# Patient Record
Sex: FEMALE | Race: WHITE | NOT HISPANIC OR LATINO | Employment: OTHER | URBAN - METROPOLITAN AREA
[De-identification: names, ages, dates, MRNs, and addresses within clinical notes are randomized per-mention and may not be internally consistent; named-entity substitution may affect disease eponyms.]

---

## 2021-01-01 ENCOUNTER — HOSPITAL ENCOUNTER (INPATIENT)
Facility: HOSPITAL | Age: 86
LOS: 1 days | Discharge: HOSPICE | End: 2021-12-06
Attending: STUDENT IN AN ORGANIZED HEALTH CARE EDUCATION/TRAINING PROGRAM | Admitting: INTERNAL MEDICINE

## 2021-01-01 ENCOUNTER — APPOINTMENT (OUTPATIENT)
Dept: CT IMAGING | Facility: HOSPITAL | Age: 86
End: 2021-01-01

## 2021-01-01 ENCOUNTER — HOSPITAL ENCOUNTER (INPATIENT)
Facility: HOSPITAL | Age: 86
LOS: 1 days | End: 2021-12-07
Attending: INTERNAL MEDICINE | Admitting: INTERNAL MEDICINE

## 2021-01-01 ENCOUNTER — APPOINTMENT (OUTPATIENT)
Dept: GENERAL RADIOLOGY | Facility: HOSPITAL | Age: 86
End: 2021-01-01

## 2021-01-01 ENCOUNTER — APPOINTMENT (OUTPATIENT)
Dept: CARDIOLOGY | Facility: HOSPITAL | Age: 86
End: 2021-01-01

## 2021-01-01 ENCOUNTER — HOSPITAL ENCOUNTER (INPATIENT)
Facility: HOSPITAL | Age: 86
LOS: 7 days | Discharge: NURSING, REHAB OR LONG-TERM CARE | End: 2021-10-22
Attending: EMERGENCY MEDICINE | Admitting: FAMILY MEDICINE

## 2021-01-01 VITALS
OXYGEN SATURATION: 92 % | DIASTOLIC BLOOD PRESSURE: 61 MMHG | RESPIRATION RATE: 30 BRPM | HEART RATE: 100 BPM | TEMPERATURE: 100.1 F | HEIGHT: 63 IN | WEIGHT: 91 LBS | SYSTOLIC BLOOD PRESSURE: 90 MMHG | BODY MASS INDEX: 16.12 KG/M2

## 2021-01-01 VITALS
RESPIRATION RATE: 27 BRPM | SYSTOLIC BLOOD PRESSURE: 91 MMHG | HEIGHT: 63 IN | BODY MASS INDEX: 16.16 KG/M2 | TEMPERATURE: 98.4 F | HEART RATE: 101 BPM | WEIGHT: 91.2 LBS | DIASTOLIC BLOOD PRESSURE: 55 MMHG | OXYGEN SATURATION: 90 %

## 2021-01-01 VITALS
SYSTOLIC BLOOD PRESSURE: 108 MMHG | TEMPERATURE: 99.6 F | DIASTOLIC BLOOD PRESSURE: 78 MMHG | OXYGEN SATURATION: 97 % | RESPIRATION RATE: 14 BRPM | HEART RATE: 107 BPM | WEIGHT: 99.9 LBS

## 2021-01-01 DIAGNOSIS — F03.91 DEMENTIA WITH BEHAVIORAL DISTURBANCE, UNSPECIFIED DEMENTIA TYPE: ICD-10-CM

## 2021-01-01 DIAGNOSIS — I48.91 ATRIAL FIBRILLATION, UNSPECIFIED TYPE (HCC): ICD-10-CM

## 2021-01-01 DIAGNOSIS — J96.01 ACUTE RESPIRATORY FAILURE WITH HYPOXIA (HCC): Primary | ICD-10-CM

## 2021-01-01 DIAGNOSIS — E43 SEVERE MALNUTRITION (HCC): ICD-10-CM

## 2021-01-01 DIAGNOSIS — N30.00 ACUTE CYSTITIS WITHOUT HEMATURIA: Primary | ICD-10-CM

## 2021-01-01 DIAGNOSIS — J18.9 PNEUMONIA OF BOTH LUNGS DUE TO INFECTIOUS ORGANISM, UNSPECIFIED PART OF LUNG: ICD-10-CM

## 2021-01-01 DIAGNOSIS — N32.89 BLADDER MASS: ICD-10-CM

## 2021-01-01 DIAGNOSIS — F03.90 DEMENTIA WITHOUT BEHAVIORAL DISTURBANCE, UNSPECIFIED DEMENTIA TYPE: ICD-10-CM

## 2021-01-01 DIAGNOSIS — R41.0 CONFUSION: ICD-10-CM

## 2021-01-01 LAB
ADV 40+41 DNA STL QL NAA+NON-PROBE: NOT DETECTED
ALBUMIN SERPL-MCNC: 3.7 G/DL (ref 3.5–5.2)
ALBUMIN SERPL-MCNC: 3.7 G/DL (ref 3.5–5.2)
ALBUMIN/GLOB SERPL: 1.1 G/DL
ALBUMIN/GLOB SERPL: 1.3 G/DL
ALP SERPL-CCNC: 205 U/L (ref 39–117)
ALP SERPL-CCNC: 258 U/L (ref 39–117)
ALT SERPL W P-5'-P-CCNC: 21 U/L (ref 1–33)
ALT SERPL W P-5'-P-CCNC: 34 U/L (ref 1–33)
ANION GAP SERPL CALCULATED.3IONS-SCNC: 10 MMOL/L (ref 5–15)
ANION GAP SERPL CALCULATED.3IONS-SCNC: 11 MMOL/L (ref 5–15)
ANION GAP SERPL CALCULATED.3IONS-SCNC: 11 MMOL/L (ref 5–15)
ANION GAP SERPL CALCULATED.3IONS-SCNC: 12 MMOL/L (ref 5–15)
ANION GAP SERPL CALCULATED.3IONS-SCNC: 9 MMOL/L (ref 5–15)
ARTERIAL PATENCY WRIST A: ABNORMAL
AST SERPL-CCNC: 30 U/L (ref 1–32)
AST SERPL-CCNC: 32 U/L (ref 1–32)
ASTRO TYP 1-8 RNA STL QL NAA+NON-PROBE: NOT DETECTED
ATMOSPHERIC PRESS: ABNORMAL MM[HG]
BACTERIA SPEC AEROBE CULT: ABNORMAL
BACTERIA UR QL AUTO: ABNORMAL /HPF
BACTERIA UR QL AUTO: ABNORMAL /HPF
BASE EXCESS BLDA CALC-SCNC: -2.7 MMOL/L (ref 0–2)
BASOPHILS # BLD AUTO: 0.06 10*3/MM3 (ref 0–0.2)
BASOPHILS NFR BLD AUTO: 0.5 % (ref 0–1.5)
BASOPHILS NFR BLD AUTO: 0.8 % (ref 0–1.5)
BASOPHILS NFR BLD AUTO: 0.9 % (ref 0–1.5)
BDY SITE: ABNORMAL
BILIRUB SERPL-MCNC: 0.5 MG/DL (ref 0–1.2)
BILIRUB SERPL-MCNC: 0.5 MG/DL (ref 0–1.2)
BILIRUB UR QL STRIP: NEGATIVE
BILIRUB UR QL STRIP: NEGATIVE
BODY TEMPERATURE: 37 C
BUN SERPL-MCNC: 11 MG/DL (ref 8–23)
BUN SERPL-MCNC: 11 MG/DL (ref 8–23)
BUN SERPL-MCNC: 12 MG/DL (ref 8–23)
BUN SERPL-MCNC: 14 MG/DL (ref 8–23)
BUN SERPL-MCNC: 20 MG/DL (ref 8–23)
BUN SERPL-MCNC: 20 MG/DL (ref 8–23)
BUN SERPL-MCNC: 22 MG/DL (ref 8–23)
BUN/CREAT SERPL: 15.8 (ref 7–25)
BUN/CREAT SERPL: 16.7 (ref 7–25)
BUN/CREAT SERPL: 16.7 (ref 7–25)
BUN/CREAT SERPL: 23 (ref 7–25)
BUN/CREAT SERPL: 23.7 (ref 7–25)
BUN/CREAT SERPL: 25.3 (ref 7–25)
BUN/CREAT SERPL: 29 (ref 7–25)
C CAYETANENSIS DNA STL QL NAA+NON-PROBE: NOT DETECTED
C COLI+JEJ+UPSA DNA STL QL NAA+NON-PROBE: NOT DETECTED
C DIFF TOX GENS STL QL NAA+PROBE: NOT DETECTED
CALCIUM SPEC-SCNC: 10.2 MG/DL (ref 8.2–9.6)
CALCIUM SPEC-SCNC: 8.4 MG/DL (ref 8.2–9.6)
CALCIUM SPEC-SCNC: 8.5 MG/DL (ref 8.2–9.6)
CALCIUM SPEC-SCNC: 8.7 MG/DL (ref 8.2–9.6)
CALCIUM SPEC-SCNC: 9.3 MG/DL (ref 8.2–9.6)
CALCIUM SPEC-SCNC: 9.4 MG/DL (ref 8.2–9.6)
CALCIUM SPEC-SCNC: 9.4 MG/DL (ref 8.2–9.6)
CHLORIDE SERPL-SCNC: 102 MMOL/L (ref 98–107)
CHLORIDE SERPL-SCNC: 103 MMOL/L (ref 98–107)
CHLORIDE SERPL-SCNC: 104 MMOL/L (ref 98–107)
CHLORIDE SERPL-SCNC: 105 MMOL/L (ref 98–107)
CHLORIDE SERPL-SCNC: 105 MMOL/L (ref 98–107)
CHLORIDE SERPL-SCNC: 106 MMOL/L (ref 98–107)
CHLORIDE SERPL-SCNC: 107 MMOL/L (ref 98–107)
CLARITY UR: CLEAR
CLARITY UR: CLEAR
CO2 BLDA-SCNC: 25.3 MMOL/L (ref 22–33)
CO2 SERPL-SCNC: 21 MMOL/L (ref 22–29)
CO2 SERPL-SCNC: 23 MMOL/L (ref 22–29)
CO2 SERPL-SCNC: 24 MMOL/L (ref 22–29)
CO2 SERPL-SCNC: 24 MMOL/L (ref 22–29)
CO2 SERPL-SCNC: 26 MMOL/L (ref 22–29)
CO2 SERPL-SCNC: 28 MMOL/L (ref 22–29)
CO2 SERPL-SCNC: 29 MMOL/L (ref 22–29)
COHGB MFR BLD: 0.5 % (ref 0–2)
COLOR UR: YELLOW
COLOR UR: YELLOW
CREAT SERPL-MCNC: 0.61 MG/DL (ref 0.57–1)
CREAT SERPL-MCNC: 0.66 MG/DL (ref 0.57–1)
CREAT SERPL-MCNC: 0.66 MG/DL (ref 0.57–1)
CREAT SERPL-MCNC: 0.69 MG/DL (ref 0.57–1)
CREAT SERPL-MCNC: 0.76 MG/DL (ref 0.57–1)
CREAT SERPL-MCNC: 0.79 MG/DL (ref 0.57–1)
CREAT SERPL-MCNC: 0.93 MG/DL (ref 0.57–1)
CRYPTOSP DNA STL QL NAA+NON-PROBE: NOT DETECTED
D DIMER PPP FEU-MCNC: >20 MCGFEU/ML (ref 0–0.56)
D-LACTATE SERPL-SCNC: 2.2 MMOL/L (ref 0.5–2)
DEPRECATED RDW RBC AUTO: 45.4 FL (ref 37–54)
DEPRECATED RDW RBC AUTO: 45.6 FL (ref 37–54)
DEPRECATED RDW RBC AUTO: 51.8 FL (ref 37–54)
E HISTOLYT DNA STL QL NAA+NON-PROBE: NOT DETECTED
EAEC PAA PLAS AGGR+AATA ST NAA+NON-PRB: NOT DETECTED
EC STX1+STX2 GENES STL QL NAA+NON-PROBE: NOT DETECTED
EOSINOPHIL # BLD AUTO: 0.13 10*3/MM3 (ref 0–0.4)
EOSINOPHIL # BLD AUTO: 0.34 10*3/MM3 (ref 0–0.4)
EOSINOPHIL # BLD AUTO: 0.39 10*3/MM3 (ref 0–0.4)
EOSINOPHIL NFR BLD AUTO: 1 % (ref 0.3–6.2)
EOSINOPHIL NFR BLD AUTO: 5.2 % (ref 0.3–6.2)
EOSINOPHIL NFR BLD AUTO: 5.3 % (ref 0.3–6.2)
EPAP: 0
EPEC EAE GENE STL QL NAA+NON-PROBE: NOT DETECTED
ERYTHROCYTE [DISTWIDTH] IN BLOOD BY AUTOMATED COUNT: 14 % (ref 12.3–15.4)
ERYTHROCYTE [DISTWIDTH] IN BLOOD BY AUTOMATED COUNT: 14 % (ref 12.3–15.4)
ERYTHROCYTE [DISTWIDTH] IN BLOOD BY AUTOMATED COUNT: 14.7 % (ref 12.3–15.4)
ETEC LTA+ST1A+ST1B TOX ST NAA+NON-PROBE: NOT DETECTED
FLUAV RNA RESP QL NAA+PROBE: NOT DETECTED
FLUBV RNA RESP QL NAA+PROBE: NOT DETECTED
G LAMBLIA DNA STL QL NAA+NON-PROBE: NOT DETECTED
GFR SERPL CREATININE-BSD FRML MDRD: 57 ML/MIN/1.73
GFR SERPL CREATININE-BSD FRML MDRD: 68 ML/MIN/1.73
GFR SERPL CREATININE-BSD FRML MDRD: 68 ML/MIN/1.73
GFR SERPL CREATININE-BSD FRML MDRD: 71 ML/MIN/1.73
GFR SERPL CREATININE-BSD FRML MDRD: 80 ML/MIN/1.73
GFR SERPL CREATININE-BSD FRML MDRD: 84 ML/MIN/1.73
GFR SERPL CREATININE-BSD FRML MDRD: 84 ML/MIN/1.73
GFR SERPL CREATININE-BSD FRML MDRD: 92 ML/MIN/1.73
GLOBULIN UR ELPH-MCNC: 2.9 GM/DL
GLOBULIN UR ELPH-MCNC: 3.5 GM/DL
GLUCOSE SERPL-MCNC: 117 MG/DL (ref 65–99)
GLUCOSE SERPL-MCNC: 118 MG/DL (ref 65–99)
GLUCOSE SERPL-MCNC: 135 MG/DL (ref 65–99)
GLUCOSE SERPL-MCNC: 84 MG/DL (ref 65–99)
GLUCOSE SERPL-MCNC: 86 MG/DL (ref 65–99)
GLUCOSE SERPL-MCNC: 90 MG/DL (ref 65–99)
GLUCOSE SERPL-MCNC: 90 MG/DL (ref 65–99)
GLUCOSE UR STRIP-MCNC: NEGATIVE MG/DL
GLUCOSE UR STRIP-MCNC: NEGATIVE MG/DL
HCO3 BLDA-SCNC: 23.8 MMOL/L (ref 20–26)
HCT VFR BLD AUTO: 44.2 % (ref 34–46.6)
HCT VFR BLD AUTO: 45.3 % (ref 34–46.6)
HCT VFR BLD AUTO: 47.2 % (ref 34–46.6)
HCT VFR BLD CALC: 44.7 % (ref 38–51)
HGB BLD-MCNC: 15 G/DL (ref 12–15.9)
HGB BLD-MCNC: 15.2 G/DL (ref 12–15.9)
HGB BLD-MCNC: 15.6 G/DL (ref 12–15.9)
HGB BLDA-MCNC: 14.6 G/DL (ref 14–18)
HGB UR QL STRIP.AUTO: ABNORMAL
HGB UR QL STRIP.AUTO: NEGATIVE
HOLD SPECIMEN: NORMAL
HYALINE CASTS UR QL AUTO: ABNORMAL /LPF
IMM GRANULOCYTES # BLD AUTO: 0.02 10*3/MM3 (ref 0–0.05)
IMM GRANULOCYTES # BLD AUTO: 0.02 10*3/MM3 (ref 0–0.05)
IMM GRANULOCYTES # BLD AUTO: 0.26 10*3/MM3 (ref 0–0.05)
IMM GRANULOCYTES NFR BLD AUTO: 0.3 % (ref 0–0.5)
IMM GRANULOCYTES NFR BLD AUTO: 0.3 % (ref 0–0.5)
IMM GRANULOCYTES NFR BLD AUTO: 2 % (ref 0–0.5)
INHALED O2 CONCENTRATION: 100 %
IPAP: 0
KETONES UR QL STRIP: ABNORMAL
KETONES UR QL STRIP: NEGATIVE
LEUKOCYTE ESTERASE UR QL STRIP.AUTO: ABNORMAL
LEUKOCYTE ESTERASE UR QL STRIP.AUTO: ABNORMAL
LIPASE SERPL-CCNC: 69 U/L (ref 13–60)
LYMPHOCYTES # BLD AUTO: 1.55 10*3/MM3 (ref 0.7–3.1)
LYMPHOCYTES # BLD AUTO: 1.83 10*3/MM3 (ref 0.7–3.1)
LYMPHOCYTES # BLD AUTO: 2.29 10*3/MM3 (ref 0.7–3.1)
LYMPHOCYTES NFR BLD AUTO: 17.3 % (ref 19.6–45.3)
LYMPHOCYTES NFR BLD AUTO: 23.6 % (ref 19.6–45.3)
LYMPHOCYTES NFR BLD AUTO: 24.8 % (ref 19.6–45.3)
MAGNESIUM SERPL-MCNC: 1.8 MG/DL (ref 1.7–2.3)
MAGNESIUM SERPL-MCNC: 1.9 MG/DL (ref 1.7–2.3)
MAGNESIUM SERPL-MCNC: 2.1 MG/DL (ref 1.7–2.3)
MAGNESIUM SERPL-MCNC: 2.2 MG/DL (ref 1.7–2.3)
MAGNESIUM SERPL-MCNC: 2.3 MG/DL (ref 1.7–2.3)
MAGNESIUM SERPL-MCNC: 2.5 MG/DL (ref 1.7–2.3)
MCH RBC QN AUTO: 30.4 PG (ref 26.6–33)
MCH RBC QN AUTO: 30.6 PG (ref 26.6–33)
MCH RBC QN AUTO: 30.8 PG (ref 26.6–33)
MCHC RBC AUTO-ENTMCNC: 31.8 G/DL (ref 31.5–35.7)
MCHC RBC AUTO-ENTMCNC: 34.4 G/DL (ref 31.5–35.7)
MCHC RBC AUTO-ENTMCNC: 34.4 G/DL (ref 31.5–35.7)
MCV RBC AUTO: 88.9 FL (ref 79–97)
MCV RBC AUTO: 89.5 FL (ref 79–97)
MCV RBC AUTO: 95.5 FL (ref 79–97)
METHGB BLD QL: 0.6 % (ref 0–1.5)
MODALITY: ABNORMAL
MONOCYTES # BLD AUTO: 0.72 10*3/MM3 (ref 0.1–0.9)
MONOCYTES # BLD AUTO: 0.83 10*3/MM3 (ref 0.1–0.9)
MONOCYTES # BLD AUTO: 0.99 10*3/MM3 (ref 0.1–0.9)
MONOCYTES NFR BLD AUTO: 11 % (ref 5–12)
MONOCYTES NFR BLD AUTO: 11.2 % (ref 5–12)
MONOCYTES NFR BLD AUTO: 7.5 % (ref 5–12)
NEUTROPHILS NFR BLD AUTO: 3.88 10*3/MM3 (ref 1.7–7)
NEUTROPHILS NFR BLD AUTO: 4.26 10*3/MM3 (ref 1.7–7)
NEUTROPHILS NFR BLD AUTO: 57.6 % (ref 42.7–76)
NEUTROPHILS NFR BLD AUTO: 59 % (ref 42.7–76)
NEUTROPHILS NFR BLD AUTO: 71.7 % (ref 42.7–76)
NEUTROPHILS NFR BLD AUTO: 9.48 10*3/MM3 (ref 1.7–7)
NITRITE UR QL STRIP: NEGATIVE
NITRITE UR QL STRIP: POSITIVE
NOROVIRUS GI+II RNA STL QL NAA+NON-PROBE: NOT DETECTED
NOTE: ABNORMAL
NRBC BLD AUTO-RTO: 0 /100 WBC (ref 0–0.2)
NT-PROBNP SERPL-MCNC: 853.2 PG/ML (ref 0–1800)
NT-PROBNP SERPL-MCNC: 989.9 PG/ML (ref 0–1800)
OXYHGB MFR BLDV: 98.7 % (ref 94–99)
P SHIGELLOIDES DNA STL QL NAA+NON-PROBE: NOT DETECTED
PAW @ PEAK INSP FLOW SETTING VENT: 0 CMH2O
PCO2 BLDA: 46.9 MM HG (ref 35–45)
PCO2 TEMP ADJ BLD: 46.9 MM HG (ref 35–45)
PH BLDA: 7.31 PH UNITS (ref 7.35–7.45)
PH UR STRIP.AUTO: 5.5 [PH] (ref 5–8)
PH UR STRIP.AUTO: 6 [PH] (ref 5–8)
PH, TEMP CORRECTED: 7.31 PH UNITS
PHOSPHATE SERPL-MCNC: 3.4 MG/DL (ref 2.5–4.5)
PLATELET # BLD AUTO: 276 10*3/MM3 (ref 140–450)
PLATELET # BLD AUTO: 296 10*3/MM3 (ref 140–450)
PLATELET # BLD AUTO: 305 10*3/MM3 (ref 140–450)
PMV BLD AUTO: 10 FL (ref 6–12)
PMV BLD AUTO: 10.1 FL (ref 6–12)
PMV BLD AUTO: 10.8 FL (ref 6–12)
PO2 BLDA: 298 MM HG (ref 83–108)
PO2 TEMP ADJ BLD: 298 MM HG (ref 83–108)
POTASSIUM SERPL-SCNC: 3.2 MMOL/L (ref 3.5–5.2)
POTASSIUM SERPL-SCNC: 3.4 MMOL/L (ref 3.5–5.2)
POTASSIUM SERPL-SCNC: 3.6 MMOL/L (ref 3.5–5.2)
POTASSIUM SERPL-SCNC: 3.8 MMOL/L (ref 3.5–5.2)
POTASSIUM SERPL-SCNC: 3.8 MMOL/L (ref 3.5–5.2)
POTASSIUM SERPL-SCNC: 3.9 MMOL/L (ref 3.5–5.2)
POTASSIUM SERPL-SCNC: 4.2 MMOL/L (ref 3.5–5.2)
POTASSIUM SERPL-SCNC: 4.4 MMOL/L (ref 3.5–5.2)
PROCALCITONIN SERPL-MCNC: 0.07 NG/ML (ref 0–0.25)
PROT SERPL-MCNC: 6.6 G/DL (ref 6–8.5)
PROT SERPL-MCNC: 7.2 G/DL (ref 6–8.5)
PROT UR QL STRIP: ABNORMAL
PROT UR QL STRIP: ABNORMAL
QT INTERVAL: 276 MS
QT INTERVAL: 360 MS
QT INTERVAL: 376 MS
QT INTERVAL: 392 MS
QT INTERVAL: 394 MS
QTC INTERVAL: 457 MS
QTC INTERVAL: 483 MS
QTC INTERVAL: 511 MS
QTC INTERVAL: 516 MS
QTC INTERVAL: 533 MS
RBC # BLD AUTO: 4.94 10*6/MM3 (ref 3.77–5.28)
RBC # BLD AUTO: 4.97 10*6/MM3 (ref 3.77–5.28)
RBC # BLD AUTO: 5.06 10*6/MM3 (ref 3.77–5.28)
RBC # UR STRIP: ABNORMAL /HPF
RBC # UR: ABNORMAL /HPF
REF LAB TEST METHOD: ABNORMAL
REF LAB TEST METHOD: ABNORMAL
RVA RNA STL QL NAA+NON-PROBE: NOT DETECTED
S ENT+BONG DNA STL QL NAA+NON-PROBE: NOT DETECTED
SAPO I+II+IV+V RNA STL QL NAA+NON-PROBE: NOT DETECTED
SARS-COV-2 RDRP RESP QL NAA+PROBE: NORMAL
SARS-COV-2 RDRP RESP QL NAA+PROBE: NORMAL
SARS-COV-2 RNA PNL SPEC NAA+PROBE: NOT DETECTED
SARS-COV-2 RNA RESP QL NAA+PROBE: NOT DETECTED
SHIGELLA SP+EIEC IPAH ST NAA+NON-PROBE: NOT DETECTED
SODIUM SERPL-SCNC: 138 MMOL/L (ref 136–145)
SODIUM SERPL-SCNC: 139 MMOL/L (ref 136–145)
SODIUM SERPL-SCNC: 139 MMOL/L (ref 136–145)
SODIUM SERPL-SCNC: 140 MMOL/L (ref 136–145)
SODIUM SERPL-SCNC: 140 MMOL/L (ref 136–145)
SODIUM SERPL-SCNC: 141 MMOL/L (ref 136–145)
SODIUM SERPL-SCNC: 143 MMOL/L (ref 136–145)
SP GR UR STRIP: 1.02 (ref 1–1.03)
SP GR UR STRIP: 1.02 (ref 1–1.03)
SQUAMOUS #/AREA URNS HPF: ABNORMAL /HPF
SQUAMOUS #/AREA URNS HPF: ABNORMAL /HPF
T4 FREE SERPL-MCNC: 1.32 NG/DL (ref 0.93–1.7)
TOTAL RATE: 0 BREATHS/MINUTE
TROPONIN T SERPL-MCNC: <0.01 NG/ML (ref 0–0.03)
TSH SERPL DL<=0.05 MIU/L-ACNC: 14.36 UIU/ML (ref 0.27–4.2)
TSH SERPL DL<=0.05 MIU/L-ACNC: 7.37 UIU/ML (ref 0.27–4.2)
UROBILINOGEN UR QL STRIP: ABNORMAL
UROBILINOGEN UR QL STRIP: ABNORMAL
V CHOL+PARA+VUL DNA STL QL NAA+NON-PROBE: NOT DETECTED
V CHOLERAE DNA STL QL NAA+NON-PROBE: NOT DETECTED
WBC # BLD AUTO: 6.57 10*3/MM3 (ref 3.4–10.8)
WBC # BLD AUTO: 7.39 10*3/MM3 (ref 3.4–10.8)
WBC # UR STRIP: ABNORMAL /HPF
WBC NRBC COR # BLD: 13.21 10*3/MM3 (ref 3.4–10.8)
WBC UR QL AUTO: ABNORMAL /HPF
WHOLE BLOOD HOLD SPECIMEN: NORMAL
Y ENTEROCOL DNA STL QL NAA+NON-PROBE: NOT DETECTED

## 2021-01-01 PROCEDURE — 81001 URINALYSIS AUTO W/SCOPE: CPT | Performed by: EMERGENCY MEDICINE

## 2021-01-01 PROCEDURE — 85025 COMPLETE CBC W/AUTO DIFF WBC: CPT | Performed by: NURSE PRACTITIONER

## 2021-01-01 PROCEDURE — 80048 BASIC METABOLIC PNL TOTAL CA: CPT | Performed by: NURSE PRACTITIONER

## 2021-01-01 PROCEDURE — 83050 HGB METHEMOGLOBIN QUAN: CPT

## 2021-01-01 PROCEDURE — 99232 SBSQ HOSP IP/OBS MODERATE 35: CPT | Performed by: INTERNAL MEDICINE

## 2021-01-01 PROCEDURE — 94799 UNLISTED PULMONARY SVC/PX: CPT

## 2021-01-01 PROCEDURE — 99239 HOSP IP/OBS DSCHRG MGMT >30: CPT | Performed by: FAMILY MEDICINE

## 2021-01-01 PROCEDURE — 99223 1ST HOSP IP/OBS HIGH 75: CPT | Performed by: FAMILY MEDICINE

## 2021-01-01 PROCEDURE — 25010000002 HYDROMORPHONE PER 4 MG: Performed by: INTERNAL MEDICINE

## 2021-01-01 PROCEDURE — P9612 CATHETERIZE FOR URINE SPEC: HCPCS

## 2021-01-01 PROCEDURE — 85025 COMPLETE CBC W/AUTO DIFF WBC: CPT | Performed by: STUDENT IN AN ORGANIZED HEALTH CARE EDUCATION/TRAINING PROGRAM

## 2021-01-01 PROCEDURE — 25010000002 HEPARIN (PORCINE) PER 1000 UNITS: Performed by: FAMILY MEDICINE

## 2021-01-01 PROCEDURE — 70450 CT HEAD/BRAIN W/O DYE: CPT

## 2021-01-01 PROCEDURE — 85379 FIBRIN DEGRADATION QUANT: CPT | Performed by: STUDENT IN AN ORGANIZED HEALTH CARE EDUCATION/TRAINING PROGRAM

## 2021-01-01 PROCEDURE — 80048 BASIC METABOLIC PNL TOTAL CA: CPT | Performed by: HOSPITALIST

## 2021-01-01 PROCEDURE — 87636 SARSCOV2 & INF A&B AMP PRB: CPT | Performed by: NURSE PRACTITIONER

## 2021-01-01 PROCEDURE — 25010000002 CEFTRIAXONE PER 250 MG: Performed by: NURSE PRACTITIONER

## 2021-01-01 PROCEDURE — 94640 AIRWAY INHALATION TREATMENT: CPT

## 2021-01-01 PROCEDURE — 25010000002 HALOPERIDOL LACTATE PER 5 MG: Performed by: NURSE PRACTITIONER

## 2021-01-01 PROCEDURE — 84132 ASSAY OF SERUM POTASSIUM: CPT | Performed by: HOSPITALIST

## 2021-01-01 PROCEDURE — 25010000003 POTASSIUM CHLORIDE 10 MEQ/100ML SOLUTION: Performed by: NURSE PRACTITIONER

## 2021-01-01 PROCEDURE — 84100 ASSAY OF PHOSPHORUS: CPT | Performed by: STUDENT IN AN ORGANIZED HEALTH CARE EDUCATION/TRAINING PROGRAM

## 2021-01-01 PROCEDURE — 87040 BLOOD CULTURE FOR BACTERIA: CPT | Performed by: STUDENT IN AN ORGANIZED HEALTH CARE EDUCATION/TRAINING PROGRAM

## 2021-01-01 PROCEDURE — U0004 COV-19 TEST NON-CDC HGH THRU: HCPCS | Performed by: HOSPITALIST

## 2021-01-01 PROCEDURE — 87077 CULTURE AEROBIC IDENTIFY: CPT | Performed by: EMERGENCY MEDICINE

## 2021-01-01 PROCEDURE — 25010000002 HEPARIN (PORCINE) PER 1000 UNITS: Performed by: NURSE PRACTITIONER

## 2021-01-01 PROCEDURE — 25010000002 HYDROMORPHONE PER 4 MG: Performed by: NURSE PRACTITIONER

## 2021-01-01 PROCEDURE — 83735 ASSAY OF MAGNESIUM: CPT | Performed by: NURSE PRACTITIONER

## 2021-01-01 PROCEDURE — 71250 CT THORAX DX C-: CPT

## 2021-01-01 PROCEDURE — 93005 ELECTROCARDIOGRAM TRACING: CPT | Performed by: NURSE PRACTITIONER

## 2021-01-01 PROCEDURE — 80048 BASIC METABOLIC PNL TOTAL CA: CPT | Performed by: INTERNAL MEDICINE

## 2021-01-01 PROCEDURE — 87635 SARS-COV-2 COVID-19 AMP PRB: CPT | Performed by: STUDENT IN AN ORGANIZED HEALTH CARE EDUCATION/TRAINING PROGRAM

## 2021-01-01 PROCEDURE — 93010 ELECTROCARDIOGRAM REPORT: CPT | Performed by: INTERNAL MEDICINE

## 2021-01-01 PROCEDURE — 25010000002 HALOPERIDOL LACTATE PER 5 MG: Performed by: INTERNAL MEDICINE

## 2021-01-01 PROCEDURE — 93005 ELECTROCARDIOGRAM TRACING: CPT | Performed by: STUDENT IN AN ORGANIZED HEALTH CARE EDUCATION/TRAINING PROGRAM

## 2021-01-01 PROCEDURE — 94660 CPAP INITIATION&MGMT: CPT

## 2021-01-01 PROCEDURE — 25010000002 ADENOSINE PER 6 MG: Performed by: HOSPITALIST

## 2021-01-01 PROCEDURE — 25010000002 HYDROMORPHONE PER 4 MG: Performed by: STUDENT IN AN ORGANIZED HEALTH CARE EDUCATION/TRAINING PROGRAM

## 2021-01-01 PROCEDURE — 84132 ASSAY OF SERUM POTASSIUM: CPT | Performed by: INTERNAL MEDICINE

## 2021-01-01 PROCEDURE — 99232 SBSQ HOSP IP/OBS MODERATE 35: CPT | Performed by: HOSPITALIST

## 2021-01-01 PROCEDURE — 25010000002 KETOROLAC TROMETHAMINE PER 15 MG: Performed by: NURSE PRACTITIONER

## 2021-01-01 PROCEDURE — 83735 ASSAY OF MAGNESIUM: CPT | Performed by: PHYSICIAN ASSISTANT

## 2021-01-01 PROCEDURE — 25010000002 CEFEPIME PER 500 MG: Performed by: INTERNAL MEDICINE

## 2021-01-01 PROCEDURE — U0005 INFEC AGEN DETEC AMPLI PROBE: HCPCS | Performed by: HOSPITALIST

## 2021-01-01 PROCEDURE — 97535 SELF CARE MNGMENT TRAINING: CPT

## 2021-01-01 PROCEDURE — 25010000003 MAGNESIUM SULFATE 4 GM/100ML SOLUTION: Performed by: NURSE PRACTITIONER

## 2021-01-01 PROCEDURE — 84439 ASSAY OF FREE THYROXINE: CPT | Performed by: STUDENT IN AN ORGANIZED HEALTH CARE EDUCATION/TRAINING PROGRAM

## 2021-01-01 PROCEDURE — 93005 ELECTROCARDIOGRAM TRACING: CPT | Performed by: HOSPITALIST

## 2021-01-01 PROCEDURE — 99223 1ST HOSP IP/OBS HIGH 75: CPT | Performed by: INTERNAL MEDICINE

## 2021-01-01 PROCEDURE — 97161 PT EVAL LOW COMPLEX 20 MIN: CPT

## 2021-01-01 PROCEDURE — 25010000002 LORAZEPAM PER 2 MG: Performed by: NURSE PRACTITIONER

## 2021-01-01 PROCEDURE — 99233 SBSQ HOSP IP/OBS HIGH 50: CPT | Performed by: PHYSICIAN ASSISTANT

## 2021-01-01 PROCEDURE — 25010000002 LORAZEPAM PER 2 MG: Performed by: STUDENT IN AN ORGANIZED HEALTH CARE EDUCATION/TRAINING PROGRAM

## 2021-01-01 PROCEDURE — 84484 ASSAY OF TROPONIN QUANT: CPT | Performed by: NURSE PRACTITIONER

## 2021-01-01 PROCEDURE — 74176 CT ABD & PELVIS W/O CONTRAST: CPT

## 2021-01-01 PROCEDURE — 82375 ASSAY CARBOXYHB QUANT: CPT

## 2021-01-01 PROCEDURE — 83880 ASSAY OF NATRIURETIC PEPTIDE: CPT | Performed by: STUDENT IN AN ORGANIZED HEALTH CARE EDUCATION/TRAINING PROGRAM

## 2021-01-01 PROCEDURE — 99233 SBSQ HOSP IP/OBS HIGH 50: CPT | Performed by: INTERNAL MEDICINE

## 2021-01-01 PROCEDURE — 83605 ASSAY OF LACTIC ACID: CPT | Performed by: STUDENT IN AN ORGANIZED HEALTH CARE EDUCATION/TRAINING PROGRAM

## 2021-01-01 PROCEDURE — 36600 WITHDRAWAL OF ARTERIAL BLOOD: CPT

## 2021-01-01 PROCEDURE — 97166 OT EVAL MOD COMPLEX 45 MIN: CPT

## 2021-01-01 PROCEDURE — 25010000002 HYDROMORPHONE 1 MG/ML SOLUTION: Performed by: STUDENT IN AN ORGANIZED HEALTH CARE EDUCATION/TRAINING PROGRAM

## 2021-01-01 PROCEDURE — 87186 SC STD MICRODIL/AGAR DIL: CPT | Performed by: EMERGENCY MEDICINE

## 2021-01-01 PROCEDURE — 99221 1ST HOSP IP/OBS SF/LOW 40: CPT | Performed by: UROLOGY

## 2021-01-01 PROCEDURE — 97110 THERAPEUTIC EXERCISES: CPT

## 2021-01-01 PROCEDURE — 0097U HC BIOFIRE FILMARRAY GI PANEL: CPT | Performed by: NURSE PRACTITIONER

## 2021-01-01 PROCEDURE — 25010000002 LORAZEPAM PER 2 MG: Performed by: EMERGENCY MEDICINE

## 2021-01-01 PROCEDURE — 80053 COMPREHEN METABOLIC PANEL: CPT | Performed by: STUDENT IN AN ORGANIZED HEALTH CARE EDUCATION/TRAINING PROGRAM

## 2021-01-01 PROCEDURE — 87493 C DIFF AMPLIFIED PROBE: CPT | Performed by: NURSE PRACTITIONER

## 2021-01-01 PROCEDURE — 84145 PROCALCITONIN (PCT): CPT | Performed by: STUDENT IN AN ORGANIZED HEALTH CARE EDUCATION/TRAINING PROGRAM

## 2021-01-01 PROCEDURE — 84484 ASSAY OF TROPONIN QUANT: CPT | Performed by: STUDENT IN AN ORGANIZED HEALTH CARE EDUCATION/TRAINING PROGRAM

## 2021-01-01 PROCEDURE — 87635 SARS-COV-2 COVID-19 AMP PRB: CPT | Performed by: HOSPITALIST

## 2021-01-01 PROCEDURE — 81001 URINALYSIS AUTO W/SCOPE: CPT | Performed by: STUDENT IN AN ORGANIZED HEALTH CARE EDUCATION/TRAINING PROGRAM

## 2021-01-01 PROCEDURE — 83735 ASSAY OF MAGNESIUM: CPT | Performed by: HOSPITALIST

## 2021-01-01 PROCEDURE — 71045 X-RAY EXAM CHEST 1 VIEW: CPT

## 2021-01-01 PROCEDURE — 83880 ASSAY OF NATRIURETIC PEPTIDE: CPT | Performed by: NURSE PRACTITIONER

## 2021-01-01 PROCEDURE — 99285 EMERGENCY DEPT VISIT HI MDM: CPT

## 2021-01-01 PROCEDURE — 84132 ASSAY OF SERUM POTASSIUM: CPT | Performed by: NURSE PRACTITIONER

## 2021-01-01 PROCEDURE — 80048 BASIC METABOLIC PNL TOTAL CA: CPT | Performed by: PHYSICIAN ASSISTANT

## 2021-01-01 PROCEDURE — 83735 ASSAY OF MAGNESIUM: CPT | Performed by: STUDENT IN AN ORGANIZED HEALTH CARE EDUCATION/TRAINING PROGRAM

## 2021-01-01 PROCEDURE — 84443 ASSAY THYROID STIM HORMONE: CPT | Performed by: NURSE PRACTITIONER

## 2021-01-01 PROCEDURE — 82805 BLOOD GASES W/O2 SATURATION: CPT

## 2021-01-01 PROCEDURE — 25010000002 CEFEPIME PER 500 MG: Performed by: STUDENT IN AN ORGANIZED HEALTH CARE EDUCATION/TRAINING PROGRAM

## 2021-01-01 PROCEDURE — 87086 URINE CULTURE/COLONY COUNT: CPT | Performed by: EMERGENCY MEDICINE

## 2021-01-01 PROCEDURE — 80053 COMPREHEN METABOLIC PANEL: CPT | Performed by: NURSE PRACTITIONER

## 2021-01-01 PROCEDURE — 25010000002 VANCOMYCIN PER 500 MG: Performed by: STUDENT IN AN ORGANIZED HEALTH CARE EDUCATION/TRAINING PROGRAM

## 2021-01-01 PROCEDURE — 99239 HOSP IP/OBS DSCHRG MGMT >30: CPT | Performed by: INTERNAL MEDICINE

## 2021-01-01 PROCEDURE — 84443 ASSAY THYROID STIM HORMONE: CPT | Performed by: STUDENT IN AN ORGANIZED HEALTH CARE EDUCATION/TRAINING PROGRAM

## 2021-01-01 PROCEDURE — 99233 SBSQ HOSP IP/OBS HIGH 50: CPT | Performed by: HOSPITALIST

## 2021-01-01 PROCEDURE — 83690 ASSAY OF LIPASE: CPT | Performed by: NURSE PRACTITIONER

## 2021-01-01 PROCEDURE — 99284 EMERGENCY DEPT VISIT MOD MDM: CPT

## 2021-01-01 RX ORDER — UREA 10 %
27 LOTION (ML) TOPICAL DAILY
Start: 2021-01-01

## 2021-01-01 RX ORDER — SODIUM CHLORIDE 0.9 % (FLUSH) 0.9 %
10 SYRINGE (ML) INJECTION AS NEEDED
Status: DISCONTINUED | OUTPATIENT
Start: 2021-01-01 | End: 2021-01-01 | Stop reason: HOSPADM

## 2021-01-01 RX ORDER — HALOPERIDOL 5 MG/ML
1 INJECTION INTRAMUSCULAR EVERY 6 HOURS PRN
Status: DISCONTINUED | OUTPATIENT
Start: 2021-01-01 | End: 2021-01-01 | Stop reason: HOSPADM

## 2021-01-01 RX ORDER — METOPROLOL SUCCINATE 25 MG/1
12.5 TABLET, EXTENDED RELEASE ORAL DAILY
COMMUNITY
End: 2021-01-01 | Stop reason: HOSPADM

## 2021-01-01 RX ORDER — ACETAMINOPHEN 160 MG/5ML
650 SOLUTION ORAL EVERY 4 HOURS PRN
Status: DISCONTINUED | OUTPATIENT
Start: 2021-01-01 | End: 2021-01-01 | Stop reason: HOSPADM

## 2021-01-01 RX ORDER — IPRATROPIUM BROMIDE AND ALBUTEROL SULFATE 2.5; .5 MG/3ML; MG/3ML
3 SOLUTION RESPIRATORY (INHALATION)
Status: CANCELLED | OUTPATIENT
Start: 2021-01-01

## 2021-01-01 RX ORDER — LORAZEPAM 2 MG/ML
0.5 INJECTION INTRAMUSCULAR EVERY 8 HOURS
Status: DISCONTINUED | OUTPATIENT
Start: 2021-01-01 | End: 2021-01-01

## 2021-01-01 RX ORDER — ALBUTEROL SULFATE 2.5 MG/3ML
2.5 SOLUTION RESPIRATORY (INHALATION) EVERY 4 HOURS PRN
Status: CANCELLED | OUTPATIENT
Start: 2021-01-01

## 2021-01-01 RX ORDER — ONDANSETRON 2 MG/ML
4 INJECTION INTRAMUSCULAR; INTRAVENOUS EVERY 6 HOURS PRN
Status: DISCONTINUED | OUTPATIENT
Start: 2021-01-01 | End: 2021-01-01 | Stop reason: HOSPADM

## 2021-01-01 RX ORDER — MAGNESIUM SULFATE HEPTAHYDRATE 40 MG/ML
4 INJECTION, SOLUTION INTRAVENOUS AS NEEDED
Status: DISCONTINUED | OUTPATIENT
Start: 2021-01-01 | End: 2021-01-01 | Stop reason: HOSPADM

## 2021-01-01 RX ORDER — POTASSIUM CHLORIDE 750 MG/1
20 CAPSULE, EXTENDED RELEASE ORAL DAILY
Start: 2021-01-01

## 2021-01-01 RX ORDER — LORAZEPAM 2 MG/ML
0.5 INJECTION INTRAMUSCULAR ONCE
Status: COMPLETED | OUTPATIENT
Start: 2021-01-01 | End: 2021-01-01

## 2021-01-01 RX ORDER — LORAZEPAM 2 MG/ML
0.5 INJECTION INTRAMUSCULAR
Status: CANCELLED | OUTPATIENT
Start: 2021-01-01 | End: 2021-12-12

## 2021-01-01 RX ORDER — MAGNESIUM SULFATE HEPTAHYDRATE 40 MG/ML
2 INJECTION, SOLUTION INTRAVENOUS AS NEEDED
Status: DISCONTINUED | OUTPATIENT
Start: 2021-01-01 | End: 2021-01-01 | Stop reason: HOSPADM

## 2021-01-01 RX ORDER — LEVOTHYROXINE SODIUM 20 UG/ML
50 INJECTION, SOLUTION INTRAVENOUS
Status: DISCONTINUED | OUTPATIENT
Start: 2021-01-01 | End: 2021-01-01 | Stop reason: HOSPADM

## 2021-01-01 RX ORDER — ACETAMINOPHEN 650 MG/1
650 SUPPOSITORY RECTAL EVERY 4 HOURS PRN
Status: DISCONTINUED | OUTPATIENT
Start: 2021-01-01 | End: 2021-01-01 | Stop reason: HOSPADM

## 2021-01-01 RX ORDER — POLYVINYL ALCOHOL 14 MG/ML
2 SOLUTION/ DROPS OPHTHALMIC
Status: DISCONTINUED | OUTPATIENT
Start: 2021-01-01 | End: 2021-01-01 | Stop reason: HOSPADM

## 2021-01-01 RX ORDER — HYDROMORPHONE HYDROCHLORIDE 1 MG/ML
0.25 INJECTION, SOLUTION INTRAMUSCULAR; INTRAVENOUS; SUBCUTANEOUS ONCE
Status: COMPLETED | OUTPATIENT
Start: 2021-01-01 | End: 2021-01-01

## 2021-01-01 RX ORDER — CHOLECALCIFEROL (VITAMIN D3) 125 MCG
5 CAPSULE ORAL NIGHTLY PRN
Status: DISCONTINUED | OUTPATIENT
Start: 2021-01-01 | End: 2021-01-01

## 2021-01-01 RX ORDER — HEPARIN SODIUM 5000 [USP'U]/ML
5000 INJECTION, SOLUTION INTRAVENOUS; SUBCUTANEOUS EVERY 12 HOURS SCHEDULED
Status: DISCONTINUED | OUTPATIENT
Start: 2021-01-01 | End: 2021-01-01 | Stop reason: HOSPADM

## 2021-01-01 RX ORDER — LORAZEPAM 2 MG/ML
INJECTION INTRAMUSCULAR
Status: ACTIVE
Start: 2021-01-01 | End: 2021-01-01

## 2021-01-01 RX ORDER — SODIUM CHLORIDE 0.9 % (FLUSH) 0.9 %
10 SYRINGE (ML) INJECTION EVERY 12 HOURS SCHEDULED
Status: CANCELLED | OUTPATIENT
Start: 2021-01-01

## 2021-01-01 RX ORDER — HYDROMORPHONE HYDROCHLORIDE 1 MG/ML
0.5 INJECTION, SOLUTION INTRAMUSCULAR; INTRAVENOUS; SUBCUTANEOUS EVERY 4 HOURS
Status: DISCONTINUED | OUTPATIENT
Start: 2021-01-01 | End: 2021-01-01 | Stop reason: HOSPADM

## 2021-01-01 RX ORDER — GLYCOPYRROLATE 0.2 MG/ML
0.2 INJECTION INTRAMUSCULAR; INTRAVENOUS EVERY 6 HOURS PRN
Status: DISCONTINUED | OUTPATIENT
Start: 2021-01-01 | End: 2021-01-01 | Stop reason: HOSPADM

## 2021-01-01 RX ORDER — LORAZEPAM 2 MG/ML
0.5 INJECTION INTRAMUSCULAR EVERY 8 HOURS
Status: DISCONTINUED | OUTPATIENT
Start: 2021-01-01 | End: 2021-01-01 | Stop reason: HOSPADM

## 2021-01-01 RX ORDER — ACETAMINOPHEN 325 MG/1
650 TABLET ORAL EVERY 4 HOURS PRN
Status: DISCONTINUED | OUTPATIENT
Start: 2021-01-01 | End: 2021-01-01 | Stop reason: HOSPADM

## 2021-01-01 RX ORDER — BUMETANIDE 0.25 MG/ML
0.5 INJECTION INTRAMUSCULAR; INTRAVENOUS EVERY 8 HOURS
Status: DISCONTINUED | OUTPATIENT
Start: 2021-01-01 | End: 2021-01-01 | Stop reason: HOSPADM

## 2021-01-01 RX ORDER — HEPARIN SODIUM 5000 [USP'U]/ML
5000 INJECTION, SOLUTION INTRAVENOUS; SUBCUTANEOUS EVERY 12 HOURS SCHEDULED
Status: CANCELLED | OUTPATIENT
Start: 2021-01-01

## 2021-01-01 RX ORDER — CHOLECALCIFEROL (VITAMIN D3) 125 MCG
5 CAPSULE ORAL NIGHTLY PRN
Status: DISCONTINUED | OUTPATIENT
Start: 2021-01-01 | End: 2021-01-01 | Stop reason: HOSPADM

## 2021-01-01 RX ORDER — LEVOTHYROXINE SODIUM 20 UG/ML
50 INJECTION, SOLUTION INTRAVENOUS
Status: CANCELLED | OUTPATIENT
Start: 2021-01-01

## 2021-01-01 RX ORDER — IPRATROPIUM BROMIDE AND ALBUTEROL SULFATE 2.5; .5 MG/3ML; MG/3ML
3 SOLUTION RESPIRATORY (INHALATION)
Status: DISCONTINUED | OUTPATIENT
Start: 2021-01-01 | End: 2021-01-01 | Stop reason: HOSPADM

## 2021-01-01 RX ORDER — POLYVINYL ALCOHOL 14 MG/ML
2 SOLUTION/ DROPS OPHTHALMIC 2 TIMES DAILY
Status: DISCONTINUED | OUTPATIENT
Start: 2021-01-01 | End: 2021-01-01 | Stop reason: HOSPADM

## 2021-01-01 RX ORDER — ADENOSINE 3 MG/ML
6 INJECTION, SOLUTION INTRAVENOUS ONCE
Status: DISCONTINUED | OUTPATIENT
Start: 2021-01-01 | End: 2021-01-01

## 2021-01-01 RX ORDER — ONDANSETRON 2 MG/ML
4 INJECTION INTRAMUSCULAR; INTRAVENOUS EVERY 6 HOURS PRN
Status: CANCELLED | OUTPATIENT
Start: 2021-01-01

## 2021-01-01 RX ORDER — LORAZEPAM 2 MG/ML
0.5 INJECTION INTRAMUSCULAR
Status: DISCONTINUED | OUTPATIENT
Start: 2021-01-01 | End: 2021-01-01 | Stop reason: HOSPADM

## 2021-01-01 RX ORDER — ACETAMINOPHEN 325 MG/1
650 TABLET ORAL EVERY 4 HOURS PRN
Status: CANCELLED | OUTPATIENT
Start: 2021-01-01

## 2021-01-01 RX ORDER — POTASSIUM CHLORIDE 750 MG/1
40 CAPSULE, EXTENDED RELEASE ORAL AS NEEDED
Status: DISCONTINUED | OUTPATIENT
Start: 2021-01-01 | End: 2021-01-01 | Stop reason: HOSPADM

## 2021-01-01 RX ORDER — SODIUM CHLORIDE 0.9 % (FLUSH) 0.9 %
10 SYRINGE (ML) INJECTION EVERY 12 HOURS SCHEDULED
Status: DISCONTINUED | OUTPATIENT
Start: 2021-01-01 | End: 2021-01-01 | Stop reason: HOSPADM

## 2021-01-01 RX ORDER — MIRTAZAPINE 15 MG/1
15 TABLET, ORALLY DISINTEGRATING ORAL NIGHTLY
Status: DISCONTINUED | OUTPATIENT
Start: 2021-01-01 | End: 2021-01-01

## 2021-01-01 RX ORDER — HYDROMORPHONE HYDROCHLORIDE 1 MG/ML
0.5 INJECTION, SOLUTION INTRAMUSCULAR; INTRAVENOUS; SUBCUTANEOUS
Status: DISCONTINUED | OUTPATIENT
Start: 2021-01-01 | End: 2021-01-01 | Stop reason: HOSPADM

## 2021-01-01 RX ORDER — HYDROMORPHONE HYDROCHLORIDE 1 MG/ML
0.25 INJECTION, SOLUTION INTRAMUSCULAR; INTRAVENOUS; SUBCUTANEOUS
Status: CANCELLED | OUTPATIENT
Start: 2021-01-01 | End: 2021-12-12

## 2021-01-01 RX ORDER — ALBUTEROL SULFATE 2.5 MG/3ML
2.5 SOLUTION RESPIRATORY (INHALATION) EVERY 4 HOURS PRN
Status: DISCONTINUED | OUTPATIENT
Start: 2021-01-01 | End: 2021-01-01 | Stop reason: HOSPADM

## 2021-01-01 RX ORDER — SCOLOPAMINE TRANSDERMAL SYSTEM 1 MG/1
1 PATCH, EXTENDED RELEASE TRANSDERMAL
Status: DISCONTINUED | OUTPATIENT
Start: 2021-01-01 | End: 2021-01-01 | Stop reason: HOSPADM

## 2021-01-01 RX ORDER — SODIUM CHLORIDE 0.9 % (FLUSH) 0.9 %
10 SYRINGE (ML) INJECTION AS NEEDED
Status: CANCELLED | OUTPATIENT
Start: 2021-01-01

## 2021-01-01 RX ORDER — METOPROLOL TARTRATE 5 MG/5ML
INJECTION INTRAVENOUS
Status: DISPENSED
Start: 2021-01-01 | End: 2021-01-01

## 2021-01-01 RX ORDER — LORAZEPAM 2 MG/ML
0.25 INJECTION INTRAMUSCULAR ONCE
Status: COMPLETED | OUTPATIENT
Start: 2021-01-01 | End: 2021-01-01

## 2021-01-01 RX ORDER — HYDROMORPHONE HYDROCHLORIDE 1 MG/ML
0.25 INJECTION, SOLUTION INTRAMUSCULAR; INTRAVENOUS; SUBCUTANEOUS
Status: DISCONTINUED | OUTPATIENT
Start: 2021-01-01 | End: 2021-01-01 | Stop reason: HOSPADM

## 2021-01-01 RX ORDER — BISACODYL 10 MG
10 SUPPOSITORY, RECTAL RECTAL DAILY PRN
Status: DISCONTINUED | OUTPATIENT
Start: 2021-01-01 | End: 2021-01-01 | Stop reason: HOSPADM

## 2021-01-01 RX ORDER — POTASSIUM CHLORIDE 750 MG/1
40 CAPSULE, EXTENDED RELEASE ORAL AS NEEDED
Status: DISCONTINUED | OUTPATIENT
Start: 2021-01-01 | End: 2021-01-01 | Stop reason: SDUPTHER

## 2021-01-01 RX ORDER — POTASSIUM CHLORIDE 7.45 MG/ML
10 INJECTION INTRAVENOUS
Status: DISCONTINUED | OUTPATIENT
Start: 2021-01-01 | End: 2021-01-01 | Stop reason: HOSPADM

## 2021-01-01 RX ORDER — MIRTAZAPINE 15 MG/1
15 TABLET, ORALLY DISINTEGRATING ORAL NIGHTLY
Status: DISCONTINUED | OUTPATIENT
Start: 2021-01-01 | End: 2021-01-01 | Stop reason: HOSPADM

## 2021-01-01 RX ORDER — BUMETANIDE 0.25 MG/ML
0.5 INJECTION INTRAMUSCULAR; INTRAVENOUS EVERY 8 HOURS PRN
Status: DISCONTINUED | OUTPATIENT
Start: 2021-01-01 | End: 2021-01-01 | Stop reason: HOSPADM

## 2021-01-01 RX ORDER — DOPAMINE HYDROCHLORIDE 160 MG/100ML
5 INJECTION, SOLUTION INTRAVENOUS
Status: DISCONTINUED | OUTPATIENT
Start: 2021-01-01 | End: 2021-01-01

## 2021-01-01 RX ORDER — POTASSIUM CHLORIDE 1.5 G/1.77G
40 POWDER, FOR SOLUTION ORAL AS NEEDED
Status: DISCONTINUED | OUTPATIENT
Start: 2021-01-01 | End: 2021-01-01 | Stop reason: SDUPTHER

## 2021-01-01 RX ORDER — METOPROLOL TARTRATE 5 MG/5ML
5 INJECTION INTRAVENOUS ONCE
Status: COMPLETED | OUTPATIENT
Start: 2021-01-01 | End: 2021-01-01

## 2021-01-01 RX ORDER — UREA 10 %
27 LOTION (ML) TOPICAL DAILY
Status: DISCONTINUED | OUTPATIENT
Start: 2021-01-01 | End: 2021-01-01 | Stop reason: HOSPADM

## 2021-01-01 RX ORDER — POTASSIUM CHLORIDE 7.45 MG/ML
10 INJECTION INTRAVENOUS
Status: DISCONTINUED | OUTPATIENT
Start: 2021-01-01 | End: 2021-01-01 | Stop reason: SDUPTHER

## 2021-01-01 RX ORDER — LEVOTHYROXINE SODIUM 0.07 MG/1
75 TABLET ORAL
Status: DISCONTINUED | OUTPATIENT
Start: 2021-01-01 | End: 2021-01-01 | Stop reason: HOSPADM

## 2021-01-01 RX ORDER — BUMETANIDE 0.25 MG/ML
1 INJECTION INTRAMUSCULAR; INTRAVENOUS EVERY 6 HOURS PRN
Status: CANCELLED | OUTPATIENT
Start: 2021-01-01

## 2021-01-01 RX ORDER — POTASSIUM CHLORIDE 750 MG/1
20 CAPSULE, EXTENDED RELEASE ORAL DAILY
Status: DISCONTINUED | OUTPATIENT
Start: 2021-01-01 | End: 2021-01-01 | Stop reason: HOSPADM

## 2021-01-01 RX ORDER — LEVOTHYROXINE SODIUM 0.07 MG/1
75 TABLET ORAL DAILY
Status: DISCONTINUED | OUTPATIENT
Start: 2021-01-01 | End: 2021-01-01

## 2021-01-01 RX ORDER — MIRTAZAPINE 15 MG/1
15 TABLET, ORALLY DISINTEGRATING ORAL NIGHTLY
Start: 2021-01-01

## 2021-01-01 RX ORDER — GLYCOPYRROLATE 0.2 MG/ML
0.6 INJECTION INTRAMUSCULAR; INTRAVENOUS EVERY 6 HOURS
Status: CANCELLED | OUTPATIENT
Start: 2021-01-01

## 2021-01-01 RX ORDER — SODIUM CHLORIDE, SODIUM LACTATE, POTASSIUM CHLORIDE, CALCIUM CHLORIDE 600; 310; 30; 20 MG/100ML; MG/100ML; MG/100ML; MG/100ML
75 INJECTION, SOLUTION INTRAVENOUS CONTINUOUS
Status: ACTIVE | OUTPATIENT
Start: 2021-01-01 | End: 2021-01-01

## 2021-01-01 RX ORDER — SCOLOPAMINE TRANSDERMAL SYSTEM 1 MG/1
1 PATCH, EXTENDED RELEASE TRANSDERMAL
Status: CANCELLED | OUTPATIENT
Start: 2021-12-08

## 2021-01-01 RX ORDER — GLYCOPYRROLATE 0.2 MG/ML
0.6 INJECTION INTRAMUSCULAR; INTRAVENOUS EVERY 6 HOURS
Status: DISCONTINUED | OUTPATIENT
Start: 2021-01-01 | End: 2021-01-01 | Stop reason: HOSPADM

## 2021-01-01 RX ORDER — BUMETANIDE 0.25 MG/ML
1 INJECTION INTRAMUSCULAR; INTRAVENOUS EVERY 6 HOURS PRN
Status: DISCONTINUED | OUTPATIENT
Start: 2021-01-01 | End: 2021-01-01 | Stop reason: HOSPADM

## 2021-01-01 RX ORDER — KETOROLAC TROMETHAMINE 30 MG/ML
15 INJECTION, SOLUTION INTRAMUSCULAR; INTRAVENOUS EVERY 6 HOURS PRN
Status: DISCONTINUED | OUTPATIENT
Start: 2021-01-01 | End: 2021-01-01 | Stop reason: HOSPADM

## 2021-01-01 RX ORDER — LEVOTHYROXINE SODIUM 88 UG/1
88 CAPSULE ORAL DAILY
COMMUNITY

## 2021-01-01 RX ORDER — SODIUM CHLORIDE, SODIUM LACTATE, POTASSIUM CHLORIDE, CALCIUM CHLORIDE 600; 310; 30; 20 MG/100ML; MG/100ML; MG/100ML; MG/100ML
75 INJECTION, SOLUTION INTRAVENOUS CONTINUOUS
Status: DISCONTINUED | OUTPATIENT
Start: 2021-01-01 | End: 2021-01-01

## 2021-01-01 RX ORDER — ADENOSINE 3 MG/ML
6 INJECTION, SOLUTION INTRAVENOUS ONCE
Status: COMPLETED | OUTPATIENT
Start: 2021-01-01 | End: 2021-01-01

## 2021-01-01 RX ORDER — HALOPERIDOL 5 MG/ML
1 INJECTION INTRAMUSCULAR EVERY 6 HOURS PRN
Status: CANCELLED | OUTPATIENT
Start: 2021-01-01

## 2021-01-01 RX ORDER — NOREPINEPHRINE BIT/0.9 % NACL 8 MG/250ML
INFUSION BOTTLE (ML) INTRAVENOUS
Status: DISCONTINUED
Start: 2021-01-01 | End: 2021-01-01 | Stop reason: WASHOUT

## 2021-01-01 RX ORDER — METOPROLOL TARTRATE 37.5 MG/1
37.5 TABLET, FILM COATED ORAL EVERY 12 HOURS SCHEDULED
Start: 2021-01-01

## 2021-01-01 RX ORDER — LORAZEPAM 2 MG/ML
0.5 INJECTION INTRAMUSCULAR EVERY 4 HOURS PRN
Status: DISCONTINUED | OUTPATIENT
Start: 2021-01-01 | End: 2021-01-01 | Stop reason: HOSPADM

## 2021-01-01 RX ORDER — NOREPINEPHRINE BIT/0.9 % NACL 8 MG/250ML
.02-.3 INFUSION BOTTLE (ML) INTRAVENOUS
Status: DISCONTINUED | OUTPATIENT
Start: 2021-01-01 | End: 2021-01-01

## 2021-01-01 RX ORDER — POTASSIUM CHLORIDE 1.5 G/1.77G
40 POWDER, FOR SOLUTION ORAL AS NEEDED
Status: DISCONTINUED | OUTPATIENT
Start: 2021-01-01 | End: 2021-01-01 | Stop reason: HOSPADM

## 2021-01-01 RX ADMIN — ACETAMINOPHEN 650 MG: 650 SUPPOSITORY RECTAL at 15:58

## 2021-01-01 RX ADMIN — Medication 5 MG: at 22:31

## 2021-01-01 RX ADMIN — METOPROLOL TARTRATE 37.5 MG: 25 TABLET, FILM COATED ORAL at 09:25

## 2021-01-01 RX ADMIN — SODIUM CHLORIDE, POTASSIUM CHLORIDE, SODIUM LACTATE AND CALCIUM CHLORIDE 75 ML/HR: 600; 310; 30; 20 INJECTION, SOLUTION INTRAVENOUS at 20:47

## 2021-01-01 RX ADMIN — HEPARIN SODIUM 5000 UNITS: 5000 INJECTION, SOLUTION INTRAVENOUS; SUBCUTANEOUS at 20:34

## 2021-01-01 RX ADMIN — KETOROLAC TROMETHAMINE 15 MG: 30 INJECTION, SOLUTION INTRAMUSCULAR at 21:10

## 2021-01-01 RX ADMIN — ACETAMINOPHEN 650 MG: 650 SUPPOSITORY RECTAL at 09:35

## 2021-01-01 RX ADMIN — POTASSIUM CHLORIDE 10 MEQ: 7.46 INJECTION, SOLUTION INTRAVENOUS at 02:18

## 2021-01-01 RX ADMIN — LEVOTHYROXINE SODIUM 75 MCG: 0.07 TABLET ORAL at 05:52

## 2021-01-01 RX ADMIN — LORAZEPAM 0.5 MG: 2 INJECTION INTRAMUSCULAR; INTRAVENOUS at 16:26

## 2021-01-01 RX ADMIN — METOPROLOL TARTRATE 25 MG: 25 TABLET, FILM COATED ORAL at 09:54

## 2021-01-01 RX ADMIN — MIRTAZAPINE 15 MG: 15 TABLET, ORALLY DISINTEGRATING ORAL at 22:29

## 2021-01-01 RX ADMIN — HEPARIN SODIUM 5000 UNITS: 5000 INJECTION, SOLUTION INTRAVENOUS; SUBCUTANEOUS at 07:53

## 2021-01-01 RX ADMIN — HYDROMORPHONE HYDROCHLORIDE 0.25 MG: 1 INJECTION, SOLUTION INTRAMUSCULAR; INTRAVENOUS; SUBCUTANEOUS at 04:36

## 2021-01-01 RX ADMIN — SODIUM CHLORIDE 1000 ML: 9 INJECTION, SOLUTION INTRAVENOUS at 06:27

## 2021-01-01 RX ADMIN — SODIUM CHLORIDE 1 G: 900 INJECTION INTRAVENOUS at 11:19

## 2021-01-01 RX ADMIN — METOPROLOL TARTRATE 12.5 MG: 25 TABLET, FILM COATED ORAL at 10:36

## 2021-01-01 RX ADMIN — POTASSIUM CHLORIDE 10 MEQ: 7.46 INJECTION, SOLUTION INTRAVENOUS at 21:58

## 2021-01-01 RX ADMIN — IPRATROPIUM BROMIDE AND ALBUTEROL SULFATE 3 ML: 2.5; .5 SOLUTION RESPIRATORY (INHALATION) at 06:45

## 2021-01-01 RX ADMIN — HYDROMORPHONE HYDROCHLORIDE 0.25 MG: 1 INJECTION, SOLUTION INTRAMUSCULAR; INTRAVENOUS; SUBCUTANEOUS at 09:37

## 2021-01-01 RX ADMIN — POTASSIUM CHLORIDE 10 MEQ: 7.46 INJECTION, SOLUTION INTRAVENOUS at 13:44

## 2021-01-01 RX ADMIN — SODIUM CHLORIDE 2 G: 900 INJECTION INTRAVENOUS at 16:22

## 2021-01-01 RX ADMIN — ACETAMINOPHEN 650 MG: 650 SUPPOSITORY RECTAL at 22:24

## 2021-01-01 RX ADMIN — POTASSIUM CHLORIDE 40 MEQ: 750 CAPSULE, EXTENDED RELEASE ORAL at 06:28

## 2021-01-01 RX ADMIN — METOPROLOL TARTRATE 25 MG: 25 TABLET, FILM COATED ORAL at 10:59

## 2021-01-01 RX ADMIN — SODIUM CHLORIDE, POTASSIUM CHLORIDE, SODIUM LACTATE AND CALCIUM CHLORIDE 75 ML/HR: 600; 310; 30; 20 INJECTION, SOLUTION INTRAVENOUS at 22:42

## 2021-01-01 RX ADMIN — ADENOSINE 6 MG: 3 INJECTION, SOLUTION INTRAVENOUS at 08:25

## 2021-01-01 RX ADMIN — HEPARIN SODIUM 5000 UNITS: 5000 INJECTION, SOLUTION INTRAVENOUS; SUBCUTANEOUS at 20:57

## 2021-01-01 RX ADMIN — LEVOTHYROXINE SODIUM 75 MCG: 0.07 TABLET ORAL at 05:28

## 2021-01-01 RX ADMIN — IPRATROPIUM BROMIDE AND ALBUTEROL SULFATE 3 ML: 2.5; .5 SOLUTION RESPIRATORY (INHALATION) at 13:57

## 2021-01-01 RX ADMIN — ACETAMINOPHEN 650 MG: 650 SUPPOSITORY RECTAL at 10:50

## 2021-01-01 RX ADMIN — HYDROMORPHONE HYDROCHLORIDE 0.5 MG: 1 INJECTION, SOLUTION INTRAMUSCULAR; INTRAVENOUS; SUBCUTANEOUS at 23:42

## 2021-01-01 RX ADMIN — HEPARIN SODIUM 5000 UNITS: 5000 INJECTION INTRAVENOUS; SUBCUTANEOUS at 09:28

## 2021-01-01 RX ADMIN — LEVOTHYROXINE SODIUM 75 MCG: 0.07 TABLET ORAL at 06:28

## 2021-01-01 RX ADMIN — HYDROMORPHONE HYDROCHLORIDE 0.5 MG: 1 INJECTION, SOLUTION INTRAMUSCULAR; INTRAVENOUS; SUBCUTANEOUS at 15:27

## 2021-01-01 RX ADMIN — HEPARIN SODIUM 5000 UNITS: 5000 INJECTION, SOLUTION INTRAVENOUS; SUBCUTANEOUS at 10:35

## 2021-01-01 RX ADMIN — GLYCOPYRROLATE 0.2 MG: 0.2 INJECTION INTRAMUSCULAR; INTRAVENOUS at 21:09

## 2021-01-01 RX ADMIN — SODIUM CHLORIDE, PRESERVATIVE FREE 10 ML: 5 INJECTION INTRAVENOUS at 09:28

## 2021-01-01 RX ADMIN — SODIUM CHLORIDE 1 G: 900 INJECTION INTRAVENOUS at 09:33

## 2021-01-01 RX ADMIN — GLYCOPYRROLATE 0.6 MG: 0.2 INJECTION INTRAMUSCULAR; INTRAVENOUS at 17:00

## 2021-01-01 RX ADMIN — SODIUM CHLORIDE, POTASSIUM CHLORIDE, SODIUM LACTATE AND CALCIUM CHLORIDE 75 ML/HR: 600; 310; 30; 20 INJECTION, SOLUTION INTRAVENOUS at 23:43

## 2021-01-01 RX ADMIN — CEFEPIME HYDROCHLORIDE 2 G: 2 INJECTION, POWDER, FOR SOLUTION INTRAVENOUS at 06:39

## 2021-01-01 RX ADMIN — SODIUM CHLORIDE 500 ML: 9 INJECTION, SOLUTION INTRAVENOUS at 05:03

## 2021-01-01 RX ADMIN — HEPARIN SODIUM 5000 UNITS: 5000 INJECTION, SOLUTION INTRAVENOUS; SUBCUTANEOUS at 22:29

## 2021-01-01 RX ADMIN — VANCOMYCIN HYDROCHLORIDE 750 MG: 750 INJECTION, SOLUTION INTRAVENOUS at 05:02

## 2021-01-01 RX ADMIN — METOPROLOL TARTRATE 25 MG: 25 TABLET, FILM COATED ORAL at 22:28

## 2021-01-01 RX ADMIN — ACETAMINOPHEN 650 MG: 650 SUPPOSITORY RECTAL at 10:23

## 2021-01-01 RX ADMIN — HYDROMORPHONE HYDROCHLORIDE 0.25 MG: 1 INJECTION, SOLUTION INTRAMUSCULAR; INTRAVENOUS; SUBCUTANEOUS at 16:49

## 2021-01-01 RX ADMIN — METOPROLOL TARTRATE 12.5 MG: 25 TABLET, FILM COATED ORAL at 20:57

## 2021-01-01 RX ADMIN — HEPARIN SODIUM 5000 UNITS: 5000 INJECTION, SOLUTION INTRAVENOUS; SUBCUTANEOUS at 22:23

## 2021-01-01 RX ADMIN — GLYCOPYRROLATE 0.6 MG: 0.2 INJECTION INTRAMUSCULAR; INTRAVENOUS at 21:30

## 2021-01-01 RX ADMIN — ACETAMINOPHEN 650 MG: 325 TABLET, FILM COATED ORAL at 20:59

## 2021-01-01 RX ADMIN — LORAZEPAM 0.25 MG: 2 INJECTION INTRAMUSCULAR; INTRAVENOUS at 05:00

## 2021-01-01 RX ADMIN — POTASSIUM CHLORIDE 10 MEQ: 7.46 INJECTION, SOLUTION INTRAVENOUS at 23:43

## 2021-01-01 RX ADMIN — HEPARIN SODIUM 5000 UNITS: 5000 INJECTION, SOLUTION INTRAVENOUS; SUBCUTANEOUS at 09:54

## 2021-01-01 RX ADMIN — METOPROLOL TARTRATE 25 MG: 25 TABLET, FILM COATED ORAL at 20:48

## 2021-01-01 RX ADMIN — MAGNESIUM SULFATE HEPTAHYDRATE 4 G: 40 INJECTION, SOLUTION INTRAVENOUS at 22:00

## 2021-01-01 RX ADMIN — HEPARIN SODIUM 5000 UNITS: 5000 INJECTION, SOLUTION INTRAVENOUS; SUBCUTANEOUS at 09:35

## 2021-01-01 RX ADMIN — HYDROMORPHONE HYDROCHLORIDE 0.25 MG: 1 INJECTION, SOLUTION INTRAMUSCULAR; INTRAVENOUS; SUBCUTANEOUS at 04:38

## 2021-01-01 RX ADMIN — HYDROMORPHONE HYDROCHLORIDE 0.5 MG: 1 INJECTION, SOLUTION INTRAMUSCULAR; INTRAVENOUS; SUBCUTANEOUS at 16:50

## 2021-01-01 RX ADMIN — HYDROMORPHONE HYDROCHLORIDE 0.25 MG: 1 INJECTION, SOLUTION INTRAMUSCULAR; INTRAVENOUS; SUBCUTANEOUS at 04:21

## 2021-01-01 RX ADMIN — HYDROMORPHONE HYDROCHLORIDE 0.25 MG: 1 INJECTION, SOLUTION INTRAMUSCULAR; INTRAVENOUS; SUBCUTANEOUS at 14:24

## 2021-01-01 RX ADMIN — HYDROMORPHONE HYDROCHLORIDE 0.25 MG: 1 INJECTION, SOLUTION INTRAMUSCULAR; INTRAVENOUS; SUBCUTANEOUS at 01:01

## 2021-01-01 RX ADMIN — METOPROLOL TARTRATE 5 MG: 5 INJECTION INTRAVENOUS at 08:40

## 2021-01-01 RX ADMIN — POLYVINYL ALCOHOL 2 DROP: 14 SOLUTION/ DROPS OPHTHALMIC at 20:45

## 2021-01-01 RX ADMIN — MINERAL OIL: 1000 LIQUID ORAL at 20:45

## 2021-01-01 RX ADMIN — GLYCOPYRROLATE 0.6 MG: 0.2 INJECTION INTRAMUSCULAR; INTRAVENOUS at 04:21

## 2021-01-01 RX ADMIN — LORAZEPAM 0.5 MG: 2 INJECTION INTRAMUSCULAR; INTRAVENOUS at 23:43

## 2021-01-01 RX ADMIN — ACETAMINOPHEN 650 MG: 325 TABLET, FILM COATED ORAL at 12:22

## 2021-01-01 RX ADMIN — SODIUM CHLORIDE, PRESERVATIVE FREE 10 ML: 5 INJECTION INTRAVENOUS at 21:30

## 2021-01-01 RX ADMIN — METOPROLOL TARTRATE 25 MG: 25 TABLET, FILM COATED ORAL at 09:35

## 2021-01-01 RX ADMIN — POTASSIUM CHLORIDE 10 MEQ: 7.46 INJECTION, SOLUTION INTRAVENOUS at 01:14

## 2021-01-01 RX ADMIN — LORAZEPAM 0.5 MG: 2 INJECTION INTRAMUSCULAR; INTRAVENOUS at 12:11

## 2021-01-01 RX ADMIN — SODIUM CHLORIDE 1 G: 900 INJECTION INTRAVENOUS at 09:54

## 2021-01-01 RX ADMIN — MAGNESIUM SULFATE HEPTAHYDRATE 4 G: 40 INJECTION, SOLUTION INTRAVENOUS at 13:44

## 2021-01-01 RX ADMIN — SODIUM CHLORIDE 250 ML: 9 INJECTION, SOLUTION INTRAVENOUS at 21:59

## 2021-01-01 RX ADMIN — POTASSIUM CHLORIDE 10 MEQ: 7.46 INJECTION, SOLUTION INTRAVENOUS at 15:23

## 2021-01-01 RX ADMIN — LEVOTHYROXINE SODIUM 75 MCG: 0.07 TABLET ORAL at 06:30

## 2021-01-01 RX ADMIN — HALOPERIDOL LACTATE 1 MG: 5 INJECTION, SOLUTION INTRAMUSCULAR at 16:48

## 2021-01-01 RX ADMIN — HEPARIN SODIUM 5000 UNITS: 5000 INJECTION, SOLUTION INTRAVENOUS; SUBCUTANEOUS at 10:23

## 2021-01-01 RX ADMIN — POTASSIUM CHLORIDE 40 MEQ: 1.5 POWDER, FOR SOLUTION ORAL at 00:59

## 2021-01-01 RX ADMIN — SODIUM CHLORIDE 500 ML: 9 INJECTION, SOLUTION INTRAVENOUS at 08:40

## 2021-01-01 RX ADMIN — IPRATROPIUM BROMIDE AND ALBUTEROL SULFATE 3 ML: 2.5; .5 SOLUTION RESPIRATORY (INHALATION) at 00:05

## 2021-01-01 RX ADMIN — SODIUM CHLORIDE, POTASSIUM CHLORIDE, SODIUM LACTATE AND CALCIUM CHLORIDE 75 ML/HR: 600; 310; 30; 20 INJECTION, SOLUTION INTRAVENOUS at 04:33

## 2021-01-01 RX ADMIN — HALOPERIDOL LACTATE 1 MG: 5 INJECTION, SOLUTION INTRAMUSCULAR at 21:10

## 2021-01-01 RX ADMIN — SODIUM CHLORIDE, PRESERVATIVE FREE 10 ML: 5 INJECTION INTRAVENOUS at 22:43

## 2021-01-01 RX ADMIN — BUMETANIDE 0.5 MG: 0.25 INJECTION INTRAMUSCULAR; INTRAVENOUS at 20:45

## 2021-01-01 RX ADMIN — MINERAL OIL: 1000 LIQUID ORAL at 16:59

## 2021-01-01 RX ADMIN — POTASSIUM CHLORIDE 10 MEQ: 7.46 INJECTION, SOLUTION INTRAVENOUS at 18:13

## 2021-01-01 RX ADMIN — LORAZEPAM 0.5 MG: 2 INJECTION INTRAMUSCULAR; INTRAVENOUS at 16:50

## 2021-01-01 RX ADMIN — HEPARIN SODIUM 5000 UNITS: 5000 INJECTION INTRAVENOUS; SUBCUTANEOUS at 21:30

## 2021-01-01 RX ADMIN — Medication 5 MG: at 21:58

## 2021-01-01 RX ADMIN — POLYVINYL ALCOHOL 2 DROP: 14 SOLUTION/ DROPS OPHTHALMIC at 17:49

## 2021-01-01 RX ADMIN — GLYCOPYRROLATE 0.6 MG: 0.2 INJECTION INTRAMUSCULAR; INTRAVENOUS at 09:28

## 2021-01-01 RX ADMIN — SODIUM CHLORIDE 1 G: 900 INJECTION INTRAVENOUS at 09:35

## 2021-01-01 RX ADMIN — POTASSIUM CHLORIDE 10 MEQ: 7.46 INJECTION, SOLUTION INTRAVENOUS at 16:29

## 2021-01-01 RX ADMIN — Medication 5 MG: at 22:43

## 2021-01-01 RX ADMIN — METOPROLOL TARTRATE 25 MG: 25 TABLET, FILM COATED ORAL at 10:34

## 2021-01-01 RX ADMIN — Medication 27 MG: at 09:25

## 2021-01-01 RX ADMIN — BUMETANIDE 0.5 MG: 0.25 INJECTION INTRAMUSCULAR; INTRAVENOUS at 12:11

## 2021-01-01 RX ADMIN — SODIUM CHLORIDE 500 ML: 9 INJECTION, SOLUTION INTRAVENOUS at 05:04

## 2021-01-01 RX ADMIN — HEPARIN SODIUM 5000 UNITS: 5000 INJECTION, SOLUTION INTRAVENOUS; SUBCUTANEOUS at 22:43

## 2021-01-01 RX ADMIN — ACETAMINOPHEN 650 MG: 650 SUPPOSITORY RECTAL at 18:06

## 2021-01-01 RX ADMIN — MIRTAZAPINE 15 MG: 15 TABLET, ORALLY DISINTEGRATING ORAL at 20:34

## 2021-01-01 RX ADMIN — METOPROLOL TARTRATE 12.5 MG: 25 TABLET, FILM COATED ORAL at 22:31

## 2021-01-01 RX ADMIN — HEPARIN SODIUM 5000 UNITS: 5000 INJECTION, SOLUTION INTRAVENOUS; SUBCUTANEOUS at 20:47

## 2021-01-01 RX ADMIN — ACETAMINOPHEN 650 MG: 650 SUPPOSITORY RECTAL at 15:31

## 2021-01-01 RX ADMIN — SCOPALAMINE 1 PATCH: 1 PATCH, EXTENDED RELEASE TRANSDERMAL at 10:08

## 2021-01-01 RX ADMIN — SCOPALAMINE 1 PATCH: 1 PATCH, EXTENDED RELEASE TRANSDERMAL at 12:14

## 2021-01-01 RX ADMIN — CEFEPIME HYDROCHLORIDE 2 G: 2 INJECTION, POWDER, FOR SOLUTION INTRAVENOUS at 05:03

## 2021-01-01 RX ADMIN — METOPROLOL TARTRATE 12.5 MG: 25 TABLET, FILM COATED ORAL at 09:00

## 2021-01-01 RX ADMIN — ACETAMINOPHEN 650 MG: 650 SUPPOSITORY RECTAL at 14:22

## 2021-01-01 RX ADMIN — HYDROMORPHONE HYDROCHLORIDE 0.25 MG: 1 INJECTION, SOLUTION INTRAMUSCULAR; INTRAVENOUS; SUBCUTANEOUS at 11:01

## 2021-01-01 RX ADMIN — SODIUM CHLORIDE, POTASSIUM CHLORIDE, SODIUM LACTATE AND CALCIUM CHLORIDE 75 ML/HR: 600; 310; 30; 20 INJECTION, SOLUTION INTRAVENOUS at 10:11

## 2021-01-01 RX ADMIN — HEPARIN SODIUM 5000 UNITS: 5000 INJECTION, SOLUTION INTRAVENOUS; SUBCUTANEOUS at 00:25

## 2021-01-01 RX ADMIN — METOPROLOL TARTRATE 37.5 MG: 25 TABLET, FILM COATED ORAL at 22:42

## 2021-01-01 RX ADMIN — METOPROLOL TARTRATE 25 MG: 25 TABLET, FILM COATED ORAL at 20:34

## 2021-01-01 RX ADMIN — LORAZEPAM 0.5 MG: 2 INJECTION INTRAMUSCULAR; INTRAVENOUS at 21:09

## 2021-01-01 RX ADMIN — HYDROMORPHONE HYDROCHLORIDE 0.5 MG: 1 INJECTION, SOLUTION INTRAMUSCULAR; INTRAVENOUS; SUBCUTANEOUS at 12:55

## 2021-01-01 RX ADMIN — LEVOTHYROXINE SODIUM 50 MCG: 20 INJECTION, SOLUTION INTRAVENOUS at 10:08

## 2021-01-01 RX ADMIN — LORAZEPAM 0.25 MG: 2 INJECTION INTRAMUSCULAR; INTRAVENOUS at 04:39

## 2021-01-01 RX ADMIN — SODIUM CHLORIDE 1000 ML: 9 INJECTION, SOLUTION INTRAVENOUS at 06:25

## 2021-01-01 RX ADMIN — ACETAMINOPHEN ORAL SOLUTION 649.6 MG: 650 SOLUTION ORAL at 14:41

## 2021-01-01 RX ADMIN — LORAZEPAM 0.25 MG: 2 INJECTION INTRAMUSCULAR; INTRAVENOUS at 04:35

## 2021-01-01 RX ADMIN — LORAZEPAM 0.25 MG: 2 INJECTION INTRAMUSCULAR; INTRAVENOUS at 01:43

## 2021-01-01 RX ADMIN — MIRTAZAPINE 15 MG: 15 TABLET, ORALLY DISINTEGRATING ORAL at 20:48

## 2021-01-01 RX ADMIN — LEVOTHYROXINE SODIUM 75 MCG: 0.07 TABLET ORAL at 10:36

## 2021-01-01 RX ADMIN — Medication 5 MG: at 22:35

## 2021-01-01 RX ADMIN — MIRTAZAPINE 15 MG: 15 TABLET, ORALLY DISINTEGRATING ORAL at 22:44

## 2021-01-01 RX ADMIN — SODIUM CHLORIDE, PRESERVATIVE FREE 10 ML: 5 INJECTION INTRAVENOUS at 10:00

## 2021-01-01 RX ADMIN — HYDROMORPHONE HYDROCHLORIDE 0.5 MG: 1 INJECTION, SOLUTION INTRAMUSCULAR; INTRAVENOUS; SUBCUTANEOUS at 20:45

## 2021-01-01 RX ADMIN — LORAZEPAM 0.5 MG: 2 INJECTION INTRAMUSCULAR; INTRAVENOUS at 15:27

## 2021-10-15 PROBLEM — I48.91 ATRIAL FIBRILLATION (HCC): Status: ACTIVE | Noted: 2021-01-01

## 2021-10-15 PROBLEM — E03.9 HYPOTHYROIDISM: Status: ACTIVE | Noted: 2021-01-01

## 2021-10-15 PROBLEM — G89.29 CHRONIC BACK PAIN: Status: ACTIVE | Noted: 2021-01-01

## 2021-10-15 PROBLEM — M54.9 CHRONIC BACK PAIN: Status: ACTIVE | Noted: 2021-01-01

## 2021-10-15 PROBLEM — Z85.3 HISTORY OF BREAST CANCER: Status: ACTIVE | Noted: 2021-01-01

## 2021-10-15 PROBLEM — I10 ESSENTIAL HYPERTENSION: Status: ACTIVE | Noted: 2021-01-01

## 2021-10-15 PROBLEM — F03.90 DEMENTIA (HCC): Status: ACTIVE | Noted: 2021-01-01

## 2021-10-15 PROBLEM — N30.00 ACUTE CYSTITIS WITHOUT HEMATURIA: Status: ACTIVE | Noted: 2021-01-01

## 2021-10-15 NOTE — ED PROVIDER NOTES
Subjective   Patient presents to the ER from [facility] for worsening mental status and diarrhea over the last several days.  She does have a history of dementia.  Apparently patient is living in a facility that can no longer accommodate her giving her worsening dementia and was sent here for further evaluation.  At this point I have no family contact information.      History limited by:  Dementia  Weakness - Generalized      Review of Systems   Unable to perform ROS: Dementia       No past medical history on file.    Allergies   Allergen Reactions   • Codeine Unknown - High Severity   • Levothyroxine Unknown - Low Severity   • Penicillins Unknown - High Severity       No past surgical history on file.    No family history on file.    Social History     Socioeconomic History   • Marital status:            Objective   Physical Exam  Constitutional:       Appearance: She is well-developed. She is ill-appearing.      Comments: Frail elderly.   HENT:      Head: Normocephalic and atraumatic.      Right Ear: External ear normal.      Left Ear: External ear normal.      Nose: Nose normal.   Eyes:      Conjunctiva/sclera: Conjunctivae normal.      Pupils: Pupils are equal, round, and reactive to light.   Cardiovascular:      Rate and Rhythm: Normal rate and regular rhythm.      Heart sounds: Normal heart sounds.   Pulmonary:      Effort: Pulmonary effort is normal.      Breath sounds: Normal breath sounds.   Abdominal:      General: Bowel sounds are normal.      Palpations: Abdomen is soft.   Musculoskeletal:         General: Normal range of motion.      Cervical back: Normal range of motion and neck supple.   Skin:     General: Skin is warm and dry.   Neurological:      Mental Status: She is alert and oriented to person, place, and time. Mental status is at baseline.   Psychiatric:         Behavior: Behavior normal.         Thought Content: Thought content normal.         Judgment: Judgment normal.  "        Procedures           ED Course  ED Course as of 10/15/21 1857   Fri Oct 15, 2021   1623 Patient actively trying to get out of bed.  She is confused given her baseline history of dementia.  We will give her a small dose of Ativan.  Her urine is grossly infected.  Plan on admitting her once we get some more tests done. [JM]   1737 Awaiting CT reading. [JM]   1809 Awaiting CTs [JM]   1845 Nurse attempted to contact family unsuccessfully but left a message. Steph Espino [phone] [JM]   1856 I spoke with Dr. Asencio who will admit. [JM]      ED Course User Index  [JM] Ross Olsen APRN           CT Head Without Contrast   Preliminary Result   Age appropriate generalized cerebral atrophy. No evidence of   acute intracranial disease is seen.       DICTATED:   10/15/2021   EDITED/ls :   10/15/2021          CT Chest Without Contrast Diagnostic   Preliminary Result   No evidence of active chest disease.       ABDOMEN AND PELVIS CT SCAN WITHOUT CONTRAST: No significant   abnormalities are seen of the liver, spleen, pancreas, adrenal glands,   or kidneys for a non-IV contrast enhanced exam. Bowel loops are not   significantly distended, although there is slightly increased small   bowel gas. No bowel wall edema or pneumatosis is seen. Bladder is   distended, and appears slightly thick-walled fluid for the degree of   distention present, in particular, the anterior wall of the bladder   appears thickened, almost masslike as seen on sagittal image #68 series   900. Maximal oblique diameter of the bladder is 13 mm. Uterus and   ovaries are not identified, either absent or atrophic. No intrapelvic   free fluid or inflammatory change is seen. Bony structures appear to be   intact. Sclerotic changes of the left sacrum suggest an old healed   insufficiency fracture here.       IMPRESSION:    1. Slightly \"gassy\" appearance the abdomen but no evidence of   significant ileus or obstruction.       2. Distended bladder, with " "asymmetric thickening of the bladder dome, at   least potentially a sessile mass.       3. No evidence of acute inflammatory process or other clearly acute   intra-abdominal or intrapelvic disease.       4. Pattern of sclerosis of the left sacrum suggesting old healed sacral   insufficiency fracture.       DICTATED:   10/15/2021   EDITED/ls :   10/15/2021              CT Abdomen Pelvis Without Contrast   Preliminary Result   No evidence of active chest disease.       ABDOMEN AND PELVIS CT SCAN WITHOUT CONTRAST: No significant   abnormalities are seen of the liver, spleen, pancreas, adrenal glands,   or kidneys for a non-IV contrast enhanced exam. Bowel loops are not   significantly distended, although there is slightly increased small   bowel gas. No bowel wall edema or pneumatosis is seen. Bladder is   distended, and appears slightly thick-walled fluid for the degree of   distention present, in particular, the anterior wall of the bladder   appears thickened, almost masslike as seen on sagittal image #68 series   900. Maximal oblique diameter of the bladder is 13 mm. Uterus and   ovaries are not identified, either absent or atrophic. No intrapelvic   free fluid or inflammatory change is seen. Bony structures appear to be   intact. Sclerotic changes of the left sacrum suggest an old healed   insufficiency fracture here.       IMPRESSION:    1. Slightly \"gassy\" appearance the abdomen but no evidence of   significant ileus or obstruction.       2. Distended bladder, with asymmetric thickening of the bladder dome, at   least potentially a sessile mass.       3. No evidence of acute inflammatory process or other clearly acute   intra-abdominal or intrapelvic disease.       4. Pattern of sclerosis of the left sacrum suggesting old healed sacral   insufficiency fracture.       DICTATED:   10/15/2021   EDITED/ls :   10/15/2021                                                MDM    Final diagnoses:   Acute cystitis without " hematuria   Bladder mass   Confusion   Dementia without behavioral disturbance, unspecified dementia type (HCC)   Atrial fibrillation, unspecified type (HCC)       ED Disposition  ED Disposition     ED Disposition Condition Comment    Decision to Admit  Level of Care: Telemetry [5]   Diagnosis: Acute cystitis without hematuria [379125]   Admitting Physician: ARCHIE SORIA [[id number]]   Attending Physician: ARCHIE SORIA [[id number]]   Certification: I Certify That Inpatient Hospital Services Are Medically Necessary For Greater Than 2 Midnights            No follow-up provider specified.       Medication List      No changes were made to your prescriptions during this visit.          Ross Olsen APRN  10/15/21 1843       Ross Olsen APRN  10/15/21 1846       Ross Olsen APRN  10/15/21 1855

## 2021-10-15 NOTE — H&P
[facility] Medicine Services  HISTORY AND PHYSICAL    Patient Name: Mary Ann Cooper  : 3/31/1930  MRN: [id number]  Primary Care Physician: Christi Gold MD  Date of admission: 10/15/2021    Subjective   Subjective     Chief Complaint:  Confusion, diarrhea     HPI:  Mary Ann Cooper is a 89 y.o. female with a past medical history significant for breast cancer s/p bilateral mastectomy, hypothyroidism, degenerative disc disease s/p kyphoplasty  and dementia presents to the ED via EMS due to increased confusion and diarrhea reports by nursing staff at [facility].  [facility] HPI was obtained by Daughter in law, Steph Espino via phone.  She reports patient recently had kyphoplasty by Dr. Eugene at [facility] approximately 4 weeks ago for chronic back pain.  Unfortunately she notes her pain has not improved.  She did received a call tonight from nursing facility due to increased confusion.  Daughter in law is currently in [place] but will be back tomorrow am.  She is unaware of any known fever, chills, nausea, vomiting, abdominal pain, chest pain.  Upon arrival to the ED patient was noted to be in Atrial fibrillation RVR with HR of 121.  Daughter in law confirms patient has no underlying history.  Currently she continues to be in afib but is rate controlled.  Workup in the ED reveals UA consistent with UTI.  CT of abdomen and pelvis is concerning for ? Sessile mass due to asymmetric thickening of the bladder dome.  Patient will be admitted to [facility] under the care of the Hospitalist for further evaluation and treatment.       COVID Details:    Symptoms:    [x] NONE [] Fever []  Cough [] Shortness of breath [] Change in taste/smell      The patient has a COVID HM Topic on their chart, and they are fully vaccinated.      Review of Systems   Unable to perform ROS: Dementia        All other systems reviewed and are negative.     Personal  History     Past Medical History:   Diagnosis Date   • Cancer (HCC)    • Disease of thyroid gland    • Hypertension        Past Surgical History:   Procedure Laterality Date   • BREAST SURGERY     • HYSTERECTOMY         Family History: family history is not on file. Otherwise pertinent FHx was reviewed and unremarkable.     Social History:  reports that she has never smoked. She has never used smokeless tobacco. She reports that she does not drink alcohol and does not use drugs.  Social History     Social History Narrative    Resides at [facility]        Medications:       Allergies   Allergen Reactions   • Codeine Unknown - High Severity   • Levothyroxine Unknown - Low Severity   • Penicillins Unknown - High Severity       Objective   Objective     Vital Signs:   Temp:  [98.6 °F (37 °C)] 98.6 °F (37 °C)  Heart Rate:  [93-96] 96  Resp:  [16] 16  BP: (125)/(74) 125/74    Physical Exam  Vitals reviewed.   Constitutional:       General: She is not in acute distress.     Appearance: She is ill-appearing. She is not toxic-appearing or diaphoretic.      Comments:   Cachetic    HENT:      Head: Normocephalic and atraumatic.      Nose: Nose normal.      Mouth/Throat:      Mouth: Mucous membranes are dry.      Pharynx: Oropharynx is clear.   Eyes:      General: No scleral icterus.        Right eye: No discharge.         Left eye: No discharge.      Extraocular Movements: Extraocular movements intact.      Conjunctiva/sclera: Conjunctivae normal.      Pupils: Pupils are equal, round, and reactive to light.   Cardiovascular:      Rate and Rhythm: Normal rate. Rhythm irregular.      Pulses: Normal pulses.      Heart sounds: Normal heart sounds.   Pulmonary:      Effort: Pulmonary effort is normal.      Breath sounds: Normal breath sounds.   Abdominal:      General: Abdomen is flat. Bowel sounds are normal. There is no distension.      Palpations: Abdomen is soft. There is no mass.      Tenderness: There is no  abdominal tenderness. There is no right CVA tenderness, left CVA tenderness, guarding or rebound.      Hernia: No hernia is present.   Musculoskeletal:         General: No swelling, tenderness, deformity or signs of injury. Normal range of motion.      Cervical back: Normal range of motion and neck supple.      Right lower leg: No edema.      Left lower leg: No edema.   Skin:     General: Skin is warm and dry.      Comments: Left breast with noted fluid/edema    Neurological:      General: No focal deficit present.      Mental Status: She is alert. Mental status is at baseline.      Comments: Able to tell me her name only    Psychiatric:         Mood and Affect: Mood normal.      Comments: Unable to follow most commands.              Results Reviewed:  I have personally reviewed most recent indicated data and agree with findings including:  [x]  Laboratory  [x]  Radiology  [x]  EKG/Telemetry  []  Pathology  []  Cardiac/Vascular Studies  []  Old records  []  Other:  Most pertinent findings include:    LAB RESULTS:      Lab 10/15/21  1543   WBC 6.57   HEMOGLOBIN 15.6   HEMATOCRIT 45.3   PLATELETS 305   NEUTROS ABS 3.88   IMMATURE GRANS (ABS) 0.02   LYMPHS ABS 1.55   MONOS ABS 0.72   EOS ABS 0.34   MCV 89.5         Lab 10/15/21  1543   SODIUM 141   POTASSIUM 3.2*   CHLORIDE 103   CO2 29.0   ANION GAP 9.0   BUN 22   CREATININE 0.93   GLUCOSE 135*   CALCIUM 9.4         Lab 10/15/21  1543   TOTAL PROTEIN 6.6   ALBUMIN 3.70   GLOBULIN 2.9   ALT (SGPT) 21   AST (SGOT) 30   BILIRUBIN 0.5   ALK PHOS 258*   LIPASE 69*         Lab 10/15/21  1543   PROBNP 989.9   TROPONIN T <0.010                 Brief Urine Lab Results  (Last result in the past 365 days)      Color   Clarity   Blood   Leuk Est   Nitrite   Protein   CREAT   Urine HCG        10/15/21 1544 Yellow   Clear   Trace   Trace   Positive   100 mg/dL (2+)               Microbiology Results (last 10 days)     Procedure Component Value - Date/Time    COVID PRE-OP /  PRE-PROCEDURE SCREENING ORDER (NO ISOLATION) - Swab, Nasopharynx [[id number]]  (Normal) Collected: 10/15/21 1737    Lab Status: Final result Specimen: Swab from Nasopharynx Updated: 10/15/21 1807    Narrative:      The following orders were created for panel order COVID PRE-OP / PRE-PROCEDURE SCREENING ORDER (NO ISOLATION) - Swab, Nasopharynx.  Procedure                               Abnormality         Status                     ---------                               -----------         ------                     COVID-19 and FLU A/B PCR...[[id number]]  Normal              Final result                 Please view results for these tests on the individual orders.    COVID-19 and FLU A/B PCR - Swab, Nasopharynx [[id number]]  (Normal) Collected: 10/15/21 1737    Lab Status: Final result Specimen: Swab from Nasopharynx Updated: 10/15/21 1807     COVID19 Not Detected     Influenza A PCR Not Detected     Influenza B PCR Not Detected    Narrative:      Fact sheet for providers: https://www.fda.gov/media/886332/download    Fact sheet for patients: https://www.fda.gov/media/482756/download    Test performed by PCR.          CT Abdomen Pelvis Without Contrast    Result Date: 10/15/2021  EXAMINATION: CT CHEST WO CONTRAST DIAGNOSTIC, CT ABDOMEN/ PELVIS WO CONTRAST - 10/15/2021  INDICATION: Generalized weakness, worsening.  TECHNIQUE: 5 mm unenhanced images through the chest, abdomen and pelvis.  The radiation dose reduction device was turned on for each scan per the ALARA (As Low as Reasonably Achievable) protocol.  COMPARISON: None  FINDINGS: History indicates worsening generalized weakness.  CT SCAN OF THE CHEST WITHOUT CONTRAST: No significant mediastinal hilar or axillary adenopathy is appreciated. There is no evidence of pericardial or pleural effusion. Lung window images show densely calcified 13 mm right lower lobe nodule, and mild linear scarring in both lung bases. No evidence of pneumonia is identified. Note is made of  "previous lower thoracic kyphoplasties.      Impression: No evidence of active chest disease.  ABDOMEN AND PELVIS CT SCAN WITHOUT CONTRAST: No significant abnormalities are seen of the liver, spleen, pancreas, adrenal glands, or kidneys for a non-IV contrast enhanced exam. Bowel loops are not significantly distended, although there is slightly increased small bowel gas. No bowel wall edema or pneumatosis is seen. Bladder is distended, and appears slightly thick-walled fluid for the degree of distention present, in particular, the anterior wall of the bladder appears thickened, almost masslike as seen on sagittal image #68 series 900. Maximal oblique diameter of the bladder is 13 mm. Uterus and ovaries are not identified, either absent or atrophic. No intrapelvic free fluid or inflammatory change is seen. Bony structures appear to be intact. Sclerotic changes of the left sacrum suggest an old healed insufficiency fracture here.  IMPRESSION: 1. Slightly \"gassy\" appearance the abdomen but no evidence of significant ileus or obstruction.  2. Distended bladder, with asymmetric thickening of the bladder dome, at least potentially a sessile mass.  3. No evidence of acute inflammatory process or other clearly acute intra-abdominal or intrapelvic disease.  4. Pattern of sclerosis of the left sacrum suggesting old healed sacral insufficiency fracture.  DICTATED:   10/15/2021 EDITED/ls :   10/15/2021       CT Head Without Contrast    Result Date: 10/15/2021  EXAMINATION: CT HEAD WO CONTRAST - 10/15/2021  INDICATION: Dementia.  TECHNIQUE: 5 mm unenhanced images through the brain  The radiation dose reduction device was turned on for each scan per the ALARA (As Low as Reasonably Achievable) protocol.  COMPARISON: None  FINDINGS: The calvarium appears intact. The included paranasal sinuses and mastoids appear clear. Soft tissue window images show advanced generalized cerebral atrophy, expected for age. There is no evidence of " hemorrhage, contusion, edema, mass or mass effect, hydrocephalus, or abnormal extra-axial collection.      Impression: Age appropriate generalized cerebral atrophy. No evidence of acute intracranial disease is seen.  DICTATED:   10/15/2021 EDITED/ls :   10/15/2021      CT Chest Without Contrast Diagnostic    Result Date: 10/15/2021  EXAMINATION: CT CHEST WO CONTRAST DIAGNOSTIC, CT ABDOMEN/ PELVIS WO CONTRAST - 10/15/2021  INDICATION: Generalized weakness, worsening.  TECHNIQUE: 5 mm unenhanced images through the chest, abdomen and pelvis.  The radiation dose reduction device was turned on for each scan per the ALARA (As Low as Reasonably Achievable) protocol.  COMPARISON: None  FINDINGS: History indicates worsening generalized weakness.  CT SCAN OF THE CHEST WITHOUT CONTRAST: No significant mediastinal hilar or axillary adenopathy is appreciated. There is no evidence of pericardial or pleural effusion. Lung window images show densely calcified 13 mm right lower lobe nodule, and mild linear scarring in both lung bases. No evidence of pneumonia is identified. Note is made of previous lower thoracic kyphoplasties.      Impression: No evidence of active chest disease.  ABDOMEN AND PELVIS CT SCAN WITHOUT CONTRAST: No significant abnormalities are seen of the liver, spleen, pancreas, adrenal glands, or kidneys for a non-IV contrast enhanced exam. Bowel loops are not significantly distended, although there is slightly increased small bowel gas. No bowel wall edema or pneumatosis is seen. Bladder is distended, and appears slightly thick-walled fluid for the degree of distention present, in particular, the anterior wall of the bladder appears thickened, almost masslike as seen on sagittal image #68 series 900. Maximal oblique diameter of the bladder is 13 mm. Uterus and ovaries are not identified, either absent or atrophic. No intrapelvic free fluid or inflammatory change is seen. Bony structures appear to be intact.  "Sclerotic changes of the left sacrum suggest an old healed insufficiency fracture here.  IMPRESSION: 1. Slightly \"gassy\" appearance the abdomen but no evidence of significant ileus or obstruction.  2. Distended bladder, with asymmetric thickening of the bladder dome, at least potentially a sessile mass.  3. No evidence of acute inflammatory process or other clearly acute intra-abdominal or intrapelvic disease.  4. Pattern of sclerosis of the left sacrum suggesting old healed sacral insufficiency fracture.  DICTATED:   10/15/2021 EDITED/ls :   10/15/2021             Assessment/Plan   Assessment & Plan       Acute cystitis without hematuria    Dementia (HCC)    Hypothyroidism    History of breast cancer    Essential hypertension    Chronic back pain      89 year old female presents to the ED via EMS due to increased confusion and diarrhea noted by nursing staff today at [facility].     1) Acute cystitis without hematuria       Altered mental status       ? Bladder mass   -likely contributing to increased confusion   -CT of head negative  -UA as noted above  -CT of abdomen and pelvis was concerning for ? bladder mass, will ask urology to see in am   -continue rocephin   -urine culture pending     2) Acute Diarrhea  -C-Diff PCR pending   -check stool culture  -keep in spore precautions   -strict I &O  -IVF     3) Atrial fibrillation, new   -family reports no prior history   -initially upon arrival , now 90's  -family reports previously on atenolol but PCP discontinued, unclear as to why. Family does not want aggressive measures to treat afib.  Daughter in law reports she will follow up with PCP  -ok with echo in am   -[id number] score; 4  -telemetry monitoring     4) Dementia  -previously on Aricept, unclear if currently on.  Daughter in law reports only med is levothyroxine     5) Hypothyroidism  -check TSH  -continue synthroid     6) History of breast cancer  -s/p bilateral mastectomy with " reconstruction 1989  -recently evaluated at [facility] due to left breast fluid collection  -per Daughter in law: was told they could drain but advised not to due to increased risk and associated age.      7) Chronic back pain   -s/p kyphoplasty at [facility] 9/2021      DVT prophylaxis:  Scds, heparin     CODE STATUS:  DNR/DNI        This note has been completed as part of a split-shared workflow.     Signature: Electronically signed by CAROLINA Sanchez, 10/15/21, 7:43 PM EDT.          Attending   Admission Attestation       I have seen and examined the patient, performing an independent face-to-face diagnostic evaluation with plan of care reviewed and developed with the advanced practice clinician (APC).      Brief Summary Statement:   Mary Ann Cooper is a 89 y.o. female with history of breast cancer, dementia, DJD s/p recent kyphoplasty-- (gets most of care at [facility]) who presents to the ED today due to confusion/diarrhea as reported by her facility ([facility]). APC was able to speak with daughter in law via phone, no family present during my visit and patient unable to participate in questions.     Upon arrival to the ED patient was noted to be in Atrial fibrillation RVR with HR of 121. This appears per family to be a ?new diagnosis  Currently she continues to be in afib but is rate controlled.  Workup in the ED reveals UA consistent with UTI.  CT of abdomen and pelvis is concerning for ? Sessile mass due to asymmetric thickening of the bladder dome.  Patient will be admitted to [facility] under the care of the Hospitalist for further evaluation and treatment.     Remainder of detailed HPI is as noted by APC and has been reviewed and/or edited by me for completeness.    Attending Physical Exam:  GEN- lying in the fetal position, cachetic, resting , chronically ill   HEENT- atraumatic, normocephalic, eomi, dry mucosa  NECK- supple, trachea midline, no masses  RESP: ctab, normal effort  CV: no murmurs,in afib, irregular   MSK: no  edema noted, spontaneous movement of all extremities  NEURO: does not answer questions and not oriented  SKIN: no rashes      Brief Assessment/Plan :  See detailed assessment and plan developed with APC which I have reviewed and/or edited for completeness.        Admission Status: I believe that this patient meets INPATIENT status due to need for IV abx, further w/u of above, new onset afib  I feel patient’s risk for adverse outcomes and need for care warrant INPATIENT evaluation and I predict the patient’s care encounter to likely last beyond 2 midnights.        Shreya Asencio MD  10/15/21

## 2021-10-16 NOTE — PROGRESS NOTES
[facility] Medicine Services  PROGRESS NOTE    Patient Name: Mary Ann Cooper  : 3/31/1930  MRN: [id number]    Date of Admission: 10/15/2021  Primary Care Physician: Christi Gold MD    Subjective   Subjective     CC:  Follow-up confusion    HPI:  No family at bedside this morning, patient unable to meaningfully contribute to her history.  Per nursing she has not had any diarrhea since arrival to the floor.  No events documented overnight    ROS:  Unable to be obtained due to patient's mental status    Objective   Objective     Vital Signs:   Temp:  [96.2 °F (35.7 °C)-98.6 °F (37 °C)] 96.4 °F (35.8 °C)  Heart Rate:  [] 116  Resp:  [16-18] 18  BP: (119-151)/(74-94) 151/94     Physical Exam:  Constitutional: Frail-appearing elderly female in the fetal position mumbling incoherently  HENT: NCAT, mucous membranes moist  Respiratory: Clear to auscultation bilaterally, respiratory effort normal   Cardiovascular: RRR, probable radial pulses  Gastrointestinal: Positive bowel sounds, soft, nondistended  Musculoskeletal: Cachexia  Psychiatric: Mumbling only  Neurologic: She is in the fetal position but appears to be moving all extremities spontaneously    Results Reviewed:  LAB RESULTS:      Lab 10/16/21  0255 10/15/21  1543   WBC 7.39 6.57   HEMOGLOBIN 15.2 15.6   HEMATOCRIT 44.2 45.3   PLATELETS 296 305   NEUTROS ABS 4.26 3.88   IMMATURE GRANS (ABS) 0.02 0.02   LYMPHS ABS 1.83 1.55   MONOS ABS 0.83 0.72   EOS ABS 0.39 0.34   MCV 88.9 89.5         Lab 10/16/21  0255 10/15/21  1543   SODIUM 143 141   POTASSIUM 3.8 3.2*   CHLORIDE 105 103   CO2 28.0 29.0   ANION GAP 10.0 9.0   BUN 20 22   CREATININE 0.79 0.93   GLUCOSE 90 135*   CALCIUM 9.4 9.4   MAGNESIUM 2.3  --    TSH 7.370*  --          Lab 10/15/21  1543   TOTAL PROTEIN 6.6   ALBUMIN 3.70   GLOBULIN 2.9   ALT (SGPT) 21   AST (SGOT) 30   BILIRUBIN 0.5   ALK PHOS 258*   LIPASE 69*         Lab 10/15/21  1543   PROBNP 989.9   TROPONIN T  <0.010                 Brief Urine Lab Results  (Last result in the past 365 days)      Color   Clarity   Blood   Leuk Est   Nitrite   Protein   CREAT   Urine HCG        10/15/21 1544 Yellow   Clear   Trace   Trace   Positive   100 mg/dL (2+)                 Microbiology Results Abnormal     Procedure Component Value - Date/Time    COVID PRE-OP / PRE-PROCEDURE SCREENING ORDER (NO ISOLATION) - Swab, Nasopharynx [[id number]]  (Normal) Collected: 10/15/21 1737    Lab Status: Final result Specimen: Swab from Nasopharynx Updated: 10/15/21 1807    Narrative:      The following orders were created for panel order COVID PRE-OP / PRE-PROCEDURE SCREENING ORDER (NO ISOLATION) - Swab, Nasopharynx.  Procedure                               Abnormality         Status                     ---------                               -----------         ------                     COVID-19 and FLU A/B PCR...[[id number]]  Normal              Final result                 Please view results for these tests on the individual orders.    COVID-19 and FLU A/B PCR - Swab, Nasopharynx [[id number]]  (Normal) Collected: 10/15/21 1737    Lab Status: Final result Specimen: Swab from Nasopharynx Updated: 10/15/21 1807     COVID19 Not Detected     Influenza A PCR Not Detected     Influenza B PCR Not Detected    Narrative:      Fact sheet for providers: https://www.fda.gov/media/179353/download    Fact sheet for patients: https://www.fda.gov/media/750947/download    Test performed by PCR.          CT Abdomen Pelvis Without Contrast    Result Date: 10/15/2021  EXAMINATION: CT CHEST WO CONTRAST DIAGNOSTIC, CT ABDOMEN/ PELVIS WO CONTRAST - 10/15/2021  INDICATION: Generalized weakness, worsening.  TECHNIQUE: 5 mm unenhanced images through the chest, abdomen and pelvis.  The radiation dose reduction device was turned on for each scan per the ALARA (As Low as Reasonably Achievable) protocol.  COMPARISON: None  FINDINGS: History indicates worsening generalized  "weakness.  CT SCAN OF THE CHEST WITHOUT CONTRAST: No significant mediastinal hilar or axillary adenopathy is appreciated. There is no evidence of pericardial or pleural effusion. Lung window images show densely calcified 13 mm right lower lobe nodule, and mild linear scarring in both lung bases. No evidence of pneumonia is identified. Note is made of previous lower thoracic kyphoplasties.      Impression: No evidence of active chest disease.  ABDOMEN AND PELVIS CT SCAN WITHOUT CONTRAST: No significant abnormalities are seen of the liver, spleen, pancreas, adrenal glands, or kidneys for a non-IV contrast enhanced exam. Bowel loops are not significantly distended, although there is slightly increased small bowel gas. No bowel wall edema or pneumatosis is seen. Bladder is distended, and appears slightly thick-walled fluid for the degree of distention present, in particular, the anterior wall of the bladder appears thickened, almost masslike as seen on sagittal image #68 series 900. Maximal oblique diameter of the bladder is 13 mm. Uterus and ovaries are not identified, either absent or atrophic. No intrapelvic free fluid or inflammatory change is seen. Bony structures appear to be intact. Sclerotic changes of the left sacrum suggest an old healed insufficiency fracture here.  IMPRESSION: 1. Slightly \"gassy\" appearance the abdomen but no evidence of significant ileus or obstruction.  2. Distended bladder, with asymmetric thickening of the bladder dome, at least potentially a sessile mass.  3. No evidence of acute inflammatory process or other clearly acute intra-abdominal or intrapelvic disease.  4. Pattern of sclerosis of the left sacrum suggesting old healed sacral insufficiency fracture.  DICTATED:   10/15/2021 EDITED/ls :   10/15/2021       CT Head Without Contrast    Result Date: 10/15/2021  EXAMINATION: CT HEAD WO CONTRAST - 10/15/2021  INDICATION: Dementia.  TECHNIQUE: 5 mm unenhanced images through the brain  " The radiation dose reduction device was turned on for each scan per the ALARA (As Low as Reasonably Achievable) protocol.  COMPARISON: None  FINDINGS: The calvarium appears intact. The included paranasal sinuses and mastoids appear clear. Soft tissue window images show advanced generalized cerebral atrophy, expected for age. There is no evidence of hemorrhage, contusion, edema, mass or mass effect, hydrocephalus, or abnormal extra-axial collection.      Impression: Age appropriate generalized cerebral atrophy. No evidence of acute intracranial disease is seen.  DICTATED:   10/15/2021 EDITED/ls :   10/15/2021      CT Chest Without Contrast Diagnostic    Result Date: 10/15/2021  EXAMINATION: CT CHEST WO CONTRAST DIAGNOSTIC, CT ABDOMEN/ PELVIS WO CONTRAST - 10/15/2021  INDICATION: Generalized weakness, worsening.  TECHNIQUE: 5 mm unenhanced images through the chest, abdomen and pelvis.  The radiation dose reduction device was turned on for each scan per the ALARA (As Low as Reasonably Achievable) protocol.  COMPARISON: None  FINDINGS: History indicates worsening generalized weakness.  CT SCAN OF THE CHEST WITHOUT CONTRAST: No significant mediastinal hilar or axillary adenopathy is appreciated. There is no evidence of pericardial or pleural effusion. Lung window images show densely calcified 13 mm right lower lobe nodule, and mild linear scarring in both lung bases. No evidence of pneumonia is identified. Note is made of previous lower thoracic kyphoplasties.      Impression: No evidence of active chest disease.  ABDOMEN AND PELVIS CT SCAN WITHOUT CONTRAST: No significant abnormalities are seen of the liver, spleen, pancreas, adrenal glands, or kidneys for a non-IV contrast enhanced exam. Bowel loops are not significantly distended, although there is slightly increased small bowel gas. No bowel wall edema or pneumatosis is seen. Bladder is distended, and appears slightly thick-walled fluid for the degree of distention  "present, in particular, the anterior wall of the bladder appears thickened, almost masslike as seen on sagittal image #68 series 900. Maximal oblique diameter of the bladder is 13 mm. Uterus and ovaries are not identified, either absent or atrophic. No intrapelvic free fluid or inflammatory change is seen. Bony structures appear to be intact. Sclerotic changes of the left sacrum suggest an old healed insufficiency fracture here.  IMPRESSION: 1. Slightly \"gassy\" appearance the abdomen but no evidence of significant ileus or obstruction.  2. Distended bladder, with asymmetric thickening of the bladder dome, at least potentially a sessile mass.  3. No evidence of acute inflammatory process or other clearly acute intra-abdominal or intrapelvic disease.  4. Pattern of sclerosis of the left sacrum suggesting old healed sacral insufficiency fracture.  DICTATED:   10/15/2021 EDITED/ls :   10/15/2021             I have reviewed the medications:  Scheduled Meds:cefTRIAXone, 1 g, Intravenous, Q24H  heparin (porcine), 5,000 Units, Subcutaneous, Q12H  levothyroxine, 75 mcg, Oral, Q AM      Continuous Infusions:lactated ringers, 75 mL/hr, Last Rate: 75 mL/hr (10/15/21 2242)      PRN Meds:.•  acetaminophen **OR** acetaminophen **OR** acetaminophen  •  potassium chloride **OR** potassium chloride **OR** potassium chloride  •  sodium chloride  •  [COMPLETED] Insert peripheral IV **AND** sodium chloride    Assessment/Plan   Assessment & Plan     Active Hospital Problems    Diagnosis  POA   • **Acute cystitis without hematuria [N30.00]  Yes   • Dementia (HCC) [F03.90]  Yes   • Hypothyroidism [E03.9]  Yes   • History of breast cancer [Z85.3]  Not Applicable   • Essential hypertension [I10]  Yes   • Chronic back pain [M54.9, G89.29]  Yes   • Atrial fibrillation (HCC) [I48.91]  Unknown      Resolved Hospital Problems   No resolved problems to display.        Brief Hospital Course to date:  Mary Ann Cooper is a 89 y.o. female nursing home " resident with Hx of breast cancer, KARTHIKEYAN sosa, dementia recently undergoing kyphoplasty 1 month ago at [facility] who has brought to the hospital for evaluation of diarrhea and confusion above baseline    The following problems new to me today    Assessment/plan    Presumed UTI  -Patient unable to describe any symptoms at this time, UA c/w UTI  -Continue to follow urine cultures  -Continue CTX empiric antibiotics    Asymmetric bladder wall thickening  -Unclear clinical significance at this time in the setting of presumed acute cystitis; continue antibiotics  -Urology consulted by admitting team    Acute metabolic encephalopathy  -Treatment as above, supportive care, monitor clinically    Acute diarrhea  -Stool studies ordered on admission; patient has not had a bowel movement since arriving to the floor    Presumed new onset atrial fibrillation  -No prior Hx of the same per family  -Per H&P no invasive investigation/intervention  -No anticoagulation per family request  -Start low-dose metoprolol for rate control    Dementia  -Not on any meds at home    Hypothyroidism  -Continue Synthroid    Hx of breast cancer, remote  -S/p BL mastectomy with reconstruction in 1989  -Per documentation recently had LT breast fluid collection evaluated at [facility] that could not be drained    Chronic back pain  -S/p kyphoplasty at [facility] 9/2021      DVT prophylaxis:  Medical and mechanical DVT prophylaxis orders are present.       AM-PAC 6 Clicks Score (PT): 8 (10/15/21 2230)    Disposition: I expect the patient to be discharged TBD, and general family does not want invasive/aggressive interventions, if she does not improve with the above care further goals of care discussions may need to be had.    CODE STATUS:   Code Status and Medical Interventions:   Ordered at: 10/15/21 1946     Limited Support to NOT Include:    Intubation     Level Of Support Discussed With:    Health Care Surrogate     Code Status:    No CPR      Medical Interventions (Level of Support Prior to Arrest):    Limited       Terry Sumner, DO  10/16/21

## 2021-10-16 NOTE — THERAPY EVALUATION
Patient Name: Mary Ann Cooper  : 3/31/1930    MRN: [id number]                              Today's Date: 10/16/2021       Admit Date: 10/15/2021    Visit Dx:     ICD-10-CM ICD-9-CM   1. Acute cystitis without hematuria  N30.00 595.0   2. Bladder mass  N32.89 596.89   3. Confusion  R41.0 298.9   4. Dementia without behavioral disturbance, unspecified dementia type (HCC)  F03.90 294.20   5. Atrial fibrillation, unspecified type (HCC)  I48.91 427.31     Patient Active Problem List   Diagnosis   • Acute cystitis without hematuria   • Dementia (HCC)   • Hypothyroidism   • History of breast cancer   • Essential hypertension   • Chronic back pain   • Atrial fibrillation (HCC)     Past Medical History:   Diagnosis Date   • Cancer (HCC)    • Disease of thyroid gland    • Hypertension      Past Surgical History:   Procedure Laterality Date   • BREAST SURGERY     • HYSTERECTOMY        General Information     Row Name 10/16/21 1131          Physical Therapy Time and Intention    Document Type evaluation  -KM     Mode of Treatment physical therapy  -KM     Row Name 10/16/21 1131          General Information    Patient Profile Reviewed yes  -KM     Prior Level of Function independent:; all household mobility; min assist:; ADL's  RN spoke with care facility who report patient ambulates ad shaista w/o assistive device, memory care unit  -KM     Existing Precautions/Restrictions fall; other (see comments)  spore  -KM     Barriers to Rehab cognitive status  -KM     Row Name 10/16/21 1131          Living Environment    Lives With facility resident  -KM     Row Name 10/16/21 1131          Home Main Entrance    Number of Stairs, Main Entrance none  -KM     Row Name 10/16/21 1131          Stairs Within Home, Primary    Number of Stairs, Within Home, Primary none  -KM     Row Name 10/16/21 1131          Cognition    Orientation Status (Cognition) oriented to; person; unable/difficult to assess  patient difficult to arouse, stated name, sipped  from straw  -KM     Row Name 10/16/21 1131          Safety Issues, Functional Mobility    Safety Issues Affecting Function (Mobility) ability to follow commands; friction/shear risk; safety precaution awareness; safety precautions follow-through/compliance  -KM     Impairments Affecting Function (Mobility) cognition  -KM     Cognitive Impairments, Mobility Safety/Performance attention  patient lethargic and somewhat arousable, pt presents in sidelying R in compact fetal position  -KM           User Key  (r) = Recorded By, (t) = Taken By, (c) = Cosigned By    Initials Name Provider Type    Subha Hauser, PT Physical Therapist               Mobility     Row Name 10/16/21 1135          Bed Mobility    Bed Mobility rolling left; rolling right  -KM     Rolling Left [place] (Bed Mobility) maximum assist (25% patient effort)  -KM     Rolling Right [place] (Bed Mobility) maximum assist (25% patient effort)  -KM     Comment (Bed Mobility) hindered by AMS  -KM     Row Name 10/16/21 1135          Bed-Chair Transfer    Bed-Chair [place] (Transfers) unable to assess  -KM     Row Name 10/16/21 1135          Sit-Stand Transfer    Sit-Stand [place] (Transfers) unable to assess  -KM     [facility] Name 10/16/21 1135          Gait/Stairs (Locomotion)    [place] Level (Gait) unable to assess  -KM           User Key  (r) = Recorded By, (t) = Taken By, (c) = Cosigned By    Initials Name Provider Type    Subha Hauser, PT Physical Therapist               Obj/Interventions     Row Name 10/16/21 1136          Range of Motion Comprehensive    Comment, General Range of Motion patient postioned in maximum hip flexion in sidelying, resisting extension of hip, B ankles wfls, bilateral knee extension -10, full flexion  -     Row Name 10/16/21 1136          Strength Comprehensive (MMT)    Comment, General Manual Muscle Testing (MMT) Assessment unable to MMT  -     Row Name 10/16/21 1136           Balance    Comment, Balance unable to assess due to AMS  -KM     Row Name 10/16/21 1136          Sensory Assessment (Somatosensory)    Sensory Assessment (Somatosensory) unable/difficult to assess  -KM           User Key  (r) = Recorded By, (t) = Taken By, (c) = Cosigned By    Initials Name Provider Type    Subha Hauser, PT Physical Therapist               Goals/Plan     Row Name 10/16/21 1142          Bed Mobility Goal 1 (PT)    Activity/Assistive Device (Bed Mobility Goal 1, PT) bed mobility activities, all  -KM     [place] Level/Cues Needed (Bed Mobility Goal 1, PT) minimum assist (75% or more patient effort)  -KM     Time Frame (Bed Mobility Goal 1, PT) 10 days  -KM     Row Name 10/16/21 1142          Transfer Goal 1 (PT)    Activity/Assistive Device (Transfer Goal 1, PT) sit-to-stand/stand-to-sit; bed-to-chair/chair-to-bed; walker, rolling  -KM     [place] Level/Cues Needed (Transfer Goal 1, PT) minimum assist (75% or more patient effort)  -KM     Time Frame (Transfer Goal 1, PT) 10 days  -KM     Row Name 10/16/21 1142          Gait Training Goal 1 (PT)    Activity/Assistive Device (Gait Training Goal 1, PT) gait (walking locomotion); walker, rolling  -KM     [place] Level (Gait Training Goal 1, PT) minimum assist (75% or more patient effort)  -KM     Distance (Gait Training Goal 1, PT) 200  -KM     Time Frame (Gait Training Goal 1, PT) 10 days  -KM           User Key  (r) = Recorded By, (t) = Taken By, (c) = Cosigned By    Initials Name Provider Type    Subha Hauser, PT Physical Therapist               Clinical Impression     Row Name 10/16/21 1138          Pain    Additional Documentation Pain Scale: FACES Pre/Post-Treatment (Group)  -     Row Name 10/16/21 1138          Pain Scale: FACES Pre/Post-Treatment    Pain: FACES Scale, Pretreatment 0-->no hurt  -     Posttreatment Pain Rating 0-->no hurt  -     Row Name 10/16/21 1138          Plan of Care Review    Plan  of Care Reviewed With patient  -KM     Outcome Summary PT consult intiated. Patient received verbal and tactile stimulation, pt resisting L/E rom. Patient to benefit from skilled svcs to begin functional mobility once mental status improves to participate. Anticipate return to care facility, likely will require continue therapy svcs  -KM     Row Name 10/16/21 1138          Therapy Assessment/Plan (PT)    Patient/Family Therapy Goals Statement (PT) regain PLOF  -KM     Rehab Potential (PT) fair, will monitor progress closely  -KM     Criteria for Skilled Interventions Met (PT) yes  -KM     Row Name 10/16/21 1138          Positioning and Restraints    Pre-Treatment Position in bed  -KM     Post Treatment Position bed  -KM     In Bed side lying right; call light within reach; encouraged to call for assist; exit alarm on  pt in fetal position  -KM           User Key  (r) = Recorded By, (t) = Taken By, (c) = Cosigned By    Initials Name Provider Type    Subha Hauser, PT Physical Therapist               Outcome Measures     Row Name 10/16/21 1142 10/16/21 0800       How much help from another person do you currently need...    Turning from your back to your side while in flat bed without using bedrails? 2  -KM 3  -LC    Moving from lying on back to sitting on the side of a flat bed without bedrails? 2  -KM 3  -LC    Moving to and from a bed to a chair (including a wheelchair)? 1  -KM 1  -LC    Standing up from a chair using your arms (e.g., wheelchair, bedside chair)? 1  -KM 1  -LC    Climbing 3-5 steps with a railing? 1  -KM 1  -LC    To walk in hospital room? 1  -KM 1  -LC    AM-PAC 6 Clicks Score (PT) 8  -KM 10  -LC    Row Name 10/16/21 1142          Functional Assessment    Outcome Measure Options AM-PAC 6 Clicks Basic Mobility (PT)  -KM           User Key  (r) = Recorded By, (t) = Taken By, (c) = Cosigned By    Initials Name Provider Type    Subha Hauser, PT Physical Therapist    JUSTO  Sanaz Easton, RN Registered Nurse                             Physical Therapy Education                 Title: PT OT SLP Therapies (In Progress)     Topic: Physical Therapy (In Progress)     Point: Mobility training (In Progress)     Learning Progress Summary           Patient Nonacceptance, E, NR by KM at 10/16/2021 1143    Comment: pt with AMS, difficult to assess                   Point: Home exercise program (In Progress)     Learning Progress Summary           Patient Nonacceptance, E, NR by KM at 10/16/2021 1143    Comment: pt with AMS, difficult to assess                   Point: Body mechanics (In Progress)     Learning Progress Summary           Patient Nonacceptance, E, NR by KM at 10/16/2021 1143    Comment: pt with AMS, difficult to assess                   Point: Precautions (In Progress)     Learning Progress Summary           Patient Nonacceptance, E, NR by KM at 10/16/2021 1143    Comment: pt with AMS, difficult to assess                               User Key     Initials Effective Dates Name Provider Type Discipline     06/16/21 -  Subha Patton, PT Physical Therapist PT              PT Recommendation and Plan  Planned Therapy Interventions (PT): balance training, bed mobility training, gait training, transfer training  Plan of Care Reviewed With: patient  Outcome Summary: PT consult intiated. Patient received verbal and tactile stimulation, pt resisting L/E rom. Patient to benefit from skilled svcs to begin functional mobility once mental status improves to participate. Anticipate return to care facility, likely will require continue therapy svcs     Time Calculation:    PT Charges     Row Name 10/16/21 1144             Time Calculation    Start Time 1044  -KM      PT Received On 10/16/21  -KM      PT Goal Re-Cert Due Date 10/26/21  -KM              Untimed Charges    PT Eval/Re-eval Minutes 32  -KM              Total Minutes    Untimed Charges Total Minutes 32  -KM       Total  Minutes 32  -KM            User Key  (r) = Recorded By, (t) = Taken By, (c) = Cosigned By    Initials Name Provider Type     Subha Patton, PT Physical Therapist              Therapy Charges for Today     Code Description Service Date Service Provider Modifiers Qty    [id number] HC PT EVAL LOW COMPLEXITY 3 10/16/2021 Subha Patton, PT GP 1          PT G-Codes  Outcome Measure Options: AM-PAC 6 Clicks Basic Mobility (PT)  AM-PAC 6 Clicks Score (PT): 8    Subha Patton, PT  10/16/2021

## 2021-10-16 NOTE — CONSULTS
[facility]   HISTORY AND PHYSICAL    Patient Name: Mary Ann Cooper  : 3/31/1930  MRN: [id number]  Primary Care Physician:  Christi Gold MD  Date of admission: 10/15/2021    Subjective   Subjective     Chief Complaint: Confusion    HPI:    Mary Ann Cooper is a 89 y.o. female with a past medical history significant for breast cancer, hypothyroidism, chronic back pain and disc disease, atrial fibrillation..  She was brought to [facility] ED via EMS after reported confusion and diarrhea by her nursing staff at [facility].  Per report the patient has underlying dementia.  The patient is unaccompanied at time of evaluation and due to significant AMS is unable to provide any medical history.  Per chart review she has had recent kyphoplasty in 2021 due to disc disease.  A work-up was performed by the ED with a UA obtained concerning for possible urinary tract infection.  A CT scan of the abdomen and pelvis was obtained which demonstrates distended bladder and thickened urinary bladder.  There was report of a possible area of mass within the bladder.  Patient is currently admitted with urine culture pending, she is receiving IV ceftriaxone.  White count is 7.3.  Creatinine is 0.79.  No additional medical history is able to be obtained.    Review of Systems   Review of systems could not be obtained due to   patient confusion.    Personal History     Past Medical History:   Diagnosis Date   • Cancer (HCC)    • Disease of thyroid gland    • Hypertension        Past Surgical History:   Procedure Laterality Date   • BREAST SURGERY     • HYSTERECTOMY         Family History: family history is not on file. Otherwise pertinent FHx was reviewed and not pertinent to current issue.    Social History:  reports that she has never smoked. She has never used smokeless tobacco. She reports that she does not drink alcohol and does not use drugs.    Home Medications:         Allergies:  Allergies   Allergen Reactions    • Codeine Unknown - High Severity   • Levothyroxine Unknown - Low Severity   • Penicillins Unknown - High Severity       Objective   Objective     Vitals:   Temp:  [96.2 °F (35.7 °C)-98.6 °F (37 °C)] 96.4 °F (35.8 °C)  Heart Rate:  [] 112  Resp:  [16-18] 18  BP: (119-151)/(74-94) 151/94  Physical Exam    Constitutional: Awake in bed, alert    HENT: Normocephalic, atraumatic, mucous membranes moist   Neck: Supple, trachea midline   Respiratory:Equal chest rise, non-labored respirations    Cardiovascular: Well-perfused, no significant edema   Gastrointestinal: Soft, nontender, non-distended   Musculoskeletal: No bilateral ankle edema, no clubbing or cyanosis to extremities   Psychiatric: AMS, not oriented   Neurologic: AMS, not oriented   Skin: No rashes     Result Review    Result Review:  I have personally reviewed the results from the time of this admission to 10/16/2021 11:16 EDT and agree with these findings:  [x]  Laboratory  [x]  Microbiology  [x]  Radiology  []  EKG/Telemetry   []  Cardiology/Vascular   []  Pathology  [x]  Old records  []  Other:    Most notable findings include:     I personally viewed the patient's CT abdomen and pelvis which demonstrates distended urinary bladder with thickened bladder wall.  There is air in the bladder but not in the bladder wall.  At the level of the dome there is bladder wall thickening but it is difficult to interpret if there is an obvious mass.  No hydronephrosis, nephrolithiasis, renal lesions.    Assessment/Plan   Assessment / Plan     Brief Patient Summary:  Mary Ann Cooper is a 89 y.o. female who is admitted after episode of AMS and diarrhea at her nursing facility of [facility].  The patient has a significant past medical history with multiple comorbidities.  Patient is altered at time of evaluation and unable to provide medical history, as well as being unaccompanied.  UA obtained at time of admission concerning for infection and she is being  treated with IV antibiotics.  A urine culture is pending.  A CT scan was obtained which demonstrated distended urinary bladder as well as thickening of the urinary bladder.  There was report of a questionable bladder mass but this is very difficult to interpret on CT imaging.  To fully evaluate the urinary bladder a formal cystoscopy would be required.  This bladder wall thickening could also be consistent with infection.  The patient is not having reported hematuria.  There is air within the urinary bladder.    Active Hospital Problems:  Active Hospital Problems    Diagnosis    • Acute cystitis without hematuria    • Dementia (HCC)    • Hypothyroidism    • History of breast cancer    • Essential hypertension    • Chronic back pain    • Atrial fibrillation (HCC)      Plan:   -Continue to follow-up urine culture results and treat with culture specific antibiotic course.  -Could consider Gallego catheter placement due to distended urinary bladder at time of imaging although patient has had multiple voids with unrecorded volume.  -Could consider outpatient cystoscopy to further evaluate the bladder.  The patient does have multiple medical comorbidities.  There is no ability to identify bladder mass with CT would require cystoscopy.  Would not perform at the time of active infection.  This would need to be a discussion with the patient's family based upon her current medical status and comorbidity.  -We will continue to follow while in hospital and pending improvement can discuss with family possible desire for outpatient follow-up and cystoscopy.    DVT prophylaxis:  Medical and mechanical DVT prophylaxis orders are present.    CODE STATUS:    Limited Support to NOT Include: Intubation  Level Of Support Discussed With: Health Care Surrogate  Code Status: No CPR  Medical Interventions (Level of Support Prior to Arrest): Limited    Admission Status: Per primary team.    Electronically signed by Shashank White MD, 10/16/21,  11:16 AM EDT.

## 2021-10-16 NOTE — PLAN OF CARE
Goal Outcome Evaluation:  Plan of Care Reviewed With: patient        Progress: improving  Outcome Summary: Patient has been restless this afternoon, Tylenol given. Skin care continued after urinencontenence. No bowel movements today.

## 2021-10-16 NOTE — PLAN OF CARE
Goal Outcome Evaluation:  Plan of Care Reviewed With: patient        Progress: no change  Outcome Summary: VSS on room air, afib and tachy but rate controlled on monitor. Pt confused but history of advanced dementia. Q2 turned. Anxious and tried to pinch and hit at times, ativan given. Will continue plan of care. 0550 10/16/2021

## 2021-10-16 NOTE — PLAN OF CARE
Goal Outcome Evaluation:  Plan of Care Reviewed With: patient           Outcome Summary: PT consult intiated. Patient received verbal and tactile stimulation, pt resisting L/E rom. Patient to benefit from skilled svcs to begin functional mobility once mental status improves to participate. Anticipate return to care facility, likely will require continue therapy svcs

## 2021-10-16 NOTE — PROGRESS NOTES
"Clinical Nutrition   Reason For Visit: Identified at risk by screening criteria    Patient Name: Mary Ann Cooper  YOB: 1930  MRN: [id number]  Date of Encounter: 10/16/21 16:47 EDT  Admission date: 10/15/2021            Nutrition Assessment     Admission Problem List:    Acute cystitis without hematuria    AMS/Dementia (HCC)    Hypothyroidism    History of breast cancer    Essential hypertension    Chronic back pain    Atrial fibrillation (HCC)         Applicable PMH:        Applicable Nutrition-Related Information:        Applicable medical tests/procedures since admission:        PMH: She  has a past medical history of Cancer (HCC), Disease of thyroid gland, and Hypertension.   PSxH: She  has a past surgical history that includes Breast surgery and Hysterectomy.        Reported/Observed/Food/Nutrition Related History     RD spoke with pt's dtr Isadora Cooper on phone (as listed in EMR emergency contacts) who states pt has been eating less than usual and had unintended wt loss (does not know specifics of po intake nor wt data) since moving to [facility] in July 2021.  Dtr states pt has \"always been thin.\" Dtr reports pt was eating well until July 2021 and states during the past month is the biggest change in po intake with pt eating less than 1/2 of usual po intake.    Dtr also states pt has had some recent vomiting, and dtr wonders if pt has forgotten how to swallow. (?)  RD attempted to call [facility] in an effort to obtain ht/wt and po intake hx data without success/was not able to get anyone to answer my call.     Anthropometrics   Height: dtr estimates 69in ?; need to obtain ht this adm or get data from living facility as possible.  Weight: 92lb per bed wt on 10/15  BMI: will confirm ht before assessing.  BMI classification:   IBW:     UBW:   Weight change:         Nutrition Focused Physical Exam         Labs reviewed   Labs reviewed: Yes; cdiff labs pending    Results from " last 7 days   Lab Units 10/16/21  1526 10/16/21  0255 10/15/21  1543   SODIUM mmol/L  --  143 141   POTASSIUM mmol/L 4.2 3.8 3.2*   CHLORIDE mmol/L  --  105 103   CO2 mmol/L  --  28.0 29.0   BUN mg/dL  --  20 22   CREATININE mg/dL  --  0.79 0.93   GLUCOSE mg/dL  --  90 135*   CALCIUM mg/dL  --  9.4 9.4   MAGNESIUM mg/dL  --  2.3  --      Results from last 7 days   Lab Units 10/16/21  0255 10/15/21  1543   WBC 10*3/mm3 7.39 6.57   ALBUMIN g/dL  --  3.70         No results found for: HGBA1C  Medications reviewed   Medications reviewed: Yes      Needs Assessment   Height used:   Weight used:   IBW:     Estimated Calories needs:       Estimated Protein needs:       Estimated Fluid needs:       Current Nutrition Prescription   PO: Diet Regular; Cardiac  Oral Nutrition Supplement: No active supplement orders           Average PO intake: insuffic data            Nutrition Diagnosis     Problem Predicted suboptimal energy intake   Etiology Poor appetite   Signs/Symptoms dtr reports pt eating less than 1/2 usual po intake during the past month prior to adm.                       Intervention   Intervention: Follow treatment progress, Interview for preferences, Encourage intake, Supplement provided  Will send Boost plus 2x/d.    Goal:   General: Nutrition support treatment  PO: Establish PO   EN/PN:     Additional goals:       Monitoring/Evaluation:   Monitoring/Evaluation: Per protocol   Pt in isolation room to r/o Cdiff; will follow for results as labs avail and assess pt as approp.   Rec consider consulting SLP for swallow tuanal.    Tamika Patel, MS,RD,LD  Time Spent: 40 mins

## 2021-10-17 NOTE — PROGRESS NOTES
[facility]  PROGRESS NOTE    Patient Name: Mary Ann Cooper  : 3/31/1930  MRN: [id number]    Date of Admission: 10/15/2021  Primary Care Physician: Christi Gold MD    Subjective   Subjective     CC:  Follow-up UTI    HPI:  More alert this morning but still unable to interact in a meaningful way.  No family at bedside.  No diarrhea reported.  Patient did not cooperate with attempt at echo last night    ROS:  Unable to be reliably obtained due to patient's mental status/dementia    Objective   Objective     Vital Signs:   Temp:  [96 °F (35.6 °C)-97 °F (36.1 °C)] 96 °F (35.6 °C)  Heart Rate:  [] 81  Resp:  [16-18] 18  BP: (118-147)/(85-99) 118/85     Physical Exam:  Constitutional: Frail appearing elderly female laying backwards in bed, in no acute distress  HENT: NCAT, mucous membranes moist  Respiratory: Clear to auscultation bilaterally, respiratory effort normal   Cardiovascular: RRR, palpable radial pulses  Gastrointestinal: Positive bowel sounds, soft, nondistended  Musculoskeletal: Cachexia, no lower extremity edema  Neurologic: Moving all extremities spontaneously    Results Reviewed:  LAB RESULTS:      Lab 10/16/21  0255 10/15/21  1543   WBC 7.39 6.57   HEMOGLOBIN 15.2 15.6   HEMATOCRIT 44.2 45.3   PLATELETS 296 305   NEUTROS ABS 4.26 3.88   IMMATURE GRANS (ABS) 0.02 0.02   LYMPHS ABS 1.83 1.55   MONOS ABS 0.83 0.72   EOS ABS 0.39 0.34   MCV 88.9 89.5         Lab 10/16/21  1526 10/16/21  0255 10/15/21  1543   SODIUM  --  143 141   POTASSIUM 4.2 3.8 3.2*   CHLORIDE  --  105 103   CO2  --  28.0 29.0   ANION GAP  --  10.0 9.0   BUN  --  20 22   CREATININE  --  0.79 0.93   GLUCOSE  --  90 135*   CALCIUM  --  9.4 9.4   MAGNESIUM  --  2.3  --    TSH  --  7.370*  --          Lab 10/15/21  1543   TOTAL PROTEIN 6.6   ALBUMIN 3.70   GLOBULIN 2.9   ALT (SGPT) 21   AST (SGOT) 30   BILIRUBIN 0.5   ALK PHOS 258*   LIPASE 69*         Lab 10/15/21  1543   PROBNP 989.9    TROPONIN T <0.010                 Brief Urine Lab Results  (Last result in the past 365 days)      Color   Clarity   Blood   Leuk Est   Nitrite   Protein   CREAT   Urine HCG        10/15/21 1544 Yellow   Clear   Trace   Trace   Positive   100 mg/dL (2+)                 Microbiology Results Abnormal     Procedure Component Value - Date/Time    Gastrointestinal Panel, PCR - Stool, Per Rectum [[id number]]  (Normal) Collected: 10/16/21 2027    Lab Status: Final result Specimen: Stool from Per Rectum Updated: 10/16/21 2153     Campylobacter Not Detected     Plesiomonas shigelloides Not Detected     Salmonella Not Detected     Vibrio Not Detected     Vibrio cholerae Not Detected     Yersinia enterocolitica Not Detected     Enteroaggregative E. coli (EAEC) Not Detected     Enteropathogenic E. coli (EPEC) Not Detected     Enterotoxigenic E. coli (ETEC) lt/st Not Detected     Shiga-like toxin-producing E. coli (STEC) stx1/stx2 Not Detected     Shigella/Enteroinvasive E. coli (EIEC) Not Detected     Cryptosporidium Not Detected     Cyclospora cayetanensis Not Detected     Entamoeba histolytica Not Detected     Giardia lamblia Not Detected     Adenovirus F40/41 Not Detected     Astrovirus Not Detected     Norovirus GI/GII Not Detected     Rotavirus A Not Detected     Sapovirus (I, II, IV or V) Not Detected    Clostridium Difficile Toxin - Stool, Per Rectum [[id number]]  (Normal) Collected: 10/16/21 2027    Lab Status: Final result Specimen: Stool from Per Rectum Updated: 10/16/21 2125    Narrative:      The following orders were created for panel order Clostridium Difficile Toxin - Stool, Per Rectum.  Procedure                               Abnormality         Status                     ---------                               -----------         ------                     Clostridium Difficile To...[[id number]]  Normal              Final result                 Please view results for these tests on the individual orders.     Clostridium Difficile Toxin, PCR - Stool, Per Rectum [[id number]]  (Normal) Collected: 10/16/21 2027    Lab Status: Final result Specimen: Stool from Per Rectum Updated: 10/16/21 2125     C. Difficile Toxins by PCR Not Detected    Narrative:      Performance characteristics of test not established for patients <2 years of age.  Negative for Toxigenic C. Difficile    COVID PRE-OP / PRE-PROCEDURE SCREENING ORDER (NO ISOLATION) - Swab, Nasopharynx [[id number]]  (Normal) Collected: 10/15/21 1737    Lab Status: Final result Specimen: Swab from Nasopharynx Updated: 10/15/21 1807    Narrative:      The following orders were created for panel order COVID PRE-OP / PRE-PROCEDURE SCREENING ORDER (NO ISOLATION) - Swab, Nasopharynx.  Procedure                               Abnormality         Status                     ---------                               -----------         ------                     COVID-19 and FLU A/B PCR...[[id number]]  Normal              Final result                 Please view results for these tests on the individual orders.    COVID-19 and FLU A/B PCR - Swab, Nasopharynx [[id number]]  (Normal) Collected: 10/15/21 1737    Lab Status: Final result Specimen: Swab from Nasopharynx Updated: 10/15/21 1807     COVID19 Not Detected     Influenza A PCR Not Detected     Influenza B PCR Not Detected    Narrative:      Fact sheet for providers: https://www.fda.gov/media/014482/download    Fact sheet for patients: https://www.fda.gov/media/431682/download    Test performed by PCR.          CT Abdomen Pelvis Without Contrast    Result Date: 10/16/2021  EXAMINATION: CT CHEST WO CONTRAST DIAGNOSTIC, CT ABDOMEN/ PELVIS WO CONTRAST - 10/15/2021  INDICATION: Generalized weakness, worsening.  TECHNIQUE: 5 mm unenhanced images through the chest, abdomen and pelvis.  The radiation dose reduction device was turned on for each scan per the ALARA (As Low as Reasonably Achievable) protocol.  COMPARISON: None  FINDINGS: History  "indicates worsening generalized weakness.  CT SCAN OF THE CHEST WITHOUT CONTRAST: No significant mediastinal hilar or axillary adenopathy is appreciated. There is no evidence of pericardial or pleural effusion. Lung window images show densely calcified 13 mm right lower lobe nodule, and mild linear scarring in both lung bases. No evidence of pneumonia is identified. Note is made of previous lower thoracic kyphoplasties.      Impression: No evidence of active chest disease.  ABDOMEN AND PELVIS CT SCAN WITHOUT CONTRAST: No significant abnormalities are seen of the liver, spleen, pancreas, adrenal glands, or kidneys for a non-IV contrast enhanced exam. Bowel loops are not significantly distended, although there is slightly increased small bowel gas. No bowel wall edema or pneumatosis is seen. Bladder is distended, and appears slightly thick-walled fluid for the degree of distention present, in particular, the anterior wall of the bladder appears thickened, almost masslike as seen on sagittal image #68 series 900. Maximal oblique diameter of the bladder is 13 mm. Uterus and ovaries are not identified, either absent or atrophic. No intrapelvic free fluid or inflammatory change is seen. Bony structures appear to be intact. Sclerotic changes of the left sacrum suggest an old healed insufficiency fracture here.  IMPRESSION: 1. Slightly \"gassy\" appearance the abdomen but no evidence of significant ileus or obstruction.  2. Distended bladder, with asymmetric thickening of the bladder dome, at least potentially a sessile mass.  3. No evidence of acute inflammatory process or other clearly acute intra-abdominal or intrapelvic disease.  4. Pattern of sclerosis of the left sacrum suggesting old healed sacral insufficiency fracture.  DICTATED:   10/15/2021 EDITED/ls :   10/15/2021   This report was finalized on 10/16/2021 11:18 PM by Dr. Ozzie Velasquez MD.      CT Head Without Contrast    Result Date: 10/16/2021  EXAMINATION: CT HEAD " WO CONTRAST - 10/15/2021  INDICATION: Dementia.  TECHNIQUE: 5 mm unenhanced images through the brain  The radiation dose reduction device was turned on for each scan per the ALARA (As Low as Reasonably Achievable) protocol.  COMPARISON: None  FINDINGS: The calvarium appears intact. The included paranasal sinuses and mastoids appear clear. Soft tissue window images show advanced generalized cerebral atrophy, expected for age. There is no evidence of hemorrhage, contusion, edema, mass or mass effect, hydrocephalus, or abnormal extra-axial collection.      Impression: Age appropriate generalized cerebral atrophy. No evidence of acute intracranial disease is seen.  DICTATED:   10/15/2021 EDITED/ls :   10/15/2021  This report was finalized on 10/16/2021 10:21 PM by Dr. Ozzie Velasquez MD.      CT Chest Without Contrast Diagnostic    Result Date: 10/16/2021  EXAMINATION: CT CHEST WO CONTRAST DIAGNOSTIC, CT ABDOMEN/ PELVIS WO CONTRAST - 10/15/2021  INDICATION: Generalized weakness, worsening.  TECHNIQUE: 5 mm unenhanced images through the chest, abdomen and pelvis.  The radiation dose reduction device was turned on for each scan per the ALARA (As Low as Reasonably Achievable) protocol.  COMPARISON: None  FINDINGS: History indicates worsening generalized weakness.  CT SCAN OF THE CHEST WITHOUT CONTRAST: No significant mediastinal hilar or axillary adenopathy is appreciated. There is no evidence of pericardial or pleural effusion. Lung window images show densely calcified 13 mm right lower lobe nodule, and mild linear scarring in both lung bases. No evidence of pneumonia is identified. Note is made of previous lower thoracic kyphoplasties.      Impression: No evidence of active chest disease.  ABDOMEN AND PELVIS CT SCAN WITHOUT CONTRAST: No significant abnormalities are seen of the liver, spleen, pancreas, adrenal glands, or kidneys for a non-IV contrast enhanced exam. Bowel loops are not significantly distended, although there  "is slightly increased small bowel gas. No bowel wall edema or pneumatosis is seen. Bladder is distended, and appears slightly thick-walled fluid for the degree of distention present, in particular, the anterior wall of the bladder appears thickened, almost masslike as seen on sagittal image #68 series 900. Maximal oblique diameter of the bladder is 13 mm. Uterus and ovaries are not identified, either absent or atrophic. No intrapelvic free fluid or inflammatory change is seen. Bony structures appear to be intact. Sclerotic changes of the left sacrum suggest an old healed insufficiency fracture here.  IMPRESSION: 1. Slightly \"gassy\" appearance the abdomen but no evidence of significant ileus or obstruction.  2. Distended bladder, with asymmetric thickening of the bladder dome, at least potentially a sessile mass.  3. No evidence of acute inflammatory process or other clearly acute intra-abdominal or intrapelvic disease.  4. Pattern of sclerosis of the left sacrum suggesting old healed sacral insufficiency fracture.  DICTATED:   10/15/2021 EDITED/ls :   10/15/2021   This report was finalized on 10/16/2021 11:18 PM by Dr. Ozzie Velasquez MD.            I have reviewed the medications:  Scheduled Meds:cefTRIAXone, 1 g, Intravenous, Q24H  heparin (porcine), 5,000 Units, Subcutaneous, Q12H  levothyroxine, 75 mcg, Oral, Q AM  metoprolol tartrate, 12.5 mg, Oral, Q12H      Continuous Infusions:   PRN Meds:.•  acetaminophen **OR** acetaminophen **OR** acetaminophen  •  melatonin  •  potassium chloride **OR** potassium chloride **OR** potassium chloride  •  sodium chloride  •  [COMPLETED] Insert peripheral IV **AND** sodium chloride    Assessment/Plan   Assessment & Plan     Active Hospital Problems    Diagnosis  POA   • **Acute cystitis without hematuria [N30.00]  Yes   • Dementia (HCC) [F03.90]  Yes   • Hypothyroidism [E03.9]  Yes   • History of breast cancer [Z85.3]  Not Applicable   • Essential hypertension [I10]  Yes   • " Chronic back pain [M54.9, G89.29]  Yes   • Atrial fibrillation (HCC) [I48.91]  Unknown      Resolved Hospital Problems   No resolved problems to display.        Brief Hospital Course to date:  Mary Ann Cooper is a 89 y.o. female nursing home resident with Hx of breast cancer, A. fib, dementia recently undergoing kyphoplasty 1 month ago at [facility] who has brought to the hospital for evaluation of diarrhea and confusion above baseline    Assessment/plan    Presumed UTI  -Patient unable to describe any symptoms at this time, UA c/w UTI  -Urine Cx with >100,000 CFU GNR  -Continue CTX empiric antibiotics    Asymmetric bladder wall thickening  -Unclear clinical significance at this time in the setting of presumed acute cystitis; continue antibiotics  -Seen by urology, recommend treating UTI and can follow-up outpatient as necessary    Acute metabolic encephalopathy  Baseline dementia  -No dementia medicines on MAR, does appear more energetic today and attempts to interact    Acute diarrhea  -No further diarrhea since admission    Presumed new onset atrial fibrillation  -No prior Hx of the same per family  -Per H&P no invasive investigation/intervention  -No anticoagulation per family request  -Continue metoprolol, modest HR control    Hypothyroidism  -Continue Synthroid    Hx of breast cancer, remote  -S/p BL mastectomy with reconstruction in 1989  -Per documentation recently had LT breast fluid collection evaluated at [facility] that could not be drained    Chronic back pain  -S/p kyphoplasty at [facility] 9/2021      DVT prophylaxis:  Medical and mechanical DVT prophylaxis orders are present.       AM-PAC 6 Clicks Score (PT): 8 (10/16/21 1142)    Disposition: Plan for IV antibiotics and await finalization of urine cultures, final disposition will depend on her improvement post treatment    CODE STATUS:   Code Status and Medical Interventions:   Ordered at: 10/15/21 1946     Limited Support to NOT Include:     Intubation     Level Of Support Discussed With:    Health Care Surrogate     Code Status:    No CPR     Medical Interventions (Level of Support Prior to Arrest):    Limited       Terry Sumner,   10/17/21

## 2021-10-17 NOTE — PROGRESS NOTES
Malnutrition Severity Assessment    Patient Name:  Mary Ann Cooper  YOB: 1930  MRN: [id number]  Admit Date:  10/15/2021    Patient meets criteria for : Severe Malnutrition based on muscle and fat losses, and energy intake status        Malnutrition Severity Assessment  Malnutrition Type: Acute Disease or Injury - Related Malnutrition  Malnutrition Type (last 8 hours)     Malnutrition Severity Assessment     Row Name 10/17/21 1125       Malnutrition Severity Assessment    Malnutrition Type Acute Disease or Injury - Related Malnutrition    Row Name 10/17/21 1125       Insufficient Energy Intake     Insufficient Energy Intake Findings Severe    Insufficient Energy Intake  < or equal to 50% of est. energy requirement for > or equal to 5d)    Dtr reports on 10/16/21 that pt has been eating less since moving to [facility] 3 months ago in July 2021. Dtr reports that  most signif change in po intake is during the past month, with pt eating less than 1/2 of usual po intake x 1 month    Row Name 10/17/21 1125       Unintentional Weight Loss     Unintentional Weight Loss  --  Dtr reports pt has had unintended wt loss (does not know specifics of wt change) since moving to [facility] in July 2021. RD attempted to call [facility] on 10/16 and 10/17 but  unsuccessful in obtaining pt's wt hx from facility    Row Name 10/17/21 1125       Muscle Loss    Loss of Muscle Mass Findings Severe  RD performed partial NFPE today while today's RN Chloe in room with RD. Pt very confused and not able to participate fully in exam.    [place] --  RD able to assess severe muscle loss at pectoralis, deltoid, gastrocnemius and quadriceps muscles. Moderate loss at temporalis. Pt not able to lean forward to examine back area.    Row Name 10/17/21 1125       Fat Loss    Subcutaneous Fat Loss Findings Severe  RD observed severe muscle loss at upper arm and thoracic regions    Row Name 10/17/21  1125       Criteria Met (Must meet criteria for severity in at least 2 of these categories: M Wasting, Fat Loss, Fluid, Secondary Signs, Wt. Status, Intake)    Patient meets criteria for  Severe Malnutrition  based on muscle and fat loss findings, and energy intake status                Electronically signed by:  Tamika Patel MS,RD,LD  10/17/21 12:14 EDT

## 2021-10-17 NOTE — PROGRESS NOTES
"Clinical Nutrition   Reason For Visit: follow up    Patient Name: Mary Ann Cooper  YOB: 1930  MRN: [id number]  Date of Encounter: 10/17/21 11:38 EDT  Admission date: 10/15/2021        Pt meets criteria for severe acute malnutrition based on energy intake status and fat and muscle losses observed by RD via NFPE.  Consider dysphagia eval as approp with GOC/POC.  Consider initiating supplemental noct TF if c/w POC/GOC, given pt's malnourished state and since pt not eating well during adm thus far.    Nutrition Assessment     Admission Problem List:    Acute cystitis without hematuria    AMS/Dementia (HCC)    Hypothyroidism    History of breast cancer    Essential hypertension    Chronic back pain    Atrial fibrillation (HCC)         Applicable PMH:        Applicable Nutrition-Related Information:        Applicable medical tests/procedures since admission:        PMH: She  has a past medical history of Cancer (HCC), Disease of thyroid gland, and Hypertension.   PSxH: She  has a past surgical history that includes Breast surgery and Hysterectomy.        Reported/Observed/Food/Nutrition Related History     10/17) Pt very confused. RD performed NFPE to assess for malnutrtion while DESIRE Corona in room.    10/16) RD spoke with pt's dtr Isadora Kenneth on phone (as listed in EMR emergency contacts) who states pt has been eating less than usual and had unintended wt loss (does not know specifics of po intake nor wt data) since moving to [facility] in July 2021.  Dtr states pt has \"always been thin.\" Dtr reports pt was eating well until July 2021 and states during the past month is the biggest change in po intake with pt eating less than 1/2 of usual po intake.    Dtr also states pt has had some recent vomiting, and dtr wonders if pt has forgotten how to swallow. (?)  RD attempted to call [facility] in an effort to obtain ht/wt and po intake hx data without success/was not able to get anyone to " answer my call.     Anthropometrics   Height: 69in per dtr  Weight: 92lb per bed wt on 10/15  BMI: 13.6 based on ht given by dtr.  BMI classification: underwt; <18.5  IBW: 145lb    UBW:   Weight change: dtr reports unintended wt loss over the past 3 months prior to adm; unknown amount        Nutrition Focused Physical Exam 10/17    RD performed partial NFPE today; see malnutrition note 10/17      Labs reviewed   Labs reviewed: Yes    Results from last 7 days   Lab Units 10/17/21  0936 10/16/21  1526 10/16/21  0255 10/15/21  1543 10/15/21  1543   SODIUM mmol/L 139  --  143  --  141   POTASSIUM mmol/L 3.8 4.2 3.8   < > 3.2*   CHLORIDE mmol/L 104  --  105  --  103   CO2 mmol/L 24.0  --  28.0  --  29.0   BUN mg/dL 14  --  20  --  22   CREATININE mg/dL 0.61  --  0.79  --  0.93   GLUCOSE mg/dL 86  --  90  --  135*   CALCIUM mg/dL 9.3  --  9.4  --  9.4   MAGNESIUM mg/dL  --   --  2.3  --   --     < > = values in this interval not displayed.     Results from last 7 days   Lab Units 10/16/21  0255 10/15/21  1543   WBC 10*3/mm3 7.39 6.57   ALBUMIN g/dL  --  3.70         No results found for: HGBA1C  Medications reviewed   Medications reviewed: Yes      Needs Assessment   Height used:   Weight used:   IBW:     Estimated Calories needs:       Estimated Protein needs:       Estimated Fluid needs:       Current Nutrition Prescription   PO: Diet Regular; Cardiac  Oral Nutrition Supplement: Boost plus 2x/d.     Average PO intake: 6% @ 4 meals            Nutrition Diagnosis   10/16  Problem Predicted suboptimal energy intake   Etiology Poor appetite   Sign/symptoms dtr reports pt eating less than 1/2 usual po intake during the past month prior to adm;     Pt eating avg of 6% @ past 4 meals  updated 10/17: ongoing    10/17  Problem malnutrition   Etiology Poor po intake   Signs/Symptoms Muscle and fat losses per NFPE 10/17       Intervention   Intervention: Follow treatment progress, Interview for preferences, Encourage intake,  Supplement provided   Consider initiating supplemental noct TF if c/w POC/GOC, given pt's malnourished state and since pt not eating well since adm thus far.    Goal:   General: Nutrition to support treatment  PO: increase intake  EN/PN:     Additional goals:       Monitoring/Evaluation:   Monitoring/Evaluation: Per protocol       Tamika Patel MS,RD,LD  Time Spent: 45 mins

## 2021-10-17 NOTE — PLAN OF CARE
Problem: Adult Inpatient Plan of Care  Goal: Plan of Care Review  Recent Flowsheet Documentation  Taken 10/17/2021 0945 by Silas Poe OT  Plan of Care Reviewed With: patient  Outcome Summary:   VSS   Pt Oriented to name, allowed bed mobility but resistant to supine<>sit and sitting balance unable to be assessed. Pt limited by cognition, generalized weakness assessed with bed mobility. Mod A for wash face. Pt appears below reported fxl baseline, will benefit from skilled OT services to address deficits, facilitate increased fxl I. Recommend SNF for rehab at discharge.   Goal Outcome Evaluation:  Plan of Care Reviewed With: patient           Outcome Summary: VSS; Pt Oriented to name, allowed bed mobility but resistant to supine<>sit and sitting balance unable to be assessed. Pt limited by cognition, generalized weakness assessed with bed mobility. Mod A for wash face. Pt appears below reported fxl baseline, will benefit from skilled OT services to address deficits, facilitate increased fxl I. Recommend SNF for rehab at discharge.

## 2021-10-17 NOTE — NURSING NOTE
Pt is not tolerating wearing the heart monitor or sat monitor. Spoke with Dr. Napier who agreed they could be left off. The pt's code status is DNR.   Rod Chisholm.  Foot x-ray looks okay–no fractures.  If your toe and foot pain continue I would consider seeing a podiatrist.  Dr. MEIER

## 2021-10-17 NOTE — PLAN OF CARE
Goal Outcome Evaluation:  Plan of Care Reviewed With: patient        Progress: no change  Outcome Summary: Patient oriented to name. Called [facility] and [facility]yn stated she lives in their memory care unit. Confirmed with the staff she takes synthroid and metoprolol. Obtained POA documents from [facility] and in hard copy chart. Patient is currently on tele and HR running in between  bpm with a-fib. Dr. Fajardo stated echo was not necessary. Obtained a new IV, see flow sheets. Patient eats 2-3 bites of meal trays but then refuses. Skin precautions are   Problem: Adult Inpatient Plan of Care  Goal: Plan of Care Review  Outcome: Ongoing, Progressing  Flowsheets (Taken 10/17/2021 1422)  Progress: no change  Plan of Care Reviewed With: patient  Outcome Summary: Patient oriented to name. Called [facility] and [facility] stated she lives in their memory care unit. Confirmed with the staff she takes synthroid and metoprolol. Obtained POA documents from [facility] and in hard copy chart. Patient is currently on tele and HR running  Goal: Patient-Specific Goal (Individualized)  Outcome: Ongoing, Progressing  Goal: Absence of Hospital-Acquired Illness or Injury  Outcome: Ongoing, Progressing  Intervention: Identify and Manage Fall Risk  Recent Flowsheet Documentation  Taken 10/17/2021 1400 by Chloe Bunch RN  Safety Promotion/Fall Prevention:   activity supervised   assistive device/personal items within reach   clutter free environment maintained   fall prevention program maintained   gait belt   lighting adjusted   nonskid shoes/slippers when out of bed   room organization consistent   safety round/check completed   toileting scheduled  Taken 10/17/2021 1200 by Chloe Bunch RN  Safety Promotion/Fall Prevention:   activity supervised   assistive device/personal items within reach   clutter free environment maintained   fall prevention program maintained   gait belt   lighting adjusted   nonskid shoes/slippers when out of bed   room  organization consistent   safety round/check completed   toileting scheduled  Taken 10/17/2021 1000 by Chloe Bunch RN  Safety Promotion/Fall Prevention:   activity supervised   assistive device/personal items within reach   clutter free environment maintained   fall prevention program maintained   gait belt   lighting adjusted   room organization consistent   safety round/check completed   toileting scheduled  Taken 10/17/2021 0800 by Chloe Bunch RN  Safety Promotion/Fall Prevention:   activity supervised   assistive device/personal items within reach   clutter free environment maintained   fall prevention program maintained   gait belt   lighting adjusted   nonskid shoes/slippers when out of bed   room organization consistent   toileting scheduled   safety round/check completed  Intervention: Prevent Skin Injury  Recent Flowsheet Documentation  Taken 10/17/2021 1400 by Chloe Bunch RN  Body Position: side-lying, right  Skin Protection:   adhesive use limited   incontinence pads utilized  Taken 10/17/2021 1200 by Chloe Bunch RN  Body Position: position changed independently  Skin Protection:   adhesive use limited   incontinence pads utilized  Taken 10/17/2021 1100 by Chloe Bunch RN  Skin Protection:   adhesive use limited   incontinence pads utilized   skin sealant/moisture barrier applied  Taken 10/17/2021 1000 by Chloe Bunch RN  Body Position: position changed independently  Skin Protection:   adhesive use limited   incontinence pads utilized  Taken 10/17/2021 0800 by Chloe Bunch RN  Body Position: position changed independently  Skin Protection:   adhesive use limited   incontinence pads utilized  Intervention: Prevent and Manage VTE (venous thromboembolism) Risk  Recent Flowsheet Documentation  Taken 10/17/2021 1400 by Chloe Bunch RN  VTE Prevention/Management: bleeding risk factor(s) identified  Taken 10/17/2021 1200 by Chloe Bunch RN  VTE  Prevention/Management: bleeding risk factor(s) identified  Taken 10/17/2021 1100 by Chloe Bunch RN  VTE Prevention/Management: bleeding risk factor(s) identified  Taken 10/17/2021 1000 by Chloe Bunch RN  VTE Prevention/Management: bleeding risk factor(s) identified  Taken 10/17/2021 0800 by Chloe Bunch RN  VTE Prevention/Management: bleeding risk factor(s) identified  Goal: Optimal Comfort and Wellbeing  Outcome: Ongoing, Progressing  Intervention: Provide Person-Centered Care  Recent Flowsheet Documentation  Taken 10/17/2021 1100 by Chloe Bunch RN  Trust Relationship/Rapport:   care explained   choices provided  Goal: Readiness for Transition of Care  Outcome: Ongoing, Progressing     Problem: Fall Injury Risk  Goal: Absence of Fall and Fall-Related Injury  Outcome: Ongoing, Progressing  Intervention: Identify and Manage Contributors to Fall Injury Risk  Recent Flowsheet Documentation  Taken 10/17/2021 1400 by Chloe Bunch RN  Medication Review/Management:   medications reviewed   high-risk medications identified  Taken 10/17/2021 1200 by Chloe Bunch RN  Medication Review/Management:   medications reviewed   high-risk medications identified  Taken 10/17/2021 1000 by Chloe Bunch RN  Medication Review/Management:   medications reviewed   high-risk medications identified  Taken 10/17/2021 0800 by Chloe Bunch RN  Medication Review/Management:   medications reviewed   high-risk medications identified  Intervention: Promote Injury-Free Environment  Recent Flowsheet Documentation  Taken 10/17/2021 1400 by Chloe Bunch RN  Safety Promotion/Fall Prevention:   activity supervised   assistive device/personal items within reach   clutter free environment maintained   fall prevention program maintained   gait belt   lighting adjusted   nonskid shoes/slippers when out of bed   room organization consistent   safety round/check completed   toileting scheduled  Taken 10/17/2021  1200 by Chloe Bunch RN  Safety Promotion/Fall Prevention:   activity supervised   assistive device/personal items within reach   clutter free environment maintained   fall prevention program maintained   gait belt   lighting adjusted   nonskid shoes/slippers when out of bed   room organization consistent   safety round/check completed   toileting scheduled  Taken 10/17/2021 1000 by Chloe Bunch RN  Safety Promotion/Fall Prevention:   activity supervised   assistive device/personal items within reach   clutter free environment maintained   fall prevention program maintained   gait belt   lighting adjusted   room organization consistent   safety round/check completed   toileting scheduled  Taken 10/17/2021 0800 by Chloe Bunch RN  Safety Promotion/Fall Prevention:   activity supervised   assistive device/personal items within reach   clutter free environment maintained   fall prevention program maintained   gait belt   lighting adjusted   nonskid shoes/slippers when out of bed   room organization consistent   toileting scheduled   safety round/check completed     Problem: Skin Injury Risk Increased  Goal: Skin Health and Integrity  Outcome: Ongoing, Progressing  Intervention: Optimize Skin Protection  Recent Flowsheet Documentation  Taken 10/17/2021 1400 by Chloe Bunch RN  Pressure Reduction Techniques: positioned off wounds  Head of Bed (HOB): [facility] [place] elevated  Pressure Reduction Devices: pressure-redistributing mattress utilized  Skin Protection:   adhesive use limited   incontinence pads utilized  Taken 10/17/2021 1200 by Chloe Bunch RN  Pressure Reduction Techniques: weight shift assistance provided  Head of Bed (HOB): [facility] [place] elevated  Pressure Reduction Devices: pressure-redistributing mattress utilized  Skin Protection:   adhesive use limited   incontinence pads utilized  Taken 10/17/2021 1100 by Chloe Bunch RN  Pressure Reduction Techniques:   positioned off wounds   weight shift  assistance provided  Pressure Reduction Devices: pressure-redistributing mattress utilized  Skin Protection:   adhesive use limited   incontinence pads utilized   skin sealant/moisture barrier applied  Taken 10/17/2021 1000 by Chloe Bunch RN  Pressure Reduction Techniques: positioned off wounds  Head of Bed (HOB): [facility] elevated  Pressure Reduction Devices: pressure-redistributing mattress utilized  Skin Protection:   adhesive use limited   incontinence pads utilized  Taken 10/17/2021 0800 by Chloe Bunch RN  Pressure Reduction Techniques: positioned off wounds  Head of Bed ([facility]): [facility] elevated  Pressure Reduction Devices: pressure-redistributing mattress utilized  Skin Protection:   adhesive use limited   incontinence pads utilized   in place. Family has not been in during this shift thus far. Patient sats at 95% on RA when intermittently spot checked. Patient does not keep pulse oximeter on. VSS.

## 2021-10-17 NOTE — PLAN OF CARE
Goal Outcome Evaluation:  Plan of Care Reviewed With: patient        Progress: no change   VSS, Pt a/o to self only, remains restless, pulling telemetry wires and O2 monitor off, removed gown, turns self in bed 180 degrees with head at bottom of bed, small amount of intake with meds crushed and placed in pudding, verbalizations are confused or calling out someone's name, remains incontinent of B/B, slept onand off during the shift.

## 2021-10-17 NOTE — THERAPY EVALUATION
Patient Name: Mary Ann Cooper  : 3/31/1930    MRN: [id number]                              Today's Date: 10/17/2021       Admit Date: 10/15/2021    Visit Dx:     ICD-10-CM ICD-9-CM   1. Acute cystitis without hematuria  N30.00 595.0   2. Bladder mass  N32.89 596.89   3. Confusion  R41.0 298.9   4. Dementia without behavioral disturbance, unspecified dementia type (HCC)  F03.90 294.20   5. Atrial fibrillation, unspecified type (HCC)  I48.91 427.31     Patient Active Problem List   Diagnosis   • Acute cystitis without hematuria   • Dementia (HCC)   • Hypothyroidism   • History of breast cancer   • Essential hypertension   • Chronic back pain   • Atrial fibrillation (HCC)     Past Medical History:   Diagnosis Date   • Cancer (HCC)    • Disease of thyroid gland    • Hypertension      Past Surgical History:   Procedure Laterality Date   • BREAST SURGERY     • HYSTERECTOMY        General Information     Row Name 10/17/21 0845          OT Time and Intention    Document Type evaluation  -TA     Mode of Treatment occupational therapy  -TA     Row Name 10/17/21 0845          General Information    Patient Profile Reviewed yes  -TA     Prior Level of Function independent:; gait; transfer; bed mobility; min assist:; ADL's  PLOF per chart review; pt ambulated ad-shaista with no AD at baseline, Min A ADLs  -TA     Existing Precautions/Restrictions fall  dementia  -TA     Barriers to Rehab previous functional deficit; cognitive status  -TA     Row Name 10/17/21 0845          Occupational Profile    Reason for Services/Referral (Occupational Profile) fxl decline from PLOF  -TA     Patient Goals (Occupational Profile) none stated  -TA     Row Name 10/17/21 0845          Living Environment    Lives With facility resident  Memory care  -TA     Row Name 10/17/21 0845          Home Main Entrance    Number of Stairs, Main Entrance none  -TA     Row Name 10/17/21 0845          Stairs Within Home, Primary    Number of Stairs, Within Home,  Primary none  -TA     Row Name 10/17/21 0845          Cognition    Orientation Status (Cognition) oriented to; person; disoriented to; place; situation; time  -TA     Row Name 10/17/21 0845          Safety Issues, Functional Mobility    Safety Issues Affecting Function (Mobility) awareness of need for assistance; insight into deficits/self-awareness; judgment; problem-solving; safety precaution awareness; safety precautions follow-through/compliance  -TA     Impairments Affecting Function (Mobility) endurance/activity tolerance; strength; cognition  -TA     Cognitive Impairments, Mobility Safety/Performance awareness, need for assistance; insight into deficits/self-awareness; judgment; problem-solving/reasoning; safety precaution awareness; safety precaution follow-through  -TA           User Key  (r) = Recorded By, (t) = Taken By, (c) = Cosigned By    Initials Name Provider Type    TA Silas Poe, OT Occupational Therapist                 Mobility/ADL's     Row Name 10/17/21 0945          Bed Mobility    Bed Mobility rolling left; rolling right; scooting/bridging  -TA     Rolling Left [place] (Bed Mobility) maximum assist (25% patient effort)  -TA     Rolling Right [place] (Bed Mobility) maximum assist (25% patient effort)  -TA     Scooting/Bridging [place] (Bed Mobility) maximum assist (25% patient effort)  -TA     Comment (Bed Mobility) Mobility inhibited by cognition/dementia at time of eval; pt side lying in flexed position upon arrival, end of eval  -TA     Row Name 10/17/21 0945          Transfers    Comment (Transfers) Unable to assess  -TA     Row Name 10/17/21 0945          Functional Mobility    Functional Mobility- Comment unable to assess  -TA     Row Name 10/17/21 0945          Activities of Daily Living    BADL Assessment/Intervention grooming  -TA     Row Name 10/17/21 0945          Grooming Assessment/Training    [place] Level (Grooming) wash face, hands; moderate assist  (50% patient effort)  -TA     Position (Grooming) supine  -TA           User Key  (r) = Recorded By, (t) = Taken By, (c) = Cosigned By    Initials Name Provider Type    Silas Wong OT Occupational Therapist               Obj/Interventions     Row Name 10/17/21 0945          Sensory Assessment (Somatosensory)    Sensory Assessment (Somatosensory) unable/difficult to assess  -TA     Row Name 10/17/21 0945          Vision Assessment/Intervention    Visual Impairment/Limitations unable/difficult to assess  -TA     Row Name 10/17/21 0945          Range of Motion Comprehensive    General Range of Motion bilateral upper extremity ROM WFL  -TA     Comment, General Range of Motion appears grossly WFL with bed mobility  -TA     Row Name 10/17/21 0945          Strength Comprehensive (MMT)    Comment, General Manual Muscle Testing (MMT) Assessment appears grossly 3+/5 with bed mobility  -TA     Row Name 10/17/21 0945          Balance    Balance Interventions occupation based/functional task; weight shifting activity  -TA     Comment, Balance Pt resistant to supine<>sit attempt, unable to assess sitting balance  -TA           User Key  (r) = Recorded By, (t) = Taken By, (c) = Cosigned By    Initials Name Provider Type    Silas Wong OT Occupational Therapist               Goals/Plan     Row Name 10/17/21 0945          Bed Mobility Goal 1 (OT)    Activity/Assistive Device (Bed Mobility Goal 1, OT) supine to sit; sit to supine  -TA     [place] Level/Cues Needed (Bed Mobility Goal 1, OT) minimum assist (75% or more patient effort)  -TA     Time Frame (Bed Mobility Goal 1, OT) by discharge  -TA     Progress/Outcomes (Bed Mobility Goal 1, OT) goal ongoing  -TA     Row Name 10/17/21 0945          Transfer Goal 1 (OT)    Activity/Assistive Device (Transfer Goal 1, OT) sit-to-stand/stand-to-sit; bed-to-chair/chair-to-bed; toilet  AAD  -TA     [place] Level/Cues Needed (Transfer Goal 1, OT) minimum  assist (75% or more patient effort); verbal cues required  -TA     Time Frame (Transfer Goal 1, OT) by discharge  -TA     Progress/Outcome (Transfer Goal 1, OT) goal ongoing  -TA     Row Name 10/17/21 0945          Dressing Goal 1 (OT)    Activity/Device (Dressing Goal 1, OT) upper body dressing  -TA     [place]/Cues Needed (Dressing Goal 1, OT) minimum assist (75% or more patient effort); verbal cues required  -TA     Time Frame (Dressing Goal 1, OT) by discharge  -TA     Progress/Outcome (Dressing Goal 1, OT) goal ongoing  -TA     Row Name 10/17/21 0945          Grooming Goal 1 (OT)    Activity/Device (Grooming Goal 1, OT) wash face, hands; hair care  -TA     [place] (Grooming Goal 1, OT) minimum assist (75% or more patient effort); verbal cues required  -TA     Time Frame (Grooming Goal 1, OT) by discharge  -TA     Progress/Outcome (Grooming Goal 1, OT) goal ongoing  -TA     Row Name 10/17/21 0945          Therapy Assessment/Plan (OT)    Planned Therapy Interventions (OT) activity tolerance training; BADL retraining; functional balance retraining; occupation/activity based interventions; patient/caregiver education/training; ROM/therapeutic exercise; strengthening exercise; transfer/mobility retraining  -TA           User Key  (r) = Recorded By, (t) = Taken By, (c) = Cosigned By    Initials Name Provider Type    Silas Wong, OT Occupational Therapist               Clinical Impression     Row Name 10/17/21 0945          Pain Assessment    Additional Documentation Pain Scale: Numbers Pre/Post-Treatment (Group)  -TA     Row Name 10/17/21 0945          Pain Scale: Numbers Pre/Post-Treatment    Pretreatment Pain Rating 0/10 - no pain  -TA     Posttreatment Pain Rating 0/10 - no pain  -TA     Pre/Posttreatment Pain Comment Pt denied pain, tolerated  -TA     Pain Intervention(s) Ambulation/increased activity; Repositioned  -TA     Row Name 10/17/21 0945          Plan of Care Review    Plan of Care  Reviewed With patient  -TA     Outcome Summary VSS; Pt Oriented to name, allowed bed mobility but resistant to supine<>sit and sitting balance unable to be assessed. Pt limited by cognition, generalized weakness assessed with bed mobility. Mod A for wash face. Pt appears below reported fxl baseline, will benefit from skilled OT services to address deficits, facilitate increased fxl I. Recommend SNF for rehab at discharge.  -TA     Row Name 10/17/21 0945          Therapy Assessment/Plan (OT)    Patient/Family Therapy Goal Statement (OT) none stated  -TA     Rehab Potential (OT) fair, will monitor progress closely  -TA     Criteria for Skilled Therapeutic Interventions Met (OT) yes; skilled treatment is necessary  -TA     Therapy Frequency (OT) daily  -TA     Predicted Duration of Therapy Intervention (OT) 1 week  -TA     Row Name 10/17/21 0945          Therapy Plan Review/Discharge Plan (OT)    Anticipated Discharge Disposition (OT) skilled nursing facility  rehab  -TA     Row Name 10/17/21 0945          Vital Signs    Pre Systolic BP Rehab --  VSS; RN cleared pt for tx  -TA     O2 Delivery Pre Treatment room air  -TA     O2 Delivery Intra Treatment room air  -TA     O2 Delivery Post Treatment room air  -TA     Pre Patient Position Side Lying  -TA     Intra Patient Position Supine  -TA     Post Patient Position Side Lying  -TA     Row Name 10/17/21 0945          Positioning and Restraints    Pre-Treatment Position in bed  -TA     Post Treatment Position bed  -TA     In Bed notified nsg; side lying left; call light within reach; encouraged to call for assist; exit alarm on; side rails up x3; pillow between legs; legs elevated  -TA           User Key  (r) = Recorded By, (t) = Taken By, (c) = Cosigned By    Initials Name Provider Type    Silas Wong, OT Occupational Therapist               Outcome Measures     Row Name 10/17/21 0945          How much help from another is currently needed...    Putting on and  taking off regular lower body clothing? 2  -TA     Bathing (including washing, rinsing, and drying) 2  -TA     Toileting (which includes using toilet bed pan or urinal) 1  -TA     Putting on and taking off regular upper body clothing 2  -TA     Taking care of personal grooming (such as brushing teeth) 2  -TA     Eating meals 3  -TA     AM-PAC 6 Clicks Score (OT) 12  -TA     Row Name 10/17/21 0800          How much help from another person do you currently need...    Turning from your back to your side while in flat bed without using bedrails? 2  -HS     Moving from lying on back to sitting on the side of a flat bed without bedrails? 2  -HS     Moving to and from a bed to a chair (including a wheelchair)? 1  -HS     Standing up from a chair using your arms (e.g., wheelchair, bedside chair)? 1  -HS     Climbing 3-5 steps with a railing? 1  -HS     To walk in hospital room? 1  -HS     AM-PAC 6 Clicks Score (PT) 8  -HS     Row Name 10/17/21 0945          Functional Assessment    Outcome Measure Options AM-PAC 6 Clicks Daily Activity (OT)  -TA           User Key  (r) = Recorded By, (t) = Taken By, (c) = Cosigned By    Initials Name Provider Type    Silas Wong OT Occupational Therapist     Chloe Bunch RN Registered Nurse                Occupational Therapy Education                 Title: PT OT SLP Therapies (In Progress)     Topic: Occupational Therapy (In Progress)     Point: ADL training (In Progress)     Description:   Instruct learner(s) on proper safety adaptation and remediation techniques during self care or transfers.   Instruct in proper use of assistive devices.              Learning Progress Summary           Patient Acceptance, E, NR by TA at 10/17/2021 1017                   Point: Home exercise program (In Progress)     Description:   Instruct learner(s) on appropriate technique for monitoring, assisting and/or progressing therapeutic exercises/activities.              Learning Progress  Summary           Patient Acceptance, E, NR by TA at 10/17/2021 1017                   Point: Precautions (In Progress)     Description:   Instruct learner(s) on prescribed precautions during self-care and functional transfers.              Learning Progress Summary           Patient Acceptance, E, NR by TA at 10/17/2021 1017                   Point: Body mechanics (In Progress)     Description:   Instruct learner(s) on proper positioning and spine alignment during self-care, functional mobility activities and/or exercises.              Learning Progress Summary           Patient Acceptance, E, NR by TA at 10/17/2021 1017                               User Key     Initials Effective Dates Name Provider Type Discipline    TA 06/16/21 -  Silas Poe, OT Occupational Therapist OT              OT Recommendation and Plan  Planned Therapy Interventions (OT): activity tolerance training, BADL retraining, functional balance retraining, occupation/activity based interventions, patient/caregiver education/training, ROM/therapeutic exercise, strengthening exercise, transfer/mobility retraining  Therapy Frequency (OT): daily  Plan of Care Review  Plan of Care Reviewed With: patient  Outcome Summary: VSS; Pt Oriented to name, allowed bed mobility but resistant to supine<>sit and sitting balance unable to be assessed. Pt limited by cognition, generalized weakness assessed with bed mobility. Mod A for wash face. Pt appears below reported fxl baseline, will benefit from skilled OT services to address deficits, facilitate increased fxl I. Recommend SNF for rehab at discharge.     Time Calculation:    Time Calculation- OT     Row Name 10/17/21 0945             Time Calculation- OT    OT Start Time 0945  ttc 0 minutes  -TA      Total Timed Code Minutes- OT 0 minute(s)  -TA      OT Received On 10/17/21  -TA      OT Goal Re-Cert Due Date 10/27/21  -TA              Untimed Charges    OT Eval/Re-eval Minutes 39  -TA               Total Minutes    Untimed Charges Total Minutes 39  -TA       Total Minutes 39  -TA            User Key  (r) = Recorded By, (t) = Taken By, (c) = Cosigned By    Initials Name Provider Type    Silas Wong OT Occupational Therapist              Therapy Charges for Today     Code Description Service Date Service Provider Modifiers Qty    [id number]  OT EVAL MOD COMPLEXITY 3 10/17/2021 Silas Poe OT GO 1               Silas Poe OT  10/17/2021

## 2021-10-18 PROBLEM — E43 SEVERE MALNUTRITION (HCC): Status: ACTIVE | Noted: 2021-01-01

## 2021-10-18 NOTE — PROGRESS NOTES
[facility]  PROGRESS NOTE    Patient Name: Mary Ann Cooper  : 3/31/1930  MRN: [id number]    Date of Admission: 10/15/2021  Primary Care Physician: Christi Gold MD    Subjective   Subjective     CC:  Follow-up UTI    HPI:  Patient nonverbal for me this morning, minimally participating with therapies.  32-minute phone conversation with patient's stepdaughter Isadora Webster other 2 children listed in chart did not answer phone).  She reported progressive decline over months, diagnosis of dementia and subsequent placement within a nursing facility, patient with angry outbursts and refusing to eat, substantial weight loss.    ROS:  Unable to be obtained due to patient's mental status    Objective   Objective     Vital Signs:   Temp:  [97.4 °F (36.3 °C)-97.8 °F (36.6 °C)] 97.4 °F (36.3 °C)  Heart Rate:  [] 119  Resp:  [18] 18  BP: (103-139)/() 119/104     Physical Exam:  Constitutional: Frail-appearing elderly female laying in bed, opens eyes and looks at me to tactile stimulation but will not/cannot speak  HENT: NCAT, mucous membranes moist  Respiratory: Clear to auscultation bilaterally, respiratory effort normal   Cardiovascular: RRR, palpable radial pulses  Gastrointestinal: Positive bowel sounds, soft, nondistended  Musculoskeletal: Cachexia, no lower extremity edema  Neurologic: Moving all extremities spontaneously    Results Reviewed:  LAB RESULTS:      Lab 10/16/21  0255 10/15/21  1543   WBC 7.39 6.57   HEMOGLOBIN 15.2 15.6   HEMATOCRIT 44.2 45.3   PLATELETS 296 305   NEUTROS ABS 4.26 3.88   IMMATURE GRANS (ABS) 0.02 0.02   LYMPHS ABS 1.83 1.55   MONOS ABS 0.83 0.72   EOS ABS 0.39 0.34   MCV 88.9 89.5         Lab 10/17/21  0936 10/16/21  1526 10/16/21  0255 10/15/21  1543   SODIUM 139  --  143 141   POTASSIUM 3.8 4.2 3.8 3.2*   CHLORIDE 104  --  105 103   CO2 24.0  --  28.0 29.0   ANION GAP 11.0  --  10.0 9.0   BUN 14  --  20 22   CREATININE 0.61   --  0.79 0.93   GLUCOSE 86  --  90 135*   CALCIUM 9.3  --  9.4 9.4   MAGNESIUM  --   --  2.3  --    TSH  --   --  7.370*  --          Lab 10/15/21  1543   TOTAL PROTEIN 6.6   ALBUMIN 3.70   GLOBULIN 2.9   ALT (SGPT) 21   AST (SGOT) 30   BILIRUBIN 0.5   ALK PHOS 258*   LIPASE 69*         Lab 10/15/21  1543   PROBNP 989.9   TROPONIN T <0.010                 Brief Urine Lab Results  (Last result in the past 365 days)      Color   Clarity   Blood   Leuk Est   Nitrite   Protein   CREAT   Urine HCG        10/15/21 1544 Yellow   Clear   Trace   Trace   Positive   100 mg/dL (2+)                 Microbiology Results Abnormal     Procedure Component Value - Date/Time    Gastrointestinal Panel, PCR - Stool, Per Rectum [[id number]]  (Normal) Collected: 10/16/21 2027    Lab Status: Final result Specimen: Stool from Per Rectum Updated: 10/16/21 2153     Campylobacter Not Detected     Plesiomonas shigelloides Not Detected     Salmonella Not Detected     Vibrio Not Detected     Vibrio cholerae Not Detected     Yersinia enterocolitica Not Detected     Enteroaggregative E. coli (EAEC) Not Detected     Enteropathogenic E. coli (EPEC) Not Detected     Enterotoxigenic E. coli (ETEC) lt/st Not Detected     Shiga-like toxin-producing E. coli (STEC) stx1/stx2 Not Detected     Shigella/Enteroinvasive E. coli (EIEC) Not Detected     Cryptosporidium Not Detected     Cyclospora cayetanensis Not Detected     Entamoeba histolytica Not Detected     Giardia lamblia Not Detected     Adenovirus F40/41 Not Detected     Astrovirus Not Detected     Norovirus GI/GII Not Detected     Rotavirus A Not Detected     Sapovirus (I, II, IV or V) Not Detected    Clostridium Difficile Toxin - Stool, Per Rectum [[id number]]  (Normal) Collected: 10/16/21 2027    Lab Status: Final result Specimen: Stool from Per Rectum Updated: 10/16/21 2125    Narrative:      The following orders were created for panel order Clostridium Difficile Toxin - Stool, Per Rectum.  Procedure                                Abnormality         Status                     ---------                               -----------         ------                     Clostridium Difficile To...[[id number]]  Normal              Final result                 Please view results for these tests on the individual orders.    Clostridium Difficile Toxin, PCR - Stool, Per Rectum [[id number]]  (Normal) Collected: 10/16/21 2027    Lab Status: Final result Specimen: Stool from Per Rectum Updated: 10/16/21 2125     C. Difficile Toxins by PCR Not Detected    Narrative:      Performance characteristics of test not established for patients <2 years of age.  Negative for Toxigenic C. Difficile    COVID PRE-OP / PRE-PROCEDURE SCREENING ORDER (NO ISOLATION) - Swab, Nasopharynx [[id number]]  (Normal) Collected: 10/15/21 1737    Lab Status: Final result Specimen: Swab from Nasopharynx Updated: 10/15/21 1807    Narrative:      The following orders were created for panel order COVID PRE-OP / PRE-PROCEDURE SCREENING ORDER (NO ISOLATION) - Swab, Nasopharynx.  Procedure                               Abnormality         Status                     ---------                               -----------         ------                     COVID-19 and FLU A/B PCR...[[id number]]  Normal              Final result                 Please view results for these tests on the individual orders.    COVID-19 and FLU A/B PCR - Swab, Nasopharynx [[id number]]  (Normal) Collected: 10/15/21 1737    Lab Status: Final result Specimen: Swab from Nasopharynx Updated: 10/15/21 1807     COVID19 Not Detected     Influenza A PCR Not Detected     Influenza B PCR Not Detected    Narrative:      Fact sheet for providers: https://www.fda.gov/media/183318/download    Fact sheet for patients: https://www.fda.gov/media/551947/download    Test performed by PCR.          No radiology results from the last 24 hrs        I have reviewed the medications:  Scheduled Meds:cefTRIAXone, 1 g,  Intravenous, Q24H  heparin (porcine), 5,000 Units, Subcutaneous, Q12H  levothyroxine, 75 mcg, Oral, Q AM  metoprolol tartrate, 12.5 mg, Oral, Q12H      Continuous Infusions:   PRN Meds:.•  acetaminophen **OR** acetaminophen **OR** acetaminophen  •  melatonin  •  potassium chloride **OR** potassium chloride **OR** potassium chloride  •  sodium chloride  •  [COMPLETED] Insert peripheral IV **AND** sodium chloride    Assessment/Plan   Assessment & Plan     Active Hospital Problems    Diagnosis  POA   • **Acute cystitis without hematuria [N30.00]  Yes   • Dementia (HCC) [F03.90]  Yes   • Hypothyroidism [E03.9]  Yes   • History of breast cancer [Z85.3]  Not Applicable   • Essential hypertension [I10]  Yes   • Chronic back pain [M54.9, G89.29]  Yes   • Atrial fibrillation (HCC) [I48.91]  Unknown      Resolved Hospital Problems   No resolved problems to display.        Brief Hospital Course to date:  Mary Ann Cooper is a 89 y.o. female nursing home resident with Hx of breast cancer, A. fib, dementia recently undergoing kyphoplasty 1 month ago at [facility] who has brought to the hospital for evaluation of diarrhea and confusion above baseline; she has been treated with IV antibiotics for presumed urinary infection with stable vital/labs, however she has had minimal oral intake and is minimally interactive    Assessment/plan    Enterobacter cloacae UTI  -Patient unable to describe any symptoms, UA c/w UTI  -Continue CTX through tomorrow to complete 5 total days therapy    Asymmetric bladder wall thickening  -Unclear clinical significance at this time in the setting of presumed acute cystitis; continue antibiotics  -Seen by urology, recommend treating UTI and can follow-up outpatient as necessary    Acute metabolic encephalopathy  Baseline dementia  Failure to thrive in the adult  -Long discussion with patient stepdaughter, it appears she is rapidly declined over the previous month since being removed to her nursing facility, she  has been refusing to eat and losing weight over months with overall withdrawn affect  -Start low-dose Remeron HS    Acute diarrhea, improved  -No further diarrhea reported since admission    Presumed new onset atrial fibrillation  -No prior Hx of the same per family  -Per H&P no invasive investigation/intervention  -No anticoagulation per family request  -Increase metoprolol (new medicine this admit) dose    Hypothyroidism  -Continue Synthroid    Hx of breast cancer, remote  -S/p BL mastectomy with reconstruction in 1989  -Per documentation recently had LT breast fluid collection evaluated at [facility] that could not be drained    Chronic back pain  -S/p kyphoplasty at [facility] 9/2021      DVT prophylaxis:  Medical and mechanical DVT prophylaxis orders are present.       AM-PAC 6 Clicks Score (PT): 8 (10/17/21 0800)    Disposition: I suspect this patient is near her baseline status per my discussion with family today, I did broach the subject of hospice and Isadora would like to speak with some of her siblings; anticipate need for SNF with or without hospice at discharge    CODE STATUS:   Code Status and Medical Interventions:   Ordered at: 10/15/21 1946     Limited Support to NOT Include:    Intubation     Level Of Support Discussed With:    Health Care Surrogate     Code Status:    No CPR     Medical Interventions (Level of Support Prior to Arrest):    Limited       Terry Sumner, DO  10/18/21

## 2021-10-18 NOTE — THERAPY TREATMENT NOTE
Patient Name: Mary Ann Cooper  : 3/31/1930    MRN: [id number]                              Today's Date: 10/18/2021       Admit Date: 10/15/2021    Visit Dx:     ICD-10-CM ICD-9-CM   1. Acute cystitis without hematuria  N30.00 595.0   2. Bladder mass  N32.89 596.89   3. Confusion  R41.0 298.9   4. Dementia without behavioral disturbance, unspecified dementia type (HCC)  F03.90 294.20   5. Atrial fibrillation, unspecified type (HCC)  I48.91 427.31     Patient Active Problem List   Diagnosis   • Acute cystitis without hematuria   • Dementia (HCC)   • Hypothyroidism   • History of breast cancer   • Essential hypertension   • Chronic back pain   • Atrial fibrillation (HCC)   • Severe malnutrition (CMS/HCC)     Past Medical History:   Diagnosis Date   • Cancer (HCC)    • Disease of thyroid gland    • Hypertension      Past Surgical History:   Procedure Laterality Date   • BREAST SURGERY     • HYSTERECTOMY        General Information     Row Name 10/18/21 1115          OT Time and Intention    Document Type therapy note (daily note)  -TA     Mode of Treatment occupational therapy  -TA     Row Name 10/18/21 1115          General Information    Patient Profile Reviewed yes  -TA     Existing Precautions/Restrictions fall  Dementia  -TA     Barriers to Rehab previous functional deficit; cognitive status; uncooperative  -TA     Row Name 10/18/21 1115          Cognition    Orientation Status (Cognition) disoriented to; person; place; situation; time  Unable to state name, looks at therapist when addressed as 'Mary Ann'  -TA     Row Name 10/18/21 1115          Safety Issues, Functional Mobility    Safety Issues Affecting Function (Mobility) ability to follow commands; awareness of need for assistance; friction/shear risk; insight into deficits/self-awareness; judgment; problem-solving; safety precaution awareness; safety precautions follow-through/compliance; sequencing abilities  -TA     Impairments Affecting Function (Mobility)  endurance/activity tolerance; cognition; balance; strength  -TA     Cognitive Impairments, Mobility Safety/Performance attention; awareness, need for assistance; insight into deficits/self-awareness; judgment; problem-solving/reasoning; safety precaution awareness; safety precaution follow-through; sequencing abilities  -TA           User Key  (r) = Recorded By, (t) = Taken By, (c) = Cosigned By    Initials Name Provider Type    TA Silas Poe OT Occupational Therapist                 Mobility/ADL's     Row Name 10/18/21 1115          Bed Mobility    Bed Mobility rolling left; rolling right; scooting/bridging  -TA     Rolling Left [place] (Bed Mobility) dependent (less than 25% patient effort)  -TA     Rolling Right [place] (Bed Mobility) dependent (less than 25% patient effort)  -TA     Scooting/Bridging [place] (Bed Mobility) dependent (less than 25% patient effort); 2 person assist  -TA     Bed Mobility, Safety Issues cognitive deficits limit understanding; decreased use of arms for pushing/pulling; decreased use of legs for bridging/pushing  -TA     Assistive Device (Bed Mobility) draw sheet  -TA     Comment (Bed Mobility) Pt resistant to supine>sit at EOB, unable to complete  -TA     Row Name 10/18/21 1115          Transfers    Comment (Transfers) Deferred, pt resistant to movement  -TA     Row Name 10/18/21 1115          Activities of Daily Living    BADL Assessment/Intervention lower body dressing; grooming; toileting  -TA     Row Name 10/18/21 1115          Grooming Assessment/Training    [place] Level (Grooming) wash face, hands; dependent (less than 25% patient effort)  -TA     Position (Grooming) supine  -TA     Row Name 10/18/21 1115          Lower Body Dressing Assessment/Training    [place] Level (Lower Body Dressing) don; socks; dependent (less than 25% patient effort)  -TA     Position (Lower Body Dressing) supine  -TA     Row Name 10/18/21 1115          Toileting  Assessment/Training    [place] Level (Toileting) adjust/manage clothing; perform perineal hygiene; dependent (less than 25% patient effort)  -TA     Position (Toileting) supine  -TA     Comment (Toileting) Pt incontinent of bowel in supine  -TA           User Key  (r) = Recorded By, (t) = Taken By, (c) = Cosigned By    Initials Name Provider Type    Silas Wong, OT Occupational Therapist               Obj/Interventions     Row Name 10/18/21 [facility]5          Balance    Balance Interventions occupation based/functional task; weight shifting activity  -TA           User Key  (r) = Recorded By, (t) = Taken By, (c) = Cosigned By    Initials Name Provider Type    Silas Wong, OT Occupational Therapist               Goals/Plan     Row Name 10/18/21 [facility]          Therapy Assessment/Plan (OT)    Planned Therapy Interventions (OT) activity tolerance training; BADL retraining; functional balance retraining; occupation/activity based interventions; patient/caregiver education/training; ROM/therapeutic exercise; strengthening exercise; transfer/mobility retraining  -TA           User Key  (r) = Recorded By, (t) = Taken By, (c) = Cosigned By    Initials Name Provider Type    Silas Wong, RAFIQ Occupational Therapist               Clinical Impression     Row Name 10/18/21 [facility]          Pain Assessment    Additional Documentation Pain Scale: FACES Pre/Post-Treatment (Group)  -TA     Row Name 10/18/21 [facility]          Pain Scale: FACES Pre/Post-Treatment    Pain: FACES Scale, Pretreatment 0-->no hurt  -TA     Posttreatment Pain Rating 0-->no hurt  -TA     Row Name 10/18/21 [facility]          Plan of Care Review    Plan of Care Reviewed With patient  -TA     Progress no change  -TA     Outcome Summary VSS; Pt in R sidelying upon OT's entry. Pt had BM in bed - dependent rolling R/L for dependent post toilet hygiene, dependent x 2 to scoot up in bed, dependent to wash/dry face/hands. Pt very resistant to  movement and dementia inhibits performance. OT frequency changed to 3X/week. Continue per OT POC. Recommend LTC at discharge.  -TA     Row Name 10/18/21 1115          Therapy Assessment/Plan (OT)    Patient/Family Therapy Goal Statement (OT) none stated  -TA     Rehab Potential (OT) fair, will monitor progress closely  -TA     Criteria for Skilled Therapeutic Interventions Met (OT) yes; skilled treatment is necessary  -TA     Therapy Frequency (OT) 3 times/wk  -TA     Predicted Duration of Therapy Intervention (OT) 1 week  -TA     Row Name 10/18/21 1115          Therapy Plan Review/Discharge Plan (OT)    Anticipated Discharge Disposition (OT) long-term acute [facility] facility  -TA     Row Name 10/18/21 1115          Vital Signs    Pre Systolic BP Rehab --  VSS; RN cleared pt for tx  -TA     O2 Delivery Pre Treatment room air  -TA     O2 Delivery Intra Treatment room air  -TA     O2 Delivery Post Treatment room air  -TA     Pre Patient Position Side Lying  -TA     Intra Patient Position Supine  -TA     Post Patient Position Side Lying  -TA     Row Name 10/18/21 1115          Positioning and Restraints    Pre-Treatment Position in bed  -TA     Post Treatment Position bed  -TA     In Bed notified nsg; side lying left; call light within reach; encouraged to call for assist; exit alarm on; patient within staff view; side rails up x3; legs elevated  -TA           User Key  (r) = Recorded By, (t) = Taken By, (c) = Cosigned By    Initials Name Provider Type    Silas Wong, OT Occupational Therapist               Outcome Measures     Row Name 10/18/21 1115          How much help from another is currently needed...    Putting on and taking off regular lower body clothing? 1  -TA     Bathing (including washing, rinsing, and drying) 1  -TA     Toileting (which includes using toilet bed pan or urinal) 1  -TA     Putting on and taking off regular upper body clothing 1  -TA     Taking care of personal grooming (such as  brushing teeth) 1  -TA     Eating meals 1  -TA     AM-PAC 6 Clicks Score (OT) 6  -TA     Row Name 10/18/21 0800          How much help from another person do you currently need...    Turning from your back to your side while in flat bed without using bedrails? 2  -HS     Moving from lying on back to sitting on the side of a flat bed without bedrails? 2  -HS     Moving to and from a bed to a chair (including a wheelchair)? 1  -HS     Standing up from a chair using your arms (e.g., wheelchair, bedside chair)? 1  -HS     Climbing 3-5 steps with a railing? 1  -HS     To walk in hospital room? 1  -HS     AM-PAC 6 Clicks Score (PT) 8  -HS     Row Name 10/18/21 1115          Functional Assessment    Outcome Measure Options AM-PAC 6 Clicks Daily Activity (OT)  -TA           User Key  (r) = Recorded By, (t) = Taken By, (c) = Cosigned By    Initials Name Provider Type    TA Silas Poe OT Occupational Therapist     Chloe Bunch RN Registered Nurse                Occupational Therapy Education                 Title: PT OT SLP Therapies (In Progress)     Topic: Occupational Therapy (In Progress)     Point: ADL training (In Progress)     Description:   Instruct learner(s) on proper safety adaptation and remediation techniques during self care or transfers.   Instruct in proper use of assistive devices.              Learning Progress Summary           Patient Acceptance, E, NR by TA at 10/17/2021 1017                   Point: Home exercise program (In Progress)     Description:   Instruct learner(s) on appropriate technique for monitoring, assisting and/or progressing therapeutic exercises/activities.              Learning Progress Summary           Patient Acceptance, E, NR by TA at 10/17/2021 1017                   Point: Precautions (In Progress)     Description:   Instruct learner(s) on prescribed precautions during self-care and functional transfers.              Learning Progress Summary           Patient  Acceptance, E, NR by TA at 10/17/2021 1017                   Point: Body mechanics (In Progress)     Description:   Instruct learner(s) on proper positioning and spine alignment during self-care, functional mobility activities and/or exercises.              Learning Progress Summary           Patient Acceptance, E, NR by TA at 10/17/2021 1017                               User Key     Initials Effective Dates Name Provider Type Discipline     06/16/21 -  Silas Poe OT Occupational Therapist OT              OT Recommendation and Plan  Planned Therapy Interventions (OT): activity tolerance training, BADL retraining, functional balance retraining, occupation/activity based interventions, patient/caregiver education/training, ROM/therapeutic exercise, strengthening exercise, transfer/mobility retraining  Therapy Frequency (OT): 3 times/wk  Plan of Care Review  Plan of Care Reviewed With: patient  Progress: no change  Outcome Summary: VSS; Pt in R sidelying upon OT's entry. Pt had BM in bed - dependent rolling R/L for dependent post toilet hygiene, dependent x 2 to scoot up in bed, dependent to wash/dry face/hands. Pt very resistant to movement and dementia inhibits performance. OT frequency changed to 3X/week. Continue per OT POC. Recommend LTC at discharge.     Time Calculation:    Time Calculation- OT     Row Name 10/18/21 1115             Time Calculation- OT    OT Start Time 1115  ttc 16 minutes  -TA      Total Timed Code Minutes- OT 16 minute(s)  -TA      OT Received On 10/18/21  -TA      OT Goal Re-Cert Due Date 10/27/21  -TA            User Key  (r) = Recorded By, (t) = Taken By, (c) = Cosigned By    Initials Name Provider Type     Silas Poe OT Occupational Therapist              Therapy Charges for Today     Code Description Service Date Service Provider Modifiers Qty    [id number]  OT EVAL MOD COMPLEXITY 3 10/17/2021 Silas Poe OT GO 1    [id number]  OT SELF  CARE/MGMT/TRAIN EA 15 MIN 10/18/2021 Silas Poe, OT GO 1               Silas Poe OT  10/18/2021

## 2021-10-18 NOTE — THERAPY TREATMENT NOTE
Patient Name: Mary Ann Cooper  : 3/31/1930    MRN: [id number]                              Today's Date: 10/18/2021       Admit Date: 10/15/2021    Visit Dx:     ICD-10-CM ICD-9-CM   1. Acute cystitis without hematuria  N30.00 595.0   2. Bladder mass  N32.89 596.89   3. Confusion  R41.0 298.9   4. Dementia without behavioral disturbance, unspecified dementia type (HCC)  F03.90 294.20   5. Atrial fibrillation, unspecified type (HCC)  I48.91 427.31     Patient Active Problem List   Diagnosis   • Acute cystitis without hematuria   • Dementia (HCC)   • Hypothyroidism   • History of breast cancer   • Essential hypertension   • Chronic back pain   • Atrial fibrillation (HCC)   • Severe malnutrition (CMS/HCC)     Past Medical History:   Diagnosis Date   • Cancer (HCC)    • Disease of thyroid gland    • Hypertension      Past Surgical History:   Procedure Laterality Date   • BREAST SURGERY     • HYSTERECTOMY        General Information     Row Name 10/18/21 1355          Physical Therapy Time and Intention    Document Type therapy note (daily note)  -KG     Mode of Treatment physical therapy  -KG     Row Name 10/18/21 1359          General Information    Existing Precautions/Restrictions fall  baseline dementia  -KG     Barriers to Rehab medically complex; previous functional deficit; cognitive status  -KG     Row Name 10/18/21 135          Cognition    Orientation Status (Cognition) disoriented to; person; place; situation  -KG     Row Name 10/18/21 1354          Safety Issues, Functional Mobility    Safety Issues Affecting Function (Mobility) ability to follow commands; awareness of need for assistance; insight into deficits/self-awareness; safety precaution awareness; safety precautions follow-through/compliance  -KG     Impairments Affecting Function (Mobility) cognition; endurance/activity tolerance; range of motion (ROM); strength  -KG     Cognitive Impairments, Mobility Safety/Performance attention; awareness, need for  assistance; insight into deficits/self-awareness; safety precaution awareness; safety precaution follow-through  -KG           User Key  (r) = Recorded By, (t) = Taken By, (c) = Cosigned By    Initials Name Provider Type    Pam Ware PT Physical Therapist               Mobility     Row Name 10/18/21 1403          Bed Mobility    Bed Mobility rolling left; rolling right  -KG     Rolling Left [place] (Bed Mobility) minimum assist (75% patient effort); verbal cues  -KG     Rolling Right [place] (Bed Mobility) minimum assist (75% patient effort); verbal cues  -KG     Comment (Bed Mobility) Pt rolled L and R with minimal assistance for positional changes. Pt declined any EOB mobility. Resistant to movement.  -KG     Row Name 10/18/21 1403          Transfers    Comment (Transfers) Deferred any OOB mobility this date; not appropriate to assess.  -KG     Row Name 10/18/21 1403          Bed-Chair Transfer    Bed-Chair [place] (Transfers) unable to assess  -KG     Row Name 10/18/21 1403          Sit-Stand Transfer    Sit-Stand [place] (Transfers) unable to assess  -KG     Row Name 10/18/21 1403          Gait/Stairs (Locomotion)    [place] Level (Gait) unable to assess  -KG     Comment (Gait/Stairs) Ambulation deferred. Not appropriate to assess.  -KG           User Key  (r) = Recorded By, (t) = Taken By, (c) = Cosigned By    Initials Name Provider Type    Pam Ware PT Physical Therapist               Obj/Interventions     Row Name 10/18/21 1405          Motor Skills    Therapeutic Exercise hip; knee; ankle  -KG     Row Name 10/18/21 1405          Hip (Therapeutic Exercise)    Hip (Therapeutic Exercise) strengthening exercise  -KG     Hip Strengthening (Therapeutic Exercise) bilateral; flexion; extension; aBduction; aDduction; external rotation; internal rotation; heel slides; supine; 10 repetitions  -KG     Row Name 10/18/21 1405          Knee (Therapeutic Exercise)     Knee (Therapeutic Exercise) strengthening exercise  -KG     Knee Strengthening (Therapeutic Exercise) bilateral; flexion; extension; SLR (straight leg raise); SAQ (short arc quad); supine; 10 repetitions  -KG     Row Name 10/18/21 1409          Ankle (Therapeutic Exercise)    Ankle (Therapeutic Exercise) strengthening exercise  -KG     Ankle Strengthening (Therapeutic Exercise) bilateral; dorsiflexion; plantarflexion; supine; 10 repetitions  -KG           User Key  (r) = Recorded By, (t) = Taken By, (c) = Cosigned By    Initials Name Provider Type    KG Pam Nunez N, PT Physical Therapist               Goals/Plan    No documentation.                Clinical Impression     Row Name 10/18/21 1406          Pain    Additional Documentation Pain Scale: FACES Pre/Post-Treatment (Group)  -KG     Row Name 10/18/21 1401          Pain Scale: FACES Pre/Post-Treatment    Pain: FACES Scale, Pretreatment 0-->no hurt  -KG     Posttreatment Pain Rating 0-->no hurt  -KG     Row Name 10/18/21 1400          Plan of Care Review    Plan of Care Reviewed With patient  -KG     Progress no change  -KG     Outcome Summary Pt rolled L and R with Pineda. Pt declined any EOB/OOB mobility this session. Very resistant to movement at times. Pt tolerated bed level ther ex, but unable to follow commands to actively participate. Continue to progress as appropriate.  -KG     Row Name 10/18/21 1401          Vital Signs    Pre Systolic BP Rehab 123  -KG     Pre Treatment Diastolic BP 82  -KG     Pretreatment Heart Rate (beats/min) 80  -KG     Posttreatment Heart Rate (beats/min) 83  -KG     O2 Delivery Pre Treatment room air  -KG     O2 Delivery Post Treatment room air  -KG     Pre Patient Position Side Lying  -KG     Intra Patient Position Supine  -KG     Post Patient Position Side Lying  -KG     Row Name 10/18/21 140          Positioning and Restraints    Pre-Treatment Position in bed  -KG     Post Treatment Position bed  -KG     In Bed  notified nsg; side lying left; call light within reach; encouraged to call for assist; side rails up x3  -KG           User Key  (r) = Recorded By, (t) = Taken By, (c) = Cosigned By    Initials Name Provider Type    Pam Ware, PT Physical Therapist               Outcome Measures     Row Name 10/18/21 1411 10/18/21 0800       How much help from another person do you currently need...    Turning from your back to your side while in flat bed without using bedrails? 3  -KG 2  -HS    Moving from lying on back to sitting on the side of a flat bed without bedrails? 2  -KG 2  -HS    Moving to and from a bed to a chair (including a wheelchair)? 1  -KG 1  -HS    Standing up from a chair using your arms (e.g., wheelchair, bedside chair)? 1  -KG 1  -HS    Climbing 3-5 steps with a railing? 1  -KG 1  -HS    To walk in hospital room? 1  -KG 1  -HS    AM-PAC 6 Clicks Score (PT) 9  -KG 8  -HS    Row Name 10/18/21 1411 10/18/21 1115       Functional Assessment    Outcome Measure Options AM-PAC 6 Clicks Basic Mobility (PT)  -KG AM-PAC 6 Clicks Daily Activity (OT)  -TA          User Key  (r) = Recorded By, (t) = Taken By, (c) = Cosigned By    Initials Name Provider Type    Silas Wong OT Occupational Therapist    Chloe Ramirez RN Registered Nurse    Pam Ware, PT Physical Therapist                             Physical Therapy Education                 Title: PT OT SLP Therapies (In Progress)     Topic: Physical Therapy (In Progress)     Point: Mobility training (In Progress)     Learning Progress Summary           Patient Acceptance, E, NR by KG at 10/18/2021 1312    Nonacceptance, E, NR by KM at 10/16/2021 1143    Comment: pt with AMS, difficult to assess                   Point: Home exercise program (In Progress)     Learning Progress Summary           Patient Acceptance, E, NR by KG at 10/18/2021 1312    Nonacceptance, E, NR by KM at 10/16/2021 1143    Comment: pt with AMS,  difficult to assess                   Point: Body mechanics (In Progress)     Learning Progress Summary           Patient Acceptance, E, NR by KG at 10/18/2021 1312    Nonacceptance, E, NR by KM at 10/16/2021 1143    Comment: pt with AMS, difficult to assess                   Point: Precautions (In Progress)     Learning Progress Summary           Patient Acceptance, E, NR by KG at 10/18/2021 1312    Nonacceptance, E, NR by KM at 10/16/2021 1143    Comment: pt with AMS, difficult to assess                               User Key     Initials Effective Dates Name Provider Type Discipline     06/16/21 -  Subha Patton, PT Physical Therapist PT    KG 05/22/20 -  Pam Nunez, PT Physical Therapist PT              PT Recommendation and Plan     Plan of Care Reviewed With: patient  Progress: no change  Outcome Summary: Pt rolled L and R with Pineda. Pt declined any EOB/OOB mobility this session. Very resistant to movement at times. Pt tolerated bed level ther ex, but unable to follow commands to actively participate. Continue to progress as appropriate.     Time Calculation:    PT Charges     Row Name 10/18/21 1312             Time Calculation    Start Time 1312  -KG      PT Received On 10/18/21  -KG      PT Goal Re-Cert Due Date 10/26/21  -KG              Time Calculation- PT    Total Timed Code Minutes- PT 12 minute(s)  -KG              Timed Charges    82938 - PT Therapeutic Exercise Minutes 12  -KG              Total Minutes    Timed Charges Total Minutes 12  -KG       Total Minutes 12  -KG            User Key  (r) = Recorded By, (t) = Taken By, (c) = Cosigned By    Initials Name Provider Type    KG Pam Nunez, PT Physical Therapist              Therapy Charges for Today     Code Description Service Date Service Provider Modifiers Qty    55742398876 HC PT THER PROC EA 15 MIN 10/18/2021 Pam Nunez, PT GP 1          PT G-Codes  Outcome Measure Options: AM-PAC 6 Clicks Basic  Mobility (PT)  AM-PAC 6 Clicks Score (PT): 9  AM-PAC 6 Clicks Score (OT): 6    Moriah Nunez, PT  10/18/2021

## 2021-10-18 NOTE — CASE MANAGEMENT/SOCIAL WORK
Discharge Planning Assessment  [facility]     Patient Name: Mary Ann Cooper  MRN: [id number]  Today's Date: 10/18/2021    Admit Date: 10/15/2021     Discharge Needs Assessment     Row Name 10/18/21 1442       Living Environment    Lives With facility resident    Name(s) of Who Lives With Patient [facility]    Current Living Arrangements home/apartment/condo    Primary Care Provided by other (see comments)  facility staff    Provides Primary Care For no one, unable/limited ability to care for self    Family Caregiver if Needed child(ji), adult; other (see comments)    Family Caregiver Names children Willie and 2 step daughters Isadora and Steph    Quality of Family Relationships helpful; involved; supportive    Able to Return to Prior Arrangements yes    Living Arrangement Comments resident at [facility]       Resource/Environmental Concerns    Resource/Environmental Concerns none       Transition Planning    Patient/Family Anticipates Transition to long-term care facility    Patient/Family Anticipated Services at Transition skilled nursing       Discharge Needs Assessment    Readmission Within the Last 30 Days no previous admission in last 30 days    Current Outpatient/Agency/Support Group skilled nursing facility    Equipment Currently Used at Home walker, rolling    Concerns to be Addressed discharge planning    Anticipated Changes Related to Illness none    Equipment Needed After Discharge none    Provided Post Acute Provider List? N/A    Provided Post Acute Provider Quality & Resource List? N/A    Patient's Choice of Community Agency(s) Pt is a resident at [facility]- memory Care               Discharge Plan     Row Name 10/18/21 1444       Plan    Plan return to [facility] care    Patient/Family in Agreement with Plan yes    Plan Comments CM spoke with pts son JAMAICA Gil via phone as he lives in [place]. Pt is a resident at [facility], in  memory care unit per son. Pt recently had a surgery approx 1 month ago and has needed to ambulate with a walker since her surgery. Pt is usually able to feed herself at baseline. Pt has POA papers on file. Pt has received her covid vaccinations.Pt has Medicare, son denies concerns or disruption in coverage. Pt has prescriptions provided at [facility]. Per Willie plan is for pt to return to [facility] at time of discharge. Pt's step daughters Steph Espino and Isadora Cooper live closer to pt and also are available if needed per son Willie. CM has left message with Shannon at [facility] to verify bed status and update on pt status, provided number for Shannon to return call. CM will continue to follow.    Final Discharge Disposition Code 30 - still a patient              Continued Care and Services - Admitted Since 10/15/2021    Coordination has not been started for this encounter.          Demographic Summary     Row Name 10/18/21 1439       General Information    Referral Source admission list    Reason for Consult discharge planning    Preferred Language English     Used During This Interaction no    General Information Comments PCP- Christi Gold       Contact Information    Permission Granted to Share Info With     Contact Information Comments Willie Moss- [place] [phone]               Functional Status     Row Name 10/18/21 1440       Functional Status    Usual Activity Tolerance fair    Current Activity Tolerance poor       Functional Status, IADL    Medications completely dependent    Meal Preparation completely dependent    Housekeeping completely dependent    Laundry completely dependent    Shopping completely dependent    IADL Comments pt ambulatroy with RW and usually able to feed herself       Employment/    Employment/ Comments Pt has Medicare, son denies concerns or disruption in coverage. Pt has prescriptions provided at [facility].                Psychosocial    No documentation.                Abuse/Neglect    No documentation.                Legal    No documentation.                Substance Abuse    No documentation.                Patient Forms    No documentation.                   Vandana Reynolds RN

## 2021-10-18 NOTE — PLAN OF CARE
Goal Outcome Evaluation:  Plan of Care Reviewed With: patient        Progress: no change  Outcome Summary: Pt rolled L and R with Pineda. Pt declined any EOB/OOB mobility this session. Very resistant to movement at times. Pt tolerated bed level ther ex, but unable to follow commands to actively participate. Continue to progress as appropriate.

## 2021-10-18 NOTE — PLAN OF CARE
Problem: Adult Inpatient Plan of Care  Goal: Plan of Care Review  Recent Flowsheet Documentation  Taken 10/18/2021 1115 by Silas Poe, OT  Progress: no change  Plan of Care Reviewed With: patient  Outcome Summary:   VSS   Pt in R sidelying upon OT's entry. Pt had BM in bed - dependent rolling R/L for dependent post toilet hygiene, dependent x 2 to scoot up in bed, dependent to wash/dry face/hands. Pt very resistant to movement and dementia inhibits performance. OT frequency changed to 3X/week. Continue per OT POC. Recommend LTC at discharge.   Goal Outcome Evaluation:  Plan of Care Reviewed With: patient        Progress: no change  Outcome Summary: VSS; Pt in R sidelying upon OT's entry. Pt had BM in bed - dependent rolling R/L for dependent post toilet hygiene, dependent x 2 to scoot up in bed, dependent to wash/dry face/hands. Pt very resistant to movement and dementia inhibits performance. OT frequency changed to 3X/week. Continue per OT POC. Recommend LTC at discharge.

## 2021-10-18 NOTE — PLAN OF CARE
Problem: Adult Inpatient Plan of Care  Goal: Plan of Care Review  Outcome: Ongoing, Progressing  Flowsheets (Taken 10/17/2021 2319)  Progress: no change  Plan of Care Reviewed With: patient  Outcome Summary: Patient AOx0, incomprehensible sentences, giving tylenol around the clock-calms down after dose. Turning q2h and changing-incontinent. Will continue to monitor.     Problem: Adult Inpatient Plan of Care  Goal: Patient-Specific Goal (Individualized)  Outcome: Ongoing, Progressing     Problem: Adult Inpatient Plan of Care  Goal: Absence of Hospital-Acquired Illness or Injury  Outcome: Ongoing, Progressing     Problem: Adult Inpatient Plan of Care  Goal: Absence of Hospital-Acquired Illness or Injury  Intervention: Identify and Manage Fall Risk  Recent Flowsheet Documentation  Taken 10/17/2021 2200 by Alana Alexander RN  Safety Promotion/Fall Prevention:   toileting scheduled   safety round/check completed   room organization consistent   activity supervised   assistive device/personal items within reach   clutter free environment maintained   fall prevention program maintained   nonskid shoes/slippers when out of bed  Taken 10/17/2021 2000 by Alana Alexander RN  Safety Promotion/Fall Prevention:   toileting scheduled   safety round/check completed   room organization consistent   activity supervised   assistive device/personal items within reach   fall prevention program maintained   lighting adjusted   nonskid shoes/slippers when out of bed     Problem: Adult Inpatient Plan of Care  Goal: Absence of Hospital-Acquired Illness or Injury  Intervention: Prevent Skin Injury  Recent Flowsheet Documentation  Taken 10/17/2021 2200 by Alana Alexander RN  Body Position:   turned   side-lying, left  Skin Protection: incontinence pads utilized  Taken 10/17/2021 2000 by Alana Alexander RN  Body Position: supine  Skin Protection:   incontinence pads utilized   skin-to-skin areas padded     Problem: Adult Inpatient Plan of  Care  Goal: Absence of Hospital-Acquired Illness or Injury  Intervention: Prevent and Manage VTE (venous thromboembolism) Risk  Recent Flowsheet Documentation  Taken 10/17/2021 2000 by Alana Alexander RN  VTE Prevention/Management: (Heparin subq) bleeding risk factor(s) identified     Problem: Adult Inpatient Plan of Care  Goal: Optimal Comfort and Wellbeing  Outcome: Ongoing, Progressing     Problem: Adult Inpatient Plan of Care  Goal: Optimal Comfort and Wellbeing  Intervention: Provide Person-Centered Care  Recent Flowsheet Documentation  Taken 10/17/2021 2000 by Alana Alexander RN  Trust Relationship/Rapport:   care explained   choices provided     Problem: Adult Inpatient Plan of Care  Goal: Readiness for Transition of Care  Outcome: Ongoing, Progressing     Problem: Fall Injury Risk  Goal: Absence of Fall and Fall-Related Injury  Outcome: Ongoing, Progressing     Problem: Fall Injury Risk  Goal: Absence of Fall and Fall-Related Injury  Intervention: Identify and Manage Contributors to Fall Injury Risk  Recent Flowsheet Documentation  Taken 10/17/2021 2200 by Alana Alexander RN  Medication Review/Management:   medications reviewed   high-risk medications identified  Taken 10/17/2021 2000 by Alana Alexander RN  Medication Review/Management:   medications reviewed   high-risk medications identified     Problem: Fall Injury Risk  Goal: Absence of Fall and Fall-Related Injury  Intervention: Promote Injury-Free Environment  Recent Flowsheet Documentation  Taken 10/17/2021 2200 by Alana Alexander RN  Safety Promotion/Fall Prevention:   toileting scheduled   safety round/check completed   room organization consistent   activity supervised   assistive device/personal items within reach   clutter free environment maintained   fall prevention program maintained   nonskid shoes/slippers when out of bed  Taken 10/17/2021 2000 by Alana Alexander RN  Safety Promotion/Fall Prevention:   toileting scheduled   safety  round/check completed   room organization consistent   activity supervised   assistive device/personal items within reach   fall prevention program maintained   lighting adjusted   nonskid shoes/slippers when out of bed     Problem: Skin Injury Risk Increased  Goal: Skin Health and Integrity  Outcome: Ongoing, Progressing     Problem: Skin Injury Risk Increased  Goal: Skin Health and Integrity  Intervention: Optimize Skin Protection  Recent Flowsheet Documentation  Taken 10/17/2021 2200 by Alana Alexander, RN  Pressure Reduction Techniques:   frequent weight shift encouraged   weight shift assistance provided   sit time limited to 2 hours  Head of Bed (HOB): HOB at 20-30 degrees  Pressure Reduction Devices: specialty bed utilized  Skin Protection: incontinence pads utilized  Taken 10/17/2021 2000 by Alana Alexander RN  Pressure Reduction Techniques:   frequent weight shift encouraged   weight shift assistance provided   sit time limited to 2 hours  Head of Bed (HOB): HOB at 20-30 degrees  Pressure Reduction Devices: specialty bed utilized  Skin Protection:   incontinence pads utilized   skin-to-skin areas padded   Goal Outcome Evaluation:  Plan of Care Reviewed With: patient        Progress: no change  Outcome Summary: Patient AOx0, incomprehensible sentences, giving tylenol around the clock-calms down after dose. Turning q2h and changing-incontinent. Will continue to monitor.

## 2021-10-18 NOTE — PLAN OF CARE
Goal Outcome Evaluation:  Plan of Care Reviewed With: patient        Progress: improving  Outcome Summary: It appears patient has more of an appetite and is more awake today. Has trouble chewing meat. Patient had 1 BM today. VSS and on RA. Patient turns herself well now that she is more awake.  Problem: Adult Inpatient Plan of Care  Goal: Plan of Care Review  Outcome: Ongoing, Progressing  Flowsheets (Taken 10/18/2021 1354)  Progress: improving  Plan of Care Reviewed With: patient  Outcome Summary: It appears patient has more of an  Goal: Patient-Specific Goal (Individualized)  Outcome: Ongoing, Progressing  Goal: Absence of Hospital-Acquired Illness or Injury  Outcome: Ongoing, Progressing  Intervention: Identify and Manage Fall Risk  Recent Flowsheet Documentation  Taken 10/18/2021 1200 by Chloe Bunch RN  Safety Promotion/Fall Prevention:   activity supervised   assistive device/personal items within reach   clutter free environment maintained   fall prevention program maintained   gait belt   lighting adjusted   nonskid shoes/slippers when out of bed   room organization consistent   safety round/check completed   toileting scheduled  Taken 10/18/2021 1030 by Chloe Bunch RN  Safety Promotion/Fall Prevention:   activity supervised   clutter free environment maintained   assistive device/personal items within reach   fall prevention program maintained   gait belt   lighting adjusted   nonskid shoes/slippers when out of bed   safety round/check completed   toileting scheduled   room organization consistent  Taken 10/18/2021 0900 by Chloe Bunch RN  Safety Promotion/Fall Prevention:   activity supervised   assistive device/personal items within reach   clutter free environment maintained   fall prevention program maintained   lighting adjusted   gait belt   nonskid shoes/slippers when out of bed   room organization consistent   toileting scheduled   safety round/check completed  Taken 10/18/2021 0800  by Stappe, Chloe A, RN  Safety Promotion/Fall Prevention:   activity supervised   assistive device/personal items within reach   clutter free environment maintained   fall prevention program maintained   lighting adjusted   gait belt   nonskid shoes/slippers when out of bed   toileting scheduled   safety round/check completed   room organization consistent  Intervention: Prevent Skin Injury  Recent Flowsheet Documentation  Taken 10/18/2021 1200 by Chloe Bunch RN  Body Position: side-lying, right  Skin Protection:   adhesive use limited   incontinence pads utilized   skin sealant/moisture barrier applied  Taken 10/18/2021 1030 by Chloe Bunch RN  Body Position: position changed independently  Skin Protection:   adhesive use limited   incontinence pads utilized   skin sealant/moisture barrier applied  Taken 10/18/2021 0900 by Chloe Bunch RN  Body Position: side-lying, left  Skin Protection:   adhesive use limited   incontinence pads utilized   skin sealant/moisture barrier applied  Taken 10/18/2021 0800 by Chloe Bunch RN  Body Position: supine, legs elevated  Skin Protection:   incontinence pads utilized   adhesive use limited  Intervention: Prevent and Manage VTE (venous thromboembolism) Risk  Recent Flowsheet Documentation  Taken 10/18/2021 1200 by Chloe Bunch RN  VTE Prevention/Management: bleeding risk factor(s) identified  Taken 10/18/2021 1030 by Chloe Bunch RN  VTE Prevention/Management: bleeding risk factor(s) identified  Taken 10/18/2021 0900 by Chloe Bunch RN  VTE Prevention/Management: bleeding risk factor(s) identified  Goal: Optimal Comfort and Wellbeing  Outcome: Ongoing, Progressing  Intervention: Provide Person-Centered Care  Recent Flowsheet Documentation  Taken 10/18/2021 0900 by Chloe Bunch RN  Trust Relationship/Rapport:   care explained   choices provided  Goal: Readiness for Transition of Care  Outcome: Ongoing, Progressing     Problem: Fall Injury  Risk  Goal: Absence of Fall and Fall-Related Injury  Outcome: Ongoing, Progressing  Intervention: Identify and Manage Contributors to Fall Injury Risk  Recent Flowsheet Documentation  Taken 10/18/2021 1200 by Chloe Bunch RN  Medication Review/Management:   medications reviewed   high-risk medications identified  Taken 10/18/2021 1030 by Chloe Bunch RN  Medication Review/Management: high-risk medications identified  Taken 10/18/2021 0900 by Chloe Bunch RN  Medication Review/Management:   medications reviewed   high-risk medications identified  Taken 10/18/2021 0800 by Chloe Bunch RN  Medication Review/Management:   medications reviewed   high-risk medications identified  Intervention: Promote Injury-Free Environment  Recent Flowsheet Documentation  Taken 10/18/2021 1200 by Chloe Bunch RN  Safety Promotion/Fall Prevention:   activity supervised   assistive device/personal items within reach   clutter free environment maintained   fall prevention program maintained   gait belt   lighting adjusted   nonskid shoes/slippers when out of bed   room organization consistent   safety round/check completed   toileting scheduled  Taken 10/18/2021 1030 by Chloe Bunch RN  Safety Promotion/Fall Prevention:   activity supervised   clutter free environment maintained   assistive device/personal items within reach   fall prevention program maintained   gait belt   lighting adjusted   nonskid shoes/slippers when out of bed   safety round/check completed   toileting scheduled   room organization consistent  Taken 10/18/2021 0900 by Chloe Bunch RN  Safety Promotion/Fall Prevention:   activity supervised   assistive device/personal items within reach   clutter free environment maintained   fall prevention program maintained   lighting adjusted   gait belt   nonskid shoes/slippers when out of bed   room organization consistent   toileting scheduled   safety round/check completed  Taken 10/18/2021  0800 by Chloe Bunch RN  Safety Promotion/Fall Prevention:   activity supervised   assistive device/personal items within reach   clutter free environment maintained   fall prevention program maintained   lighting adjusted   gait belt   nonskid shoes/slippers when out of bed   toileting scheduled   safety round/check completed   room organization consistent     Problem: Skin Injury Risk Increased  Goal: Skin Health and Integrity  Outcome: Ongoing, Progressing  Intervention: Optimize Skin Protection  Recent Flowsheet Documentation  Taken 10/18/2021 1200 by Chloe Bunch RN  Pressure Reduction Techniques: weight shift assistance provided  Head of Bed ([facility]): [facility] elevated  Pressure Reduction Devices: specialty bed utilized  Skin Protection:   adhesive use limited   incontinence pads utilized   skin sealant/moisture barrier applied  Taken 10/18/2021 1030 by Chloe Bunch RN  Pressure Reduction Techniques: weight shift assistance provided  Head of Bed ([facility]): [facility] elevated  Pressure Reduction Devices: specialty bed utilized  Skin Protection:   adhesive use limited   incontinence pads utilized   skin sealant/moisture barrier applied  Taken 10/18/2021 0900 by Chloe Bunch RN  Pressure Reduction Techniques: weight shift assistance provided  Head of Bed ([facility]): [facility] elevated  Pressure Reduction Devices: specialty bed utilized  Skin Protection:   adhesive use limited   incontinence pads utilized   skin sealant/moisture barrier applied  Taken 10/18/2021 0800 by Chloe Bunch RN  Pressure Reduction Techniques: weight shift assistance provided  Head of Bed ([facility]): [facility] elevated  Pressure Reduction Devices: specialty bed utilized  Skin Protection:   incontinence pads utilized   adhesive use limited

## 2021-10-18 NOTE — NURSING NOTE
Daily Low Carl <=14    Carl score: 10    At this time the patient's RN reports no new skin issues or needs.  Interventions in place. Continue to provide good general skin care. Apply barrier cream daily/ PRN. Keep patient dry and turn regularly.  Elevate and offload heels. The patient is on a low air loss mattress at this time.     Please contact [facility] if new skin issues arise.

## 2021-10-19 NOTE — CASE MANAGEMENT/SOCIAL WORK
Continued Stay Note  [facility]     Patient Name: Mary Ann Cooper  MRN: [id number]  Today's Date: 10/19/2021    Admit Date: 10/15/2021     Discharge Plan     Row Name 10/19/21 1001       Plan    Plan Comments CM spoke with Shannon at [facility] and pt is an assisted living resident in [facility]. Shannon assessing for long term care at facility and will notify CM. Dr. Sumner spoke with pt's step daughter yesterday and discussed possible hospice support and they are going to discuss with family. CM will continue to follow.               Discharge Codes    No documentation.                     Vandana Reynolds RN

## 2021-10-19 NOTE — PROGRESS NOTES
[facility] Medicine Services  PROGRESS NOTE    Patient Name: Mary Ann Cooper  : 3/31/1930  MRN: [id number]    Date of Admission: 10/15/2021  Primary Care Physician: Christi Gold MD    Subjective   Subjective     CC:  Follow-up UTI    HPI:  Awake and alert, minimally answering but denies pain or needs.    ROS:  Limited    Objective   Objective     Vital Signs:   Temp:  [96.7 °F (35.9 °C)-97.9 °F (36.6 °C)] 97.6 °F (36.4 °C)  Heart Rate:  [] 96  Resp:  [16-18] 16  BP: (112-143)/() 128/105     Physical Exam:  NAD, alert  OP clear, MMM  PERRL  Neck supple  RRR  CTAB  +BS, ND, NT, soft  Cachexia  BOOKER  No rashes  Talking today, yes/no only    Results Reviewed:  LAB RESULTS:      Lab 10/16/21  0255 10/15/21  1543   WBC 7.39 6.57   HEMOGLOBIN 15.2 15.6   HEMATOCRIT 44.2 45.3   PLATELETS 296 305   NEUTROS ABS 4.26 3.88   IMMATURE GRANS (ABS) 0.02 0.02   LYMPHS ABS 1.83 1.55   MONOS ABS 0.83 0.72   EOS ABS 0.39 0.34   MCV 88.9 89.5         Lab 10/17/21  0936 10/16/21  1526 10/16/21  0255 10/15/21  1543   SODIUM 139  --  143 141   POTASSIUM 3.8 4.2 3.8 3.2*   CHLORIDE 104  --  105 103   CO2 24.0  --  28.0 29.0   ANION GAP 11.0  --  10.0 9.0   BUN 14  --  20 22   CREATININE 0.61  --  0.79 0.93   GLUCOSE 86  --  90 135*   CALCIUM 9.3  --  9.4 9.4   MAGNESIUM  --   --  2.3  --    TSH  --   --  7.370*  --          Lab 10/15/21  1543   TOTAL PROTEIN 6.6   ALBUMIN 3.70   GLOBULIN 2.9   ALT (SGPT) 21   AST (SGOT) 30   BILIRUBIN 0.5   ALK PHOS 258*   LIPASE 69*         Lab 10/15/21  1543   PROBNP 989.9   TROPONIN T <0.010                 Brief Urine Lab Results  (Last result in the past 365 days)      Color   Clarity   Blood   Leuk Est   Nitrite   Protein   CREAT   Urine HCG        10/15/21 1544 Yellow   Clear   Trace   Trace   Positive   100 mg/dL (2+)                 Microbiology Results Abnormal     Procedure Component Value - Date/Time    Gastrointestinal Panel, PCR - Stool, Per Rectum  [[id number]]  (Normal) Collected: 10/16/21 2027    Lab Status: Final result Specimen: Stool from Per Rectum Updated: 10/16/21 2153     Campylobacter Not Detected     Plesiomonas shigelloides Not Detected     Salmonella Not Detected     Vibrio Not Detected     Vibrio cholerae Not Detected     Yersinia enterocolitica Not Detected     Enteroaggregative E. coli (EAEC) Not Detected     Enteropathogenic E. coli (EPEC) Not Detected     Enterotoxigenic E. coli (ETEC) lt/st Not Detected     Shiga-like toxin-producing E. coli (STEC) stx1/stx2 Not Detected     Shigella/Enteroinvasive E. coli (EIEC) Not Detected     Cryptosporidium Not Detected     Cyclospora cayetanensis Not Detected     Entamoeba histolytica Not Detected     Giardia lamblia Not Detected     Adenovirus F40/41 Not Detected     Astrovirus Not Detected     Norovirus GI/GII Not Detected     Rotavirus A Not Detected     Sapovirus (I, II, IV or V) Not Detected    Clostridium Difficile Toxin - Stool, Per Rectum [[id number]]  (Normal) Collected: 10/16/21 2027    Lab Status: Final result Specimen: Stool from Per Rectum Updated: 10/16/21 2125    Narrative:      The following orders were created for panel order Clostridium Difficile Toxin - Stool, Per Rectum.  Procedure                               Abnormality         Status                     ---------                               -----------         ------                     Clostridium Difficile To...[[id number]]  Normal              Final result                 Please view results for these tests on the individual orders.    Clostridium Difficile Toxin, PCR - Stool, Per Rectum [[id number]]  (Normal) Collected: 10/16/21 2027    Lab Status: Final result Specimen: Stool from Per Rectum Updated: 10/16/21 2125     C. Difficile Toxins by PCR Not Detected    Narrative:      Performance characteristics of test not established for patients <2 years of age.  Negative for Toxigenic C. Difficile    COVID PRE-OP / PRE-PROCEDURE  SCREENING ORDER (NO ISOLATION) - Swab, Nasopharynx [[id number]]  (Normal) Collected: 10/15/21 1737    Lab Status: Final result Specimen: Swab from Nasopharynx Updated: 10/15/21 1807    Narrative:      The following orders were created for panel order COVID PRE-OP / PRE-PROCEDURE SCREENING ORDER (NO ISOLATION) - Swab, Nasopharynx.  Procedure                               Abnormality         Status                     ---------                               -----------         ------                     COVID-19 and FLU A/B PCR...[[id number]]  Normal              Final result                 Please view results for these tests on the individual orders.    COVID-19 and FLU A/B PCR - Swab, Nasopharynx [[id number]]  (Normal) Collected: 10/15/21 1737    Lab Status: Final result Specimen: Swab from Nasopharynx Updated: 10/15/21 1807     COVID19 Not Detected     Influenza A PCR Not Detected     Influenza B PCR Not Detected    Narrative:      Fact sheet for providers: https://www.fda.gov/media/142672/download    Fact sheet for patients: https://www.fda.gov/media/915792/download    Test performed by PCR.          No radiology results from the last 24 hrs        I have reviewed the medications:  Scheduled Meds:cefTRIAXone, 1 g, Intravenous, Q24H  heparin (porcine), 5,000 Units, Subcutaneous, Q12H  levothyroxine, 75 mcg, Oral, Q AM  metoprolol tartrate, 25 mg, Oral, Q12H  mirtazapine, 15 mg, Oral, Nightly      Continuous Infusions:lactated ringers, 75 mL/hr, Last Rate: 75 mL/hr (10/18/21 2047)      PRN Meds:.•  acetaminophen **OR** acetaminophen **OR** acetaminophen  •  melatonin  •  potassium chloride **OR** potassium chloride **OR** potassium chloride  •  sodium chloride  •  [COMPLETED] Insert peripheral IV **AND** sodium chloride    Assessment/Plan   Assessment & Plan     Active Hospital Problems    Diagnosis  POA   • **Acute cystitis without hematuria [N30.00]  Yes   • Severe malnutrition (CMS/HCC) [E43]  Yes   • Dementia  (HCC) [F03.90]  Yes   • Hypothyroidism [E03.9]  Yes   • History of breast cancer [Z85.3]  Not Applicable   • Essential hypertension [I10]  Yes   • Chronic back pain [M54.9, G89.29]  Yes   • Atrial fibrillation (HCC) [I48.91]  Unknown      Resolved Hospital Problems   No resolved problems to display.        Brief Hospital Course to date:  Mary Ann Cooper is a 89 y.o. female nursing home resident with Hx of breast cancer, A. fib, dementia recently undergoing kyphoplasty 1 month ago at [facility] who has brought to the hospital for evaluation of diarrhea and confusion above baseline; she has been treated with IV antibiotics for presumed urinary infection with stable vital/labs, however she has had minimal oral intake and is minimally interactive    Assessment/plan    Enterobacter cloacae UTI  --completing rocephin    Asymmetric bladder wall thickening  --unclear significance  --possibly due to UTI, outpatient follow up with urology prn  -Unclear clinical significance at this time in the setting of presumed acute cystitis; continue antibiotics  -Seen by urology, recommend treating UTI and can follow-up outpatient as necessary    Acute metabolic encephalopathy  Baseline dementia  Failure to thrive in the adult  --on low dose remeron, team d/w family, needs placement, palliative care assistance    Acute diarrhea, improved  -No further diarrhea reported since admission    Presumed new onset atrial fibrillation  --no prior history  --no AC per family request  --on BB    Hypothyroidism  -Continue Synthroid    Hx of breast cancer, remote  -S/p BL mastectomy with reconstruction in 1989  -Per documentation recently had LT breast fluid collection evaluated at [facility] that could not be drained    Chronic back pain  -S/p kyphoplasty at [facility] 9/2021      DVT prophylaxis:  Medical and mechanical DVT prophylaxis orders are present.       AM-PAC 6 Clicks Score (PT): 8 (10/18/21 2000)    Disposition: Needs placement,  palliative care assistance.    CODE STATUS:   Code Status and Medical Interventions:   Ordered at: 10/15/21 1946     Limited Support to NOT Include:    Intubation     Level Of Support Discussed With:    Health Care Surrogate     Code Status:    No CPR     Medical Interventions (Level of Support Prior to Arrest):    Limited       Ozzie Souza MD  10/19/21

## 2021-10-19 NOTE — PLAN OF CARE
Problem: Adult Inpatient Plan of Care  Goal: Plan of Care Review  Outcome: Ongoing, Progressing  Flowsheets (Taken 10/19/2021 0324)  Progress: no change  Plan of Care Reviewed With: patient  Outcome Summary: Patient disorientated to person, place and time. Q2hr turn with spec mattress in place. IV fluids currently infusing. Will continue plan of care.  Goal: Patient-Specific Goal (Individualized)  Outcome: Ongoing, Progressing  Goal: Absence of Hospital-Acquired Illness or Injury  Outcome: Ongoing, Progressing  Intervention: Identify and Manage Fall Risk  Recent Flowsheet Documentation  Taken 10/19/2021 0200 by Roseanna Quezada RN  Safety Promotion/Fall Prevention:   activity supervised   fall prevention program maintained   nonskid shoes/slippers when out of bed   room organization consistent   toileting scheduled  Taken 10/19/2021 0000 by Roseanna Quezada RN  Safety Promotion/Fall Prevention:   activity supervised   fall prevention program maintained   nonskid shoes/slippers when out of bed   room organization consistent   safety round/check completed  Taken 10/18/2021 2200 by Roseanna Quezada RN  Safety Promotion/Fall Prevention:   activity supervised   fall prevention program maintained   nonskid shoes/slippers when out of bed   room organization consistent   safety round/check completed  Taken 10/18/2021 2000 by Roseanna Quezada RN  Safety Promotion/Fall Prevention:   activity supervised   fall prevention program maintained   nonskid shoes/slippers when out of bed   room organization consistent   safety round/check completed  Intervention: Prevent Skin Injury  Recent Flowsheet Documentation  Taken 10/19/2021 0200 by Roseanna Quezada RN  Body Position:   weight shift assistance provided   turned  Skin Protection:   adhesive use limited   incontinence pads utilized   tubing/devices free from skin contact   transparent dressing maintained   skin-to-skin areas padded   skin-to-device areas padded  Taken  10/19/2021 0000 by Roseanna Quezada RN  Body Position: weight shift assistance provided  Skin Protection:   adhesive use limited   tubing/devices free from skin contact   transparent dressing maintained   skin-to-device areas padded   skin-to-skin areas padded   skin sealant/moisture barrier applied  Taken 10/18/2021 2200 by Roseanna Quezada RN  Body Position: weight shift assistance provided  Skin Protection:   adhesive use limited   incontinence pads utilized   tubing/devices free from skin contact   transparent dressing maintained   skin-to-skin areas padded   skin-to-device areas padded   skin sealant/moisture barrier applied  Taken 10/18/2021 2000 by Roseanna Quezada RN  Body Position: weight shift assistance provided  Skin Protection:   adhesive use limited   incontinence pads utilized   tubing/devices free from skin contact   transparent dressing maintained   skin-to-skin areas padded   skin-to-device areas padded   skin sealant/moisture barrier applied  Intervention: Prevent and Manage VTE (venous thromboembolism) Risk  Recent Flowsheet Documentation  Taken 10/18/2021 2000 by Roseanna Quezada RN  VTE Prevention/Management:   bilateral   dorsiflexion/plantar flexion performed   bleeding risk factor(s) identified  Intervention: Prevent Infection  Recent Flowsheet Documentation  Taken 10/19/2021 0200 by Roseanna Quezada RN  Infection Prevention:   rest/sleep promoted   single patient room provided  Taken 10/19/2021 0000 by Roseanna Quezada RN  Infection Prevention:   rest/sleep promoted   single patient room provided  Taken 10/18/2021 2200 by Roseanna Quezada RN  Infection Prevention:   rest/sleep promoted   single patient room provided  Taken 10/18/2021 2000 by Roseanna Quezada RN  Infection Prevention:   rest/sleep promoted   single patient room provided  Goal: Optimal Comfort and Wellbeing  Outcome: Ongoing, Progressing  Intervention: Provide Person-Centered Care  Recent Flowsheet  Documentation  Taken 10/18/2021 2000 by Roseanna Quezada RN  Trust Relationship/Rapport:   care explained   questions answered   questions encouraged   thoughts/feelings acknowledged  Goal: Readiness for Transition of Care  Outcome: Ongoing, Progressing     Problem: Fall Injury Risk  Goal: Absence of Fall and Fall-Related Injury  Outcome: Ongoing, Progressing  Intervention: Identify and Manage Contributors to Fall Injury Risk  Recent Flowsheet Documentation  Taken 10/19/2021 0200 by Roseanna Quezada RN  Medication Review/Management: medications reviewed  Taken 10/19/2021 0000 by Roseanna Quezada RN  Medication Review/Management: medications reviewed  Taken 10/18/2021 2200 by Roseanna Quezada RN  Medication Review/Management: medications reviewed  Taken 10/18/2021 2000 by Roseanna Quezada RN  Medication Review/Management: medications reviewed  Intervention: Promote Injury-Free Environment  Recent Flowsheet Documentation  Taken 10/19/2021 0200 by Roseanna Quezada RN  Safety Promotion/Fall Prevention:   activity supervised   fall prevention program maintained   nonskid shoes/slippers when out of bed   room organization consistent   toileting scheduled  Taken 10/19/2021 0000 by Roseanna Quezada RN  Safety Promotion/Fall Prevention:   activity supervised   fall prevention program maintained   nonskid shoes/slippers when out of bed   room organization consistent   safety round/check completed  Taken 10/18/2021 2200 by Roseanna Quezada RN  Safety Promotion/Fall Prevention:   activity supervised   fall prevention program maintained   nonskid shoes/slippers when out of bed   room organization consistent   safety round/check completed  Taken 10/18/2021 2000 by Roseanna Quezada RN  Safety Promotion/Fall Prevention:   activity supervised   fall prevention program maintained   nonskid shoes/slippers when out of bed   room organization consistent   safety round/check completed     Problem: Skin Injury Risk  Increased  Goal: Skin Health and Integrity  Outcome: Ongoing, Progressing  Intervention: Optimize Skin Protection  Recent Flowsheet Documentation  Taken 10/19/2021 0200 by Roseanna Quezada RN  Pressure Reduction Techniques:   weight shift assistance provided   positioned off wounds   pressure points protected  Head of Bed (HOB): [facility] elevated  Pressure Reduction Devices: specialty bed utilized  Skin Protection:   adhesive use limited   incontinence pads utilized   tubing/devices free from skin contact   transparent dressing maintained   skin-to-skin areas padded   skin-to-device areas padded  Taken 10/19/2021 0000 by Roseanna Quezada RN  Pressure Reduction Techniques:   weight shift assistance provided   positioned off wounds   pressure points protected  Head of Bed (HOB): [facility] elevated  Pressure Reduction Devices: specialty bed utilized  Skin Protection:   adhesive use limited   tubing/devices free from skin contact   transparent dressing maintained   skin-to-device areas padded   skin-to-skin areas padded   skin sealant/moisture barrier applied  Taken 10/18/2021 2200 by Roseanna Quezada RN  Pressure Reduction Techniques:   weight shift assistance provided   pressure points protected   positioned off wounds  Head of Bed (HOB): [facility] elevated  Pressure Reduction Devices: specialty bed utilized  Skin Protection:   adhesive use limited   incontinence pads utilized   tubing/devices free from skin contact   transparent dressing maintained   skin-to-skin areas padded   skin-to-device areas padded   skin sealant/moisture barrier applied  Taken 10/18/2021 2000 by Roseanna Quezada RN  Pressure Reduction Techniques:   weight shift assistance provided   pressure points protected   positioned off wounds  Head of Bed (HOB): [facility] elevated  Pressure Reduction Devices: specialty bed utilized  Skin Protection:   adhesive use limited   incontinence pads utilized   tubing/devices free from skin contact   transparent dressing  maintained   skin-to-skin areas padded   skin-to-device areas padded   skin sealant/moisture barrier applied   Goal Outcome Evaluation:  Plan of Care Reviewed With: patient        Progress: no change  Outcome Summary: Patient disorientated to person, place and time. Q2hr turn with spec mattress in place. IV fluids currently infusing. Will continue plan of care.

## 2021-10-19 NOTE — PLAN OF CARE
Problem: Adult Inpatient Plan of Care  Goal: Plan of Care Review  Outcome: Ongoing, Progressing  Flowsheets (Taken 10/19/2021 1841)  Progress: improving  Plan of Care Reviewed With: patient  Outcome Summary: A&O to self only. VSS, does not express/ show signs of pain. Pt ate most of lunch and dinner tray. Unable to tolerate heel boots today as she pulls at them and moves around in the bed frequently.  Goal: Patient-Specific Goal (Individualized)  Outcome: Ongoing, Progressing  Goal: Absence of Hospital-Acquired Illness or Injury  Outcome: Ongoing, Progressing  Intervention: Identify and Manage Fall Risk  Recent Flowsheet Documentation  Taken 10/19/2021 1800 by Kathie Llanos RN  Safety Promotion/Fall Prevention:   fall prevention program maintained   nonskid shoes/slippers when out of bed   safety round/check completed  Taken 10/19/2021 1600 by Kathie Llanos RN  Safety Promotion/Fall Prevention:   activity supervised   fall prevention program maintained   nonskid shoes/slippers when out of bed   safety round/check completed  Taken 10/19/2021 1400 by Kathie Llanos RN  Safety Promotion/Fall Prevention:   activity supervised   fall prevention program maintained   nonskid shoes/slippers when out of bed   safety round/check completed  Taken 10/19/2021 1200 by Kathie Llanos RN  Safety Promotion/Fall Prevention:   activity supervised   fall prevention program maintained   nonskid shoes/slippers when out of bed   safety round/check completed  Taken 10/19/2021 0800 by Kathie Llanos RN  Safety Promotion/Fall Prevention:   activity supervised   fall prevention program maintained   nonskid shoes/slippers when out of bed   safety round/check completed  Intervention: Prevent Skin Injury  Recent Flowsheet Documentation  Taken 10/19/2021 1800 by Kathie Llanos RN  Body Position:   side-lying, left   weight shift assistance provided  Skin Protection:   incontinence pads utilized   adhesive use limited   skin sealant/moisture  barrier applied  Taken 10/19/2021 1600 by Kathie Llanos RN  Skin Protection:   incontinence pads utilized   adhesive use limited   skin sealant/moisture barrier applied  Taken 10/19/2021 1400 by Kathie Llanos RN  Body Position: sitting up in bed  Skin Protection:   skin sealant/moisture barrier applied   incontinence pads utilized   adhesive use limited  Taken 10/19/2021 1200 by Kathie Llanos RN  Body Position: sitting up in bed  Skin Protection:   incontinence pads utilized   adhesive use limited   skin sealant/moisture barrier applied  Taken 10/19/2021 1000 by Kathie Llanos RN  Skin Protection:   incontinence pads utilized   adhesive use limited   skin sealant/moisture barrier applied  Taken 10/19/2021 0800 by Kathie Llanso RN  Body Position: side-lying, right  Skin Protection:   adhesive use limited   skin sealant/moisture barrier applied   incontinence pads utilized  Goal: Optimal Comfort and Wellbeing  Outcome: Ongoing, Progressing  Goal: Readiness for Transition of Care  Outcome: Ongoing, Progressing   Goal Outcome Evaluation:  Plan of Care Reviewed With: patient        Progress: improving  Outcome Summary: A&O to self only. VSS, does not express/ show signs of pain. Pt ate most of lunch and dinner tray. Unable to tolerate heel boots today as she pulls at them and moves around in the bed frequently.

## 2021-10-19 NOTE — CASE MANAGEMENT/SOCIAL WORK
Continued Stay Note  [facility]     Patient Name: Mary Ann Cooper  MRN: [id number]  Today's Date: 10/19/2021    Admit Date: 10/15/2021     Discharge Plan     Row Name 10/19/21 1633       Plan    Plan [facility], skilled    Plan Comments CM received call from [facility] and they will accept pt to skilled bed with potential to transition to long term care if needed when medically ready. CM attempted to reach son Willie via phone and left message with no return call at this time to update on discharge plans. CM also attempted to call step daughter Steph with no answer and requested a return call. CM has not witnessed family at bedside today. Pt will need ambulance at time of discharge and repeat covid test. CM will continue to follow.    Final Discharge Disposition Code 03 - skilled nursing facility (SNF)               Discharge Codes    No documentation.                     Vandana Reynolds RN

## 2021-10-19 NOTE — PROGRESS NOTES
Clinical Nutrition   Reason For Visit: follow up    Patient Name: Mary Ann Cooper  YOB: 1930  MRN: [id number]  Date of Encounter: 10/19/21 15:24 EDT  Admission date: 10/15/2021    Nutrition Assessment   Encephalopathy  Dementia   Atrial fibrillation   Severe Malnutrition  Acute cystitis  Enterobacter cloacae UTI  ? Bladder mass      PMH: She  has a past medical history of Cancer (HCC), Disease of thyroid gland, and Hypertension.CBP, Breast CA   PSxH: She  has a past surgical history that includes Breast surgery and Hysterectomy.        Reported/Observed/Food/Nutrition Related History     Pt resting in bed, on room air, confused, unable to answer questions    Per RN: pt is swallowing ok, did not eat breakfast, but tech documented that pt ate 100% of lunch    Anthropometrics   Height: 69in per dtr  Weight: 92lb per bed wt on 10/15  BMI: 13.6 based on ht given by dtr.  BMI classification: underwt; <18.5  IBW: 145lb    UBW: ?  Weight change: dtr reports unintended wt loss over the past 3 months prior to adm; unknown amount         GI: wnl    SKIN:bruised      Labs reviewed   Labs reviewed: Yes    Results from last 7 days   Lab Units 10/17/21  0936 10/16/21  1526 10/16/21  0255 10/15/21  1543 10/15/21  1543   SODIUM mmol/L 139  --  143  --  141   POTASSIUM mmol/L 3.8 4.2 3.8   < > 3.2*   CHLORIDE mmol/L 104  --  105  --  103   CO2 mmol/L 24.0  --  28.0  --  29.0   BUN mg/dL 14  --  20  --  22   CREATININE mg/dL 0.61  --  0.79  --  0.93   GLUCOSE mg/dL 86  --  90  --  135*   CALCIUM mg/dL 9.3  --  9.4  --  9.4   MAGNESIUM mg/dL  --   --  2.3  --   --     < > = values in this interval not displayed.     Results from last 7 days   Lab Units 10/16/21  0255 10/15/21  1543   WBC 10*3/mm3 7.39 6.57   ALBUMIN g/dL  --  3.70         No results found for: HGBA1C  Medications reviewed   Medications reviewed: Yes  Abx, heparin, remeron, LR@75ml/hr      Current Nutrition Prescription   PO: Diet Soft Texture; Ground;  Thin  Oral Nutrition Supplement: Boost plus 3x/day    Intake: 33% of 6 meals    Nutrition Diagnosis   10/16. 10-19  Problem Inadequate oral intake   Etiology Dementia   Sign/Symptoms: 33% intake      10/17  Problem malnutrition   Etiology Poor po intake   Signs/Symptoms Muscle and fat losses per NFPE 10/17       Intervention   Intervention: Follow treatment progress, Interview for preferences, Menu provided, Encourage intake     Goal:   General: Nutrition to support treatment  PO: increase intake    Palliative Care consulted for GOC    Monitoring/Evaluation:   Monitoring/Evaluation: Per protocol       Vandana Akhtar RD  Time Spent: 30mins

## 2021-10-19 NOTE — PLAN OF CARE
Goal Outcome Evaluation:  Plan of Care Reviewed With: other (see comments) (nursing)     Outcome Summary: New palliative consult for assistance with goals of care.  CAROLINA Bernard to see pt today.  Pt. appeared to be sleeping comfortably at time of visit.  No symptoms nor issues per nursing.  Palliative care continuing to follow for support POC and symptom management.        1300 Palliative Team Conference:  SILVINA Solano DO ; KARTHIKEYAN Shea RN, CHPN; CAROLINA Bernard; FRANCISCA Enriquez RN, MANUEL; JACOB Mcgowan; CAROLINA John; ENDY Rosen RN        Problem: Palliative Care  Goal: Enhanced Quality of Life  Intervention: Promote Advance Care Planning  Flowsheets (Taken 10/19/2021 1207)  Life Transition/Adjustment: (spoke with nursing)   palliative care initiated   palliative care discussed

## 2021-10-20 NOTE — PROGRESS NOTES
[facility] Medicine Services  PROGRESS NOTE    Patient Name: Mary Ann Cooper  : 3/31/1930  MRN: [id number]    Date of Admission: 10/15/2021  Primary Care Physician: Christi Gold MD    Subjective   Subjective     CC:  Follow-up UTI    HPI:  Awake and alert, talking but confused.    ROS:  Limited    Objective   Objective     Vital Signs:   Temp:  [97.7 °F (36.5 °C)-98.3 °F (36.8 °C)] 97.9 °F (36.6 °C)  Heart Rate:  [] 86  Resp:  [14-16] 16  BP: ()/(58-88) 137/86     Physical Exam:  NAD, alert  OP clear, MMM  PERRL  Neck supple  RRR  CTAB  +BS, ND, NT, soft  Cachexia  BOOKER  No rashes  Talking today, more conversant but confused  Otherwise no change from 10/19    Results Reviewed:  LAB RESULTS:      Lab 10/16/21  0255 10/15/21  1543   WBC 7.39 6.57   HEMOGLOBIN 15.2 15.6   HEMATOCRIT 44.2 45.3   PLATELETS 296 305   NEUTROS ABS 4.26 3.88   IMMATURE GRANS (ABS) 0.02 0.02   LYMPHS ABS 1.83 1.55   MONOS ABS 0.83 0.72   EOS ABS 0.39 0.34   MCV 88.9 89.5         Lab 10/19/21  2009 10/17/21  0936 10/16/21  1526 10/16/21  0255 10/15/21  1543   SODIUM  --  139  --  143 141   POTASSIUM 3.4* 3.8 4.2 3.8 3.2*   CHLORIDE  --  104  --  105 103   CO2  --  24.0  --  28.0 29.0   ANION GAP  --  11.0  --  10.0 9.0   BUN  --  14  --  20 22   CREATININE  --  0.61  --  0.79 0.93   GLUCOSE  --  86  --  90 135*   CALCIUM  --  9.3  --  9.4 9.4   MAGNESIUM 1.8  --   --  2.3  --    TSH  --   --   --  7.370*  --          Lab 10/15/21  1543   TOTAL PROTEIN 6.6   ALBUMIN 3.70   GLOBULIN 2.9   ALT (SGPT) 21   AST (SGOT) 30   BILIRUBIN 0.5   ALK PHOS 258*   LIPASE 69*         Lab 10/19/21  2009 10/15/21  1543   PROBNP  --  989.9   TROPONIN T <0.010 <0.010                 Brief Urine Lab Results  (Last result in the past 365 days)      Color   Clarity   Blood   Leuk Est   Nitrite   Protein   CREAT   Urine HCG        10/15/21 1544 Yellow   Clear   Trace   Trace   Positive   100 mg/dL (2+)                  Microbiology Results Abnormal     Procedure Component Value - Date/Time    Gastrointestinal Panel, PCR - Stool, Per Rectum [[id number]]  (Normal) Collected: 10/16/21 2027    Lab Status: Final result Specimen: Stool from Per Rectum Updated: 10/16/21 2153     Campylobacter Not Detected     Plesiomonas shigelloides Not Detected     Salmonella Not Detected     Vibrio Not Detected     Vibrio cholerae Not Detected     Yersinia enterocolitica Not Detected     Enteroaggregative E. coli (EAEC) Not Detected     Enteropathogenic E. coli (EPEC) Not Detected     Enterotoxigenic E. coli (ETEC) lt/st Not Detected     Shiga-like toxin-producing E. coli (STEC) stx1/stx2 Not Detected     Shigella/Enteroinvasive E. coli (EIEC) Not Detected     Cryptosporidium Not Detected     Cyclospora cayetanensis Not Detected     Entamoeba histolytica Not Detected     Giardia lamblia Not Detected     Adenovirus F40/41 Not Detected     Astrovirus Not Detected     Norovirus GI/GII Not Detected     Rotavirus A Not Detected     Sapovirus (I, II, IV or V) Not Detected    Clostridium Difficile Toxin - Stool, Per Rectum [[id number]]  (Normal) Collected: 10/16/21 2027    Lab Status: Final result Specimen: Stool from Per Rectum Updated: 10/16/21 2125    Narrative:      The following orders were created for panel order Clostridium Difficile Toxin - Stool, Per Rectum.  Procedure                               Abnormality         Status                     ---------                               -----------         ------                     Clostridium Difficile To...[[id number]]  Normal              Final result                 Please view results for these tests on the individual orders.    Clostridium Difficile Toxin, PCR - Stool, Per Rectum [[id number]]  (Normal) Collected: 10/16/21 2027    Lab Status: Final result Specimen: Stool from Per Rectum Updated: 10/16/21 2125     C. Difficile Toxins by PCR Not Detected    Narrative:      Performance  characteristics of test not established for patients <2 years of age.  Negative for Toxigenic C. Difficile    COVID PRE-OP / PRE-PROCEDURE SCREENING ORDER (NO ISOLATION) - Swab, Nasopharynx [[id number]]  (Normal) Collected: 10/15/21 1737    Lab Status: Final result Specimen: Swab from Nasopharynx Updated: 10/15/21 1807    Narrative:      The following orders were created for panel order COVID PRE-OP / PRE-PROCEDURE SCREENING ORDER (NO ISOLATION) - Swab, Nasopharynx.  Procedure                               Abnormality         Status                     ---------                               -----------         ------                     COVID-19 and FLU A/B PCR...[[id number]]  Normal              Final result                 Please view results for these tests on the individual orders.    COVID-19 and FLU A/B PCR - Swab, Nasopharynx [[id number]]  (Normal) Collected: 10/15/21 1737    Lab Status: Final result Specimen: Swab from Nasopharynx Updated: 10/15/21 1807     COVID19 Not Detected     Influenza A PCR Not Detected     Influenza B PCR Not Detected    Narrative:      Fact sheet for providers: https://www.fda.gov/media/599888/download    Fact sheet for patients: https://www.fda.gov/media/411905/download    Test performed by PCR.          No radiology results from the last 24 hrs        I have reviewed the medications:  Scheduled Meds:heparin (porcine), 5,000 Units, Subcutaneous, Q12H  levothyroxine, 75 mcg, Oral, Q AM  metoprolol tartrate, 25 mg, Oral, Q12H  mirtazapine, 15 mg, Oral, Nightly      Continuous Infusions:lactated ringers, 75 mL/hr, Last Rate: 75 mL/hr (10/19/21 9883)      PRN Meds:.•  acetaminophen **OR** acetaminophen **OR** acetaminophen  •  magnesium sulfate **OR** magnesium sulfate **OR** magnesium sulfate  •  melatonin  •  potassium chloride **OR** potassium chloride **OR** potassium chloride  •  sodium chloride  •  [COMPLETED] Insert peripheral IV **AND** sodium chloride    Assessment/Plan    Assessment & Plan     Active Hospital Problems    Diagnosis  POA   • **Acute cystitis without hematuria [N30.00]  Yes   • Severe malnutrition (CMS/HCC) [E43]  Yes   • Dementia (HCC) [F03.90]  Yes   • Hypothyroidism [E03.9]  Yes   • History of breast cancer [Z85.3]  Not Applicable   • Essential hypertension [I10]  Yes   • Chronic back pain [M54.9, G89.29]  Yes   • Atrial fibrillation (HCC) [I48.91]  Unknown      Resolved Hospital Problems   No resolved problems to display.        Brief Hospital Course to date:  Mary Ann Cooper is a 89 y.o. female nursing home resident with Hx of breast cancer, A. fib, dementia recently undergoing kyphoplasty 1 month ago at [facility] who has brought to the hospital for evaluation of diarrhea and confusion above baseline; she has been treated with IV antibiotics for presumed urinary infection with stable vital/labs, however she has had minimal oral intake and is minimally interactive    Assessment/plan    Enterobacter cloacae UTI  --completed rocephin    Asymmetric bladder wall thickening  --unclear significance  --possibly due to UTI, outpatient follow up with urology prn  -Unclear clinical significance at this time in the setting of presumed acute cystitis; completed antibiotics  -Seen by urology, recommend treating UTI and can follow-up outpatient as necessary    Acute metabolic encephalopathy  Baseline dementia  Failure to thrive in the adult  --on low dose remeron, team d/w family, needs placement, palliative care assistance    Acute diarrhea, improved  -No further diarrhea reported since admission    Presumed new onset atrial fibrillation  --no prior history  --no AC per family request  --on BB    Hypothyroidism  -Continue Synthroid    Hx of breast cancer, remote  -S/p BL mastectomy with reconstruction in 1989  -Per documentation recently had LT breast fluid collection evaluated at [facility] that could not be drained    Chronic back pain  -S/p kyphoplasty at [facility]  9/2021      DVT prophylaxis:  Medical and mechanical DVT prophylaxis orders are present.       AM-PAC 6 Clicks Score (PT): 8 (10/19/21 0800)    Disposition: Needs placement, palliative care assistance.    CODE STATUS:   Code Status and Medical Interventions:   Ordered at: 10/15/21 1946     Limited Support to NOT Include:    Intubation     Level Of Support Discussed With:    Health Care Surrogate     Code Status:    No CPR     Medical Interventions (Level of Support Prior to Arrest):    Limited       Ozzie Souza MD  10/20/21

## 2021-10-20 NOTE — PLAN OF CARE
Goal Outcome Evaluation:  Plan of Care Reviewed With: patient        Progress: no change   Pt disoriented x4, RA. Pt started shift in a-fib, pt then had intermittent episodes of SVT. EKG preformed. APRN notified. Mag, potassium replaced. Pt has sustained in a-fib remainder of shift. Skin interventions in place. Pt currently resting in bed. Will continue plan of care.

## 2021-10-20 NOTE — PROGRESS NOTES
Spoke to stepdaughter, Setph Espino, over the phone.   Updated on current clinical status with expected outcomes of continued functional decline, informed about choices with plans of care.  Patient's son, Willie, defers to Steph to assist with managing healthcare decisions.  Steph requests to have further information about hospice services.   Requested referral for hospice.   Nayeli Vega APRN  [phone]

## 2021-10-20 NOTE — THERAPY TREATMENT NOTE
Patient Name: Mary Ann Cooper  : 3/31/1930    MRN: [id number]                              Today's Date: 10/20/2021       Admit Date: 10/15/2021    Visit Dx:     ICD-10-CM ICD-9-CM   1. Acute cystitis without hematuria  N30.00 595.0   2. Bladder mass  N32.89 596.89   3. Confusion  R41.0 298.9   4. Dementia without behavioral disturbance, unspecified dementia type (HCC)  F03.90 294.20   5. Atrial fibrillation, unspecified type (HCC)  I48.91 427.31     Patient Active Problem List   Diagnosis   • Acute cystitis without hematuria   • Dementia (HCC)   • Hypothyroidism   • History of breast cancer   • Essential hypertension   • Chronic back pain   • Atrial fibrillation (HCC)   • Severe malnutrition (CMS/HCC)     Past Medical History:   Diagnosis Date   • Cancer (HCC)    • Disease of thyroid gland    • Hypertension      Past Surgical History:   Procedure Laterality Date   • BREAST SURGERY     • HYSTERECTOMY        General Information     Row Name 10/20/21 1133          Physical Therapy Time and Intention    Document Type therapy note (daily note)  -KG     Mode of Treatment physical therapy  -KG     Row Name 10/20/21 1133          General Information    Existing Precautions/Restrictions fall; other (see comments)  baseline dementia  -KG     Barriers to Rehab medically complex; previous functional deficit; cognitive status  -KG     Row Name 10/20/21 1133          Cognition    Orientation Status (Cognition) disoriented to; person; place; situation  -KG     Row Name 10/20/21 1133          Safety Issues, Functional Mobility    Safety Issues Affecting Function (Mobility) ability to follow commands; awareness of need for assistance; insight into deficits/self-awareness; safety precaution awareness; safety precautions follow-through/compliance  -KG     Impairments Affecting Function (Mobility) cognition; endurance/activity tolerance; range of motion (ROM); strength  -KG     Cognitive Impairments, Mobility Safety/Performance  attention; awareness, need for assistance; insight into deficits/self-awareness; safety precaution awareness; safety precaution follow-through  -KG           User Key  (r) = Recorded By, (t) = Taken By, (c) = Cosigned By    Initials Name Provider Type    Pam Ware PT Physical Therapist               Mobility     Row Name 10/20/21 1134          Bed Mobility    Comment (Bed Mobility) Pt refused all bed mobility this date despite increased encouragement. Pt resistant to all movement.  -KG     Row Name 10/20/21 1134          Transfers    Comment (Transfers) Deferred any OOB mobility this date; not appropriate to assess.  -KG     Row Name 10/20/21 1134          Bed-Chair Transfer    Bed-Chair Shepherd (Transfers) unable to assess  -KG     Row Name 10/20/21 1134          Sit-Stand Transfer    Sit-Stand Shepherd (Transfers) unable to assess  -KG     Row Name 10/20/21 1134          Gait/Stairs (Locomotion)    Shepherd Level (Gait) unable to assess  -KG     Comment (Gait/Stairs) Ambulation deferred. Not appropriate to assess.  -KG           User Key  (r) = Recorded By, (t) = Taken By, (c) = Cosigned By    Initials Name Provider Type    Pam Ware PT Physical Therapist               Obj/Interventions     Row Name 10/20/21 1135          Motor Skills    Therapeutic Exercise hip; knee; ankle  pt unable to actively participate; pt very resistant to movement  -KG     Row Name 10/20/21 1135          Hip (Therapeutic Exercise)    Hip (Therapeutic Exercise) strengthening exercise  -KG     Hip Strengthening (Therapeutic Exercise) bilateral; flexion; extension; aBduction; aDduction; external rotation; internal rotation; heel slides; supine; 10 repetitions  -KG     Row Name 10/20/21 1135          Knee (Therapeutic Exercise)    Knee (Therapeutic Exercise) strengthening exercise  -KG     Knee Strengthening (Therapeutic Exercise) bilateral; flexion; extension; SLR (straight leg raise); SAQ (short  arc quad); supine; 10 repetitions  -KG     Row Name 10/20/21 1135          Ankle (Therapeutic Exercise)    Ankle (Therapeutic Exercise) strengthening exercise  -KG     Ankle Strengthening (Therapeutic Exercise) bilateral; dorsiflexion; plantarflexion; supine; 10 repetitions  -KG     Row Name 10/20/21 1135          Elbow/Forearm (Therapeutic Exercise)    Elbow/Forearm (Therapeutic Exercise) strengthening exercise  -KG     Elbow/Forearm Strengthening (Therapeutic Exercise) bilateral; flexion; extension; supine; 10 repetitions  -KG     Row Name 10/20/21 1135          Wrist (Therapeutic Exercise)    Wrist (Therapeutic Exercise) strengthening exercise  -KG     Wrist Strengthening (Therapeutic Exercise) bilateral; flexion; extension; 10 repetitions  -KG           User Key  (r) = Recorded By, (t) = Taken By, (c) = Cosigned By    Initials Name Provider Type    KG Pam Nunez, PT Physical Therapist               Goals/Plan    No documentation.                Clinical Impression     Row Name 10/20/21 1132          Pain    Additional Documentation Pain Scale: FACES Pre/Post-Treatment (Group)  -KG     Row Name 10/20/21 1138          Pain Scale: FACES Pre/Post-Treatment    Pain: FACES Scale, Pretreatment 0-->no hurt  -KG     Posttreatment Pain Rating 0-->no hurt  -KG     Row Name 10/20/21 1135          Plan of Care Review    Plan of Care Reviewed With patient  -KG     Progress no change  -KG     Outcome Summary Pt refused all bed mobility this date despite increased encouragement. Transfers not appropriate to assess. Pt tolerated bed level ther ex, but was unable to actively participate. Pt very resistant to movement. Continue to progress as appropriate.  -KG     Row Name 10/20/21 1131          Vital Signs    Pre Systolic BP Rehab 137  -KG     Pre Treatment Diastolic BP 86  -KG     Pretreatment Heart Rate (beats/min) 65  -KG     Posttreatment Heart Rate (beats/min) 66  -KG     Pre SpO2 (%) 94  -KG     O2 Delivery Pre  Treatment room air  -KG     Post SpO2 (%) 95  -KG     O2 Delivery Post Treatment room air  -KG     Pre Patient Position Side Lying  -KG     Intra Patient Position Supine  -KG     Post Patient Position Side Lying  -KG     Row Name 10/20/21 1137          Positioning and Restraints    In Bed notified nsg; side lying left; call light within reach; encouraged to call for assist; exit alarm on; side rails up x3; pillow between legs  -KG           User Key  (r) = Recorded By, (t) = Taken By, (c) = Cosigned By    Initials Name Provider Type    Pam Ware, PT Physical Therapist               Outcome Measures     Row Name 10/20/21 1139          How much help from another person do you currently need...    Turning from your back to your side while in flat bed without using bedrails? 2  -KG     Moving from lying on back to sitting on the side of a flat bed without bedrails? 1  -KG     Moving to and from a bed to a chair (including a wheelchair)? 1  -KG     Standing up from a chair using your arms (e.g., wheelchair, bedside chair)? 1  -KG     Climbing 3-5 steps with a railing? 1  -KG     To walk in hospital room? 1  -KG     AM-PAC 6 Clicks Score (PT) 7  -KG     Row Name 10/20/21 1139          Functional Assessment    Outcome Measure Options AM-PAC 6 Clicks Basic Mobility (PT)  -KG           User Key  (r) = Recorded By, (t) = Taken By, (c) = Cosigned By    Initials Name Provider Type    Pam Ware PT Physical Therapist                             Physical Therapy Education                 Title: PT OT SLP Therapies (In Progress)     Topic: Physical Therapy (In Progress)     Point: Mobility training (In Progress)     Learning Progress Summary           Patient Acceptance, E, NR by KG at 10/20/2021 1111    Acceptance, E, NR by KG at 10/18/2021 1312    Nonacceptance, E, NR by KM at 10/16/2021 1143    Comment: pt with AMS, difficult to assess                   Point: Home exercise program (In Progress)      Learning Progress Summary           Patient Acceptance, E, NR by KG at 10/20/2021 1111    Acceptance, E, NR by KG at 10/18/2021 1312    Nonacceptance, E, NR by KM at 10/16/2021 1143    Comment: pt with AMS, difficult to assess                   Point: Body mechanics (In Progress)     Learning Progress Summary           Patient Acceptance, E, NR by KG at 10/20/2021 1111    Acceptance, E, NR by KG at 10/18/2021 1312    Nonacceptance, E, NR by KM at 10/16/2021 1143    Comment: pt with AMS, difficult to assess                   Point: Precautions (In Progress)     Learning Progress Summary           Patient Acceptance, E, NR by KG at 10/20/2021 1111    Acceptance, E, NR by KG at 10/18/2021 1312    Nonacceptance, E, NR by KM at 10/16/2021 1143    Comment: pt with AMS, difficult to assess                               User Key     Initials Effective Dates Name Provider Type Discipline     06/16/21 -  Subha Patton, PT Physical Therapist PT     05/22/20 -  Pam Nunez, PT Physical Therapist PT              PT Recommendation and Plan     Plan of Care Reviewed With: patient  Progress: no change  Outcome Summary: Pt refused all bed mobility this date despite increased encouragement. Transfers not appropriate to assess. Pt tolerated bed level ther ex, but was unable to actively participate. Pt very resistant to movement. Continue to progress as appropriate.     Time Calculation:    PT Charges     Row Name 10/20/21 1111             Time Calculation    Start Time 1111  -KG      PT Received On 10/20/21  -KG      PT Goal Re-Cert Due Date 10/26/21  -KG              Time Calculation- PT    Total Timed Code Minutes- PT 10 minute(s)  -KG              Timed Charges    62919 - PT Therapeutic Exercise Minutes 10  -KG              Total Minutes    Timed Charges Total Minutes 10  -KG       Total Minutes 10  -KG            User Key  (r) = Recorded By, (t) = Taken By, (c) = Cosigned By    Initials Name Provider Type     KG Pam Nunez, PT Physical Therapist              Therapy Charges for Today     Code Description Service Date Service Provider Modifiers Qty    [id number] HC PT THER PROC EA 15 MIN 10/20/2021 Pam Nunez, PT GP 1          PT G-Codes  Outcome Measure Options: AM-PAC 6 Clicks Basic Mobility (PT)  AM-PAC 6 Clicks Score (PT): 7  AM-PAC 6 Clicks Score (OT): 6    Moriah Nunez PT  10/20/2021

## 2021-10-20 NOTE — CASE MANAGEMENT/SOCIAL WORK
Continued Stay Note  [facility]     Patient Name: Mary Ann Cooper  MRN: [id number]  Today's Date: 10/20/2021    Admit Date: 10/15/2021     Discharge Plan     Row Name 10/20/21 0842       Plan    Plan Comments CM spoke with Dr. Souza and pt will lifely be ready for discharge to skilled bed at [facility] Thursday 10/21. CM spoke with step-daughter Steph Espino and updated on discharge plans with skilled bed offer at [facility] and she is agreeable with ambulance transport Thursday at 1130am. CM arranged [facility] ambulance for transport, PCS form on chart. CM left voicemail for Shannon at [facility] updated discharge plans and discharge date via ambulance. CM will continue to follow.    Final Discharge Disposition Code 03 - skilled nursing facility (SNF)               Discharge Codes    No documentation.                     Vandana Reynolds RN

## 2021-10-20 NOTE — THERAPY TREATMENT NOTE
Patient Name: Mary Ann Cooper  : 3/31/1930    MRN: [id number]                              Today's Date: 10/20/2021       Admit Date: 10/15/2021    Visit Dx:     ICD-10-CM ICD-9-CM   1. Acute cystitis without hematuria  N30.00 595.0   2. Bladder mass  N32.89 596.89   3. Confusion  R41.0 298.9   4. Dementia without behavioral disturbance, unspecified dementia type (HCC)  F03.90 294.20   5. Atrial fibrillation, unspecified type (HCC)  I48.91 427.31     Patient Active Problem List   Diagnosis   • Acute cystitis without hematuria   • Dementia (HCC)   • Hypothyroidism   • History of breast cancer   • Essential hypertension   • Chronic back pain   • Atrial fibrillation (HCC)   • Severe malnutrition (CMS/HCC)     Past Medical History:   Diagnosis Date   • Cancer (HCC)    • Disease of thyroid gland    • Hypertension      Past Surgical History:   Procedure Laterality Date   • BREAST SURGERY     • HYSTERECTOMY        General Information     Row Name 10/20/21 1422          OT Time and Intention    Document Type therapy note (daily note)  -TA     Mode of Treatment occupational therapy  -TA     Row Name 10/20/21 1422          General Information    Patient Profile Reviewed yes  -TA     Existing Precautions/Restrictions fall  dementia  -TA     Barriers to Rehab previous functional deficit; cognitive status  -TA     Row Name 10/20/21 1422          Cognition    Orientation Status (Cognition) oriented to; person; disoriented to; place; situation; time  -TA     Row Name 10/20/21 1422          Safety Issues, Functional Mobility    Safety Issues Affecting Function (Mobility) at risk behavior observed; impulsivity; insight into deficits/self-awareness; judgment; safety precautions follow-through/compliance  -TA     Impairments Affecting Function (Mobility) cognition; endurance/activity tolerance; strength; balance  -TA           User Key  (r) = Recorded By, (t) = Taken By, (c) = Cosigned By    Initials Name Provider Type    CRYSTAL Poe  Silas RODAS, OT Occupational Therapist                 Mobility/ADL's     Row Name 10/20/21 1422          Bed Mobility    Comment (Bed Mobility) Pt UIC  -TA     Row Name 10/20/21 1422          Activities of Daily Living    BADL Assessment/Intervention feeding; grooming  -TA     Row Name 10/20/21 1422          Grooming Assessment/Training    [place] Level (Grooming) wash face, hands; hair care, combing/brushing; maximum assist (25% patient effort); verbal cues; nonverbal cues (demo/gesture)  hand lotion application  -TA     Position (Grooming) supported sitting  -TA     Row Name 10/20/21 1422          Self-Feeding Assessment/Training    [place] Level (Feeding) liquids to mouth; finger foods; moderate assist (50% patient effort)  -TA     Position (Self-Feeding) supported sitting  -TA           User Key  (r) = Recorded By, (t) = Taken By, (c) = Cosigned By    Initials Name Provider Type    Silas Wong OT Occupational Therapist               Obj/Interventions     Row Name 10/20/21 1422          Balance    Balance Interventions occupation based/functional task  -TA           User Key  (r) = Recorded By, (t) = Taken By, (c) = Cosigned By    Initials Name Provider Type    Silas Wong OT Occupational Therapist               Goals/Plan     Row Name 10/20/21 1422          Therapy Assessment/Plan (OT)    Planned Therapy Interventions (OT) activity tolerance training; BADL retraining; functional balance retraining; occupation/activity based interventions; patient/caregiver education/training; ROM/therapeutic exercise; strengthening exercise; transfer/mobility retraining  -TA           User Key  (r) = Recorded By, (t) = Taken By, (c) = Cosigned By    Initials Name Provider Type    Silas Wong, OT Occupational Therapist               Clinical Impression     Row Name 10/20/21 1422          Pain Assessment    Additional Documentation Pain Scale: FACES Pre/Post-Treatment (Group)  -TA     Row  Name 10/20/21 1422          Pain Scale: FACES Pre/Post-Treatment    Pain: FACES Scale, Pretreatment 0-->no hurt  -TA     Posttreatment Pain Rating 0-->no hurt  -TA     Pre/Posttreatment Pain Comment Pt smiling verbalizing throughout tx  -TA     Row Name 10/20/21 1422          Plan of Care Review    Plan of Care Reviewed With patient  -TA     Progress improving  -TA     Outcome Summary VSS; Pt UIC with meal tray in front of her. Pt engaged in self feeding with Mod A for finger foods and to bring cup to mouth to drink. Pt required Max hand over hand assist for brushing hair, wash/dry hands/face and for hand lotion application. Continue per OT POC. Recommend LTC at discharge.  -TA     Row Name 10/20/21 1422          Therapy Assessment/Plan (OT)    Rehab Potential (OT) good, to achieve stated therapy goals  -TA     Criteria for Skilled Therapeutic Interventions Met (OT) yes; skilled treatment is necessary  -TA     Therapy Frequency (OT) daily  -TA     Predicted Duration of Therapy Intervention (OT) 3 days  -TA     Row Name 10/20/21 1422          Therapy Plan Review/Discharge Plan (OT)    Anticipated Discharge Disposition (OT) long-term acute care facility  -TA     Row Name 10/20/21 1422          Vital Signs    Pre Systolic BP Rehab --  VSS; RN cleared pt for tx  -TA     O2 Delivery Pre Treatment room air  -TA     O2 Delivery Intra Treatment room air  -TA     O2 Delivery Post Treatment room air  -TA     Pre Patient Position Sitting  -TA     Intra Patient Position Sitting  -TA     Post Patient Position Sitting  -TA     Row Name 10/20/21 1422          Positioning and Restraints    Pre-Treatment Position sitting in chair/recliner  -TA     Post Treatment Position chair  -TA     In Chair notified nsg; reclined; call light within reach; encouraged to call for assist; exit alarm on; patient within staff view; legs elevated  -TA           User Key  (r) = Recorded By, (t) = Taken By, (c) = Cosigned By    Initials Name Provider  Type    TA Silas Poe, OT Occupational Therapist               Outcome Measures     Row Name 10/20/21 1422          How much help from another is currently needed...    Putting on and taking off regular lower body clothing? 2  -TA     Bathing (including washing, rinsing, and drying) 2  -TA     Toileting (which includes using toilet bed pan or urinal) 1  -TA     Putting on and taking off regular upper body clothing 2  -TA     Taking care of personal grooming (such as brushing teeth) 2  -TA     Eating meals 2  -TA     AM-PAC 6 Clicks Score (OT) 11  -TA     Row Name 10/20/21 1139          How much help from another person do you currently need...    Turning from your back to your side while in flat bed without using bedrails? 2  -KG     Moving from lying on back to sitting on the side of a flat bed without bedrails? 1  -KG     Moving to and from a bed to a chair (including a wheelchair)? 1  -KG     Standing up from a chair using your arms (e.g., wheelchair, bedside chair)? 1  -KG     Climbing 3-5 steps with a railing? 1  -KG     To walk in hospital room? 1  -KG     AM-PAC 6 Clicks Score (PT) 7  -KG     Row Name 10/20/21 1139          Functional Assessment    Outcome Measure Options AM-PAC 6 Clicks Basic Mobility (PT)  -KG           User Key  (r) = Recorded By, (t) = Taken By, (c) = Cosigned By    Initials Name Provider Type    Silas Wong, OT Occupational Therapist    KG Pam Nunez, PT Physical Therapist                Occupational Therapy Education                 Title: PT OT SLP Therapies (In Progress)     Topic: Occupational Therapy (In Progress)     Point: ADL training (In Progress)     Description:   Instruct learner(s) on proper safety adaptation and remediation techniques during self care or transfers.   Instruct in proper use of assistive devices.              Learning Progress Summary           Patient Acceptance, E, NR by CRYSTAL at 10/17/2021 1017                   Point: Home  exercise program (In Progress)     Description:   Instruct learner(s) on appropriate technique for monitoring, assisting and/or progressing therapeutic exercises/activities.              Learning Progress Summary           Patient Acceptance, E, NR by TA at 10/17/2021 1017                   Point: Precautions (In Progress)     Description:   Instruct learner(s) on prescribed precautions during self-care and functional transfers.              Learning Progress Summary           Patient Acceptance, E, NR by TA at 10/17/2021 1017                   Point: Body mechanics (In Progress)     Description:   Instruct learner(s) on proper positioning and spine alignment during self-care, functional mobility activities and/or exercises.              Learning Progress Summary           Patient Acceptance, E, NR by TA at 10/17/2021 1017                               User Key     Initials Effective Dates Name Provider Type Discipline    TA 06/16/21 -  Silas Poe, OT Occupational Therapist OT              OT Recommendation and Plan  Planned Therapy Interventions (OT): activity tolerance training, BADL retraining, functional balance retraining, occupation/activity based interventions, patient/caregiver education/training, ROM/therapeutic exercise, strengthening exercise, transfer/mobility retraining  Therapy Frequency (OT): daily  Plan of Care Review  Plan of Care Reviewed With: patient  Progress: improving  Outcome Summary: VSS; Pt UIC with meal tray in front of her. Pt engaged in self feeding with Mod A for finger foods and to bring cup to mouth to drink. Pt required Max hand over hand assist for brushing hair, wash/dry hands/face and for hand lotion application. Continue per OT POC. Recommend LTC at discharge.     Time Calculation:    Time Calculation- OT     Row Name 10/20/21 1422             Time Calculation- OT    OT Start Time 1422  ttc 17 minutes  -TA      Total Timed Code Minutes- OT 17 minute(s)  -TA      OT  Received On 10/20/21  -CRYSTAL      OT Goal Re-Cert Due Date 10/27/21  -CRYSTAL            User Key  (r) = Recorded By, (t) = Taken By, (c) = Cosigned By    Initials Name Provider Type    Silas Wong OT Occupational Therapist              Therapy Charges for Today     Code Description Service Date Service Provider Modifiers Qty    [id number] HC OT SELF CARE/MGMT/TRAIN EA 15 MIN 10/20/2021 Silas Poe OT GO 1               Silas Poe OT  10/20/2021

## 2021-10-20 NOTE — PROGRESS NOTES
Clinical Nutrition     Patient Name: Mary Ann Cooper  Date of Encounter: 10/20/21 17:34 EDT  MRN: [id number]  Admission date: 10/15/2021    Pt sitting up in chair, pleasantly confused, persistently trying to get out of her chair, has not touched much of dinner, encouraged pt to eat several bites of sherbet    Current diet: Diet Soft Texture; Ground; Thin  Boost+ 3x/day    Intervention:  Follow treatment plan  Care plan reviewed    Follow up:   Per protocol      Vandana Akhtar RD  17:34 EDT  Time: 20min

## 2021-10-20 NOTE — PLAN OF CARE
Problem: Adult Inpatient Plan of Care  Goal: Plan of Care Review  Recent Flowsheet Documentation  Taken 10/20/2021 1422 by Silas Poe, OT  Progress: improving  Plan of Care Reviewed With: patient  Outcome Summary:   VSS   Pt UIC with meal tray in front of her. Pt engaged in self feeding with Mod A for finger foods and to bring cup to mouth to drink. Pt required Max hand over hand assist for brushing hair, wash/dry hands/face and for hand lotion application. Continue per OT POC. Recommend LTC at discharge.   Goal Outcome Evaluation:  Plan of Care Reviewed With: patient        Progress: improving  Outcome Summary: VSS; Pt UIC with meal tray in front of her. Pt engaged in self feeding with Mod A for finger foods and to bring cup to mouth to drink. Pt required Max hand over hand assist for brushing hair, wash/dry hands/face and for hand lotion application. Continue per OT POC. Recommend LTC at discharge.

## 2021-10-20 NOTE — PLAN OF CARE
Goal Outcome Evaluation:  Plan of Care Reviewed With: other (see comments) (spoke with nursing)       Outcome Summary: Pt. appeared to be sleeping comfortably in bed at time of visit.  Pt. has even less of an appetite today only eating a bite of applesauce with meds and is more lethargic today per nursing.  Pt. to d/c back to [facility] tomorrow.  Palliative following for POC and symptom management.      1300 Palliative Team Conference:  KARTHIKEYAN Shea RN, LIZZETTE; NAYE Vega APRN; FRANCISCA Enriquez RN, LIZZETTE;  DANTE Renee, [facility]; MORRIS Alejandro, W; MORRIS Yen RN  Problem: Palliative Care  Goal: Enhanced Quality of Life  Outcome: Ongoing, Progressing  Intervention: Promote Advance Care Planning  Flowsheets (Taken 10/20/2021 1425)  Life Transition/Adjustment: (with nursing) palliative care discussed

## 2021-10-20 NOTE — PLAN OF CARE
Goal Outcome Evaluation:  Plan of Care Reviewed With: patient        Progress: no change  Outcome Summary: Pt refused all bed mobility this date despite increased encouragement. Transfers not appropriate to assess. Pt tolerated bed level ther ex, but was unable to actively participate. Pt very resistant to movement. Continue to progress as appropriate.

## 2021-10-21 NOTE — CASE MANAGEMENT/SOCIAL WORK
Continued Stay Note  [facility]     Patient Name: Mary Ann Cooper  MRN: [id number]  Today's Date: 10/21/2021    Admit Date: 10/15/2021     Discharge Plan     Row Name 10/21/21 0853       Plan    Plan Comments CM received update from CHERYL Cardenas and pt is not medically ready for discharge today related to SVT. CM cancelled ambulance transport and rescheduled with [facility] ambulance for Friday 10/22 @ 1415. CM has left message with Shannon at [facility] leaving an update and requeting return call. CM will continue to follow.    Final Discharge Disposition Code 03 - skilled nursing facility (SNF)    Row Name 10/21/21 0804       Plan    Plan SNF    Patient/Family in Agreement with Plan yes    Plan Comments Hospice consult received. Chart reviewed. Hospice will contact the pt.’s stepdaughter Steph, after 3:00 pm today to discuss hospice services. If further assistance is needed please call [phone].               Discharge Codes    No documentation.                     Vandana Reynolds RN

## 2021-10-21 NOTE — PLAN OF CARE
Goal Outcome Evaluation:  Plan of Care Reviewed With: patient        Progress: no change  Outcome Summary: 08:16- Pt went into SVT 's, adenosine iv given, converted to sinus corina then Afib RVR. IV metoprolol given. Pt HR reduced to  Afib. Remained in controlled Afib rest of shift.  Pt restless off an on today. Several attempts to get out of bed. Tylenol given for possible pain. No improvement. Mag & K+ replaced. Pt possible discharge tomorrow to [facility]. Step daughter visted at bedside today.

## 2021-10-21 NOTE — PROGRESS NOTES
[facility] Medicine Services  PROGRESS NOTE    Patient Name: Mary Ann Cooper  : 3/31/1930  MRN: [id number]    Date of Admission: 10/15/2021  Primary Care Physician: Christi Gold MD    Subjective     CC: SVT     HPI:  In bed. Patient did not converse. Went into SVT today with 's - improved with IV adenosine and metoprolol. Patient was not symptomatic during this episode.     ROS:  Unable to obtain complete or reliable ROS due to mental status    Objective     Vital Signs:   Temp:  [97.8 °F (36.6 °C)-99.3 °F (37.4 °C)] 98.1 °F (36.7 °C)  Heart Rate:  [] 113  Resp:  [16] 16  BP: (103-134)/(76-94) 103/76     Physical Exam:  Constitutional: No acute distress. Eyes closed, did not converse   HENT: NCAT   Respiratory: Clear to auscultation bilaterally, respiratory effort normal on room air   Cardiovascular: Irregular rhythm, rate 100-120's without m/r/g   Gastrointestinal: Positive bowel sounds, nondistended   Musculoskeletal: No bilateral ankle edema  Psychiatric: Unable to assess   Neurologic: Moves all extremities with no focal deficits     Results Reviewed:  LAB RESULTS:      Lab 10/16/21  0255 10/15/21  1543   WBC 7.39 6.57   HEMOGLOBIN 15.2 15.6   HEMATOCRIT 44.2 45.3   PLATELETS 296 305   NEUTROS ABS 4.26 3.88   IMMATURE GRANS (ABS) 0.02 0.02   LYMPHS ABS 1.83 1.55   MONOS ABS 0.83 0.72   EOS ABS 0.39 0.34   MCV 88.9 89.5         Lab 10/20/21  1119 10/19/21  2009 10/17/21  0936 10/16/21  1526 10/16/21  0255 10/15/21  1543 10/15/21  1543   SODIUM 138  --  139  --  143  --  141   POTASSIUM 3.6  3.6 3.4* 3.8 4.2 3.8   < > 3.2*   CHLORIDE 105  --  104  --  105  --  103   CO2 23.0  --  24.0  --  28.0  --  29.0   ANION GAP 10.0  --  11.0  --  10.0  --  9.0   BUN 12  --  14  --  20  --  22   CREATININE 0.76  --  0.61  --  0.79  --  0.93   GLUCOSE 90  --  86  --  90  --  135*   CALCIUM 8.5  --  9.3  --  9.4  --  9.4   MAGNESIUM 2.5* 1.8  --   --  2.3  --   --    TSH  --   --    --   --  7.370*  --   --     < > = values in this interval not displayed.         Lab 10/15/21  1543   TOTAL PROTEIN 6.6   ALBUMIN 3.70   GLOBULIN 2.9   ALT (SGPT) 21   AST (SGOT) 30   BILIRUBIN 0.5   ALK PHOS 258*   LIPASE 69*         Lab 10/19/21  2009 10/15/21  1543   PROBNP  --  989.9   TROPONIN T <0.010 <0.010     Brief Urine Lab Results  (Last result in the past 365 days)      Color   Clarity   Blood   Leuk Est   Nitrite   Protein   CREAT   Urine HCG        10/15/21 1544 Yellow   Clear   Trace   Trace   Positive   100 mg/dL (2+)               Microbiology Results Abnormal     Procedure Component Value - Date/Time    COVID PRE-OP / PRE-PROCEDURE SCREENING ORDER (NO ISOLATION) - Swab, Nasopharynx [[id number]]  (Normal) Collected: 10/21/21 0802    Lab Status: Final result Specimen: Swab from Nasopharynx Updated: 10/21/21 0828    Narrative:      The following orders were created for panel order COVID PRE-OP / PRE-PROCEDURE SCREENING ORDER (NO ISOLATION) - Swab, Nasopharynx.  Procedure                               Abnormality         Status                     ---------                               -----------         ------                     COVID-19, ABBOTT IN-HOUS...[[id number]]  Normal              Final result                 Please view results for these tests on the individual orders.    COVID-19, ABBOTT IN-HOUSE,NASAL Swab (NO TRANSPORT MEDIA) 2 HR TAT - Swab, Nasopharynx [[id number]]  (Normal) Collected: 10/21/21 0802    Lab Status: Final result Specimen: Swab from Nasopharynx Updated: 10/21/21 0828     COVID19 Presumptive Negative    Narrative:      Fact sheet for providers: https://www.fda.gov/media/539301/download     Fact sheet for patients: https://www.fda.gov/media/364577/download    Test performed by PCR.  If inconsistent with clinical signs and symptoms patient should be tested with different authorized molecular test.    Gastrointestinal Panel, PCR - Stool, Per Rectum [[id number]]  (Normal)  Collected: 10/16/21 2027    Lab Status: Final result Specimen: Stool from Per Rectum Updated: 10/16/21 2153     Campylobacter Not Detected     Plesiomonas shigelloides Not Detected     Salmonella Not Detected     Vibrio Not Detected     Vibrio cholerae Not Detected     Yersinia enterocolitica Not Detected     Enteroaggregative E. coli (EAEC) Not Detected     Enteropathogenic E. coli (EPEC) Not Detected     Enterotoxigenic E. coli (ETEC) lt/st Not Detected     Shiga-like toxin-producing E. coli (STEC) stx1/stx2 Not Detected     Shigella/Enteroinvasive E. coli (EIEC) Not Detected     Cryptosporidium Not Detected     Cyclospora cayetanensis Not Detected     Entamoeba histolytica Not Detected     Giardia lamblia Not Detected     Adenovirus F40/41 Not Detected     Astrovirus Not Detected     Norovirus GI/GII Not Detected     Rotavirus A Not Detected     Sapovirus (I, II, IV or V) Not Detected    Clostridium Difficile Toxin - Stool, Per Rectum [[id number]]  (Normal) Collected: 10/16/21 2027    Lab Status: Final result Specimen: Stool from Per Rectum Updated: 10/16/21 2125    Narrative:      The following orders were created for panel order Clostridium Difficile Toxin - Stool, Per Rectum.  Procedure                               Abnormality         Status                     ---------                               -----------         ------                     Clostridium Difficile To...[[id number]]  Normal              Final result                 Please view results for these tests on the individual orders.    Clostridium Difficile Toxin, PCR - Stool, Per Rectum [[id number]]  (Normal) Collected: 10/16/21 2027    Lab Status: Final result Specimen: Stool from Per Rectum Updated: 10/16/21 2125     C. Difficile Toxins by PCR Not Detected    Narrative:      Performance characteristics of test not established for patients <2 years of age.  Negative for Toxigenic C. Difficile    COVID PRE-OP / PRE-PROCEDURE SCREENING ORDER (NO  ISOLATION) - Swab, Nasopharynx [[id number]]  (Normal) Collected: 10/15/21 1737    Lab Status: Final result Specimen: Swab from Nasopharynx Updated: 10/15/21 1807    Narrative:      The following orders were created for panel order COVID PRE-OP / PRE-PROCEDURE SCREENING ORDER (NO ISOLATION) - Swab, Nasopharynx.  Procedure                               Abnormality         Status                     ---------                               -----------         ------                     COVID-19 and FLU A/B PCR...[[id number]]  Normal              Final result                 Please view results for these tests on the individual orders.    COVID-19 and FLU A/B PCR - Swab, Nasopharynx [[id number]]  (Normal) Collected: 10/15/21 1737    Lab Status: Final result Specimen: Swab from Nasopharynx Updated: 10/15/21 1807     COVID19 Not Detected     Influenza A PCR Not Detected     Influenza B PCR Not Detected    Narrative:      Fact sheet for providers: https://www.fda.gov/media/194584/download    Fact sheet for patients: https://www.fda.gov/media/044601/download    Test performed by PCR.        No radiology results from the last 24 hrs    I have reviewed the medications:  Scheduled Meds:metoprolol tartrate, , ,   adenosine, 6 mg, Intravenous, Once  heparin (porcine), 5,000 Units, Subcutaneous, Q12H  levothyroxine, 75 mcg, Oral, Q AM  metoprolol tartrate, 25 mg, Oral, Q12H  mirtazapine, 15 mg, Oral, Nightly  sodium chloride, 500 mL, Intravenous, Once      Continuous Infusions:lactated ringers, 75 mL/hr, Last Rate: 75 mL/hr (10/21/21 0433)      PRN Meds:.acetaminophen **OR** acetaminophen **OR** acetaminophen  •  magnesium sulfate **OR** magnesium sulfate **OR** magnesium sulfate  •  melatonin  •  potassium chloride **OR** potassium chloride **OR** potassium chloride  •  sodium chloride  •  [COMPLETED] Insert peripheral IV **AND** sodium chloride    Assessment & Plan     Active Hospital Problems    Diagnosis  POA   • **Acute cystitis  without hematuria [N30.00]  Yes   • Severe malnutrition (CMS/HCC) [E43]  Yes   • Dementia (HCC) [F03.90]  Yes   • Hypothyroidism [E03.9]  Yes   • History of breast cancer [Z85.3]  Not Applicable   • Essential hypertension [I10]  Yes   • Chronic back pain [M54.9, G89.29]  Yes   • Atrial fibrillation (HCC) [I48.91]  Unknown      Resolved Hospital Problems   No resolved problems to display.     Brief Hospital Course to date:  Mary Ann Cooper is a 89 y.o. female nursing home resident with Hx of breast cancer, A. fib, dementia recently undergoing kyphoplasty 1 month ago at [facility]. She was brought to [facility] ED on 10/15/21 for diarrhea and AMS. She was found to have UTI and was treated with IV antibiotics. Despite treatment, she continues to have poor oral intake and is minimally interactive. She developed SVT on 10/21/21.     Enterobacter cloacae UTI  - Has completed 5 days of IV Rocephin     Asymmetric bladder wall thickening  - As seen on CT abdomen/pelvis  - Unclear clinical significance in the setting of UTI   - Urology has evaluated - recommend treating UTI, can follow up outpatient as necessary    Acute metabolic encephalopathy  Baseline dementia  Failure to thrive in the adult  - Continue Mirtazapine  - Palliative care following, daughter interested in speaking with hospice - planned for this afternoon  - Resides at [facility] care unit     Acute diarrhea, resolved     Presumed new onset atrial fibrillation  SVT  - Per family, no prior history. No anticoagulation per family request  - On Metoprolol 25mg BID  - Developed SVT with 's on 10/21, currently improved with adenosine 6mg IV x 1 dose and Metoprolol 5mg IV, will continue to monitor today and make adjustments as needed  - Mag / K+ low-normal respite replacement - started PO supplement. Needs repeat labs within 7 days to verify doses are appropriate    Hypothyroidism  - Continue Synthroid    Hx of breast cancer,  remote  - S/p BL mastectomy with reconstruction in 1989  - Per records review, patient recently had LT breast fluid collection evaluated at [facility] that could not be drained    Chronic back pain  - S/p kyphoplasty at [facility] 9/2021    DVT prophylaxis:  Medical and mechanical DVT prophylaxis orders are present.     AM-PAC 6 Clicks Score (PT): 7 (10/20/21 1139)    Disposition: Back to [facility] as early as tomorrow     CODE STATUS:   Code Status and Medical Interventions:   Ordered at: 10/15/21 1946     Limited Support to NOT Include:    Intubation     Level Of Support Discussed With:    Health Care Surrogate     Code Status:    No CPR     Medical Interventions (Level of Support Prior to Arrest):    Limited     Tarsha Santana PA-C  10/21/21

## 2021-10-21 NOTE — CASE MANAGEMENT/SOCIAL WORK
Continued Stay Note  [facility]     Patient Name: Mary Ann Cooper  MRN: [id number]  Today's Date: 10/21/2021    Admit Date: 10/15/2021     Discharge Plan     Row Name 10/21/21 0954       Plan    Plan Comments CM spoke with daughter Steph and updated on pt status and discharge plans with ambulance rescheduled for Friday 10/22 at 1415 if pt is medically ready for discharge. Steph agreeable to discharge plans. CM will continue to follow.    Row Name 10/21/21 0853       Plan    Plan Comments CM received update from CHERYL Cardenas and pt is not medically ready for discharge today related to SVT. CM cancelled ambulance transport and rescheduled with [facility] ambulance for Friday 10/22 @ 1415. CM has left message with Shannon at [facility] leaving an update and requeting return call. CM will continue to follow.    Final Discharge Disposition Code 03 - skilled nursing facility (SNF)    Row Name 10/21/21 0804       Plan    Plan SNF    Patient/Family in Agreement with Plan yes    Plan Comments Hospice consult received. Chart reviewed. Hospice will contact the pt.’s stepdaughter, Steph, after 3:00 pm today to discuss hospice services. If further assistance is needed please call [phone].               Discharge Codes    No documentation.                     Vandana Reynolds RN

## 2021-10-21 NOTE — PLAN OF CARE
Goal Outcome Evaluation:  Plan of Care Reviewed With: patient        Progress: no change   VSS, RA, Oriented to self only. Skin interventions in place. Fluids encouraged.  Pt is currently resting in bed. Will continue plan of care.

## 2021-10-21 NOTE — PLAN OF CARE
Goal Outcome Evaluation:           Progress: no change  Outcome Summary: Pt is curled up in fetal position and pleasantly confused but calm. Nursing reports she only took 1 pill today and has not eaten breakfast. Yesterday pt was up in the chair and ate some food and fluids. Pt has intermittent restlessness but able to redirect. Pt's d/c to [facility] delayed due to svt. Pt unable to return to assisted living.  Family to talk with hospice todayPalliative Team meeting 1300, Ericka Humphreys MD, Nayeli VILLAFANAPN, Anayeli Martin RN, CHPN, Ashley Shea RN, BSN, CHPN, Jaylin Enriquez RN CHPN, Anne-Marie Yen, RN CHPN, JAMES, OCN

## 2021-10-21 NOTE — NURSING NOTE
Abrasion occurred on pt's R shin while attempted to exit bed without staff assistance as she placed leg between bed rails. Site cleaned wish NS, dressed with mepilex scant bleeding.

## 2021-10-21 NOTE — PROGRESS NOTES
Continued Stay Note  [facility]     Patient Name: Mary Ann Cooper  MRN: [id number]  Today's Date: 10/21/2021    Admit Date: 10/15/2021     Discharge Plan     Row Name 10/21/21 0804       Plan    Plan SNF    Patient/Family in Agreement with Plan yes    Plan Comments Hospice consult received. Chart reviewed. Hospice will contact the pt.’s stepdaughter, Steph, after 3:00 pm today to discuss hospice services. If further assistance is needed please call [phone].               Discharge Codes    No documentation.                     Miriam Alejandro

## 2021-10-22 PROBLEM — N30.00 ACUTE CYSTITIS WITHOUT HEMATURIA: Status: RESOLVED | Noted: 2021-01-01 | Resolved: 2021-01-01

## 2021-10-22 NOTE — DISCHARGE SUMMARY
[facility] Medicine Services  TRANSFER SUMMARY    Patient Name: Mary Ann Cooper  : 3/31/1930  MRN: [id number]    Date of Admission: 10/15/2021  Date of Discharge:  10/22/21    Length of Stay: 7  Primary Care Physician: Christi Gold MD    Consults     Date and Time Order Name Status Description    10/19/2021  8:50 AM Inpatient Palliative Care MD Consult Completed     10/16/2021 12:35 AM Inpatient Urology Consult      10/16/2021 12:35 AM Inpatient Urology Consult Completed           Hospital Course     Presenting Problem:   Acute cystitis without hematuria [N30.00]    Active Hospital Problems    Diagnosis  POA   • Severe malnutrition (CMS/HCC) [E43]  Yes   • Dementia (HCC) [F03.90]  Yes   • Hypothyroidism [E03.9]  Yes   • History of breast cancer [Z85.3]  Not Applicable   • Essential hypertension [I10]  Yes   • Chronic back pain [M54.9, G89.29]  Yes   • Atrial fibrillation (HCC) [I48.91]  Yes      Resolved Hospital Problems    Diagnosis Date Resolved POA   • **Acute cystitis without hematuria [N30.00] 10/22/2021 Yes          Hospital Course:  Mary Ann Cooper is a 89 y.o. female nursing home resident with Hx of breast cancer, A. fib, dementia recently undergoing kyphoplasty 1 month ago at [facility]. She was brought to [facility] ED on 10/15/21 for diarrhea and AMS. She was found to have UTI and was treated with IV antibiotics. Despite treatment, she continued to have poor oral intake and was minimally interactive, however now that antibiotic course completed she is much improved near baseline and appropriate to return to her long term care facility.      Enterobacter cloacae UTI  - Has completed 5 days of IV Rocephin       Acute metabolic encephalopathy  Baseline dementia  Failure to thrive in the adult  - Continue Mirtazapine  - Resides at [facility] care unit      Acute diarrhea, resolved      Presumed new onset paroxysmal atrial fibrillation  NSVT episode  x1  - No anticoagulation per family request due to age and goals of care  - likely related to low K+ and Mg++ which have been replaced     Hypothyroidism  - Continue Synthroid     Hx of breast cancer, remote  - S/p BL mastectomy with reconstruction in 1989  - Per records review, patient recently had LT breast fluid collection evaluated at [facility] that could not be drained     Chronic back pain  - S/p kyphoplasty at [facility] 9/2021            Discharge Follow Up Recommendations for outpatient labs/diagnostics:  - repeat K+ and Mg++ in ~5-7 days, patient has been started on chronic supplementation    Day of Discharge     HPI:   Up to chair eating lunch, no new events or complaint.     Vital Signs:   Temp:  [97.7 °F (36.5 °C)-99.6 °F (37.6 °C)] 99.6 °F (37.6 °C)  Heart Rate:  [] 69  Resp:  [14-18] 14  BP: ()/() 108/78     Physical Exam:  Constitutional: No acute distress, awake, alert, nontoxic, thin body habitus  Respiratory: Good effort, nonlabored respirations   Cardiovascular: NSr  Musculoskeletal: No peripheral edema, normal muscle tone for age      Pertinent Results     Results from last 7 days   Lab Units 10/22/21  1121 10/21/21  0848 10/20/21  1119 10/19/21  2009 10/17/21  0936 10/16/21  1526 10/16/21  0255 10/15/21  1543 10/15/21  1543   WBC 10*3/mm3  --   --   --   --   --   --  7.39  --  6.57   HEMOGLOBIN g/dL  --   --   --   --   --   --  15.2  --  15.6   HEMATOCRIT %  --   --   --   --   --   --  44.2  --  45.3   PLATELETS 10*3/mm3  --   --   --   --   --   --  296  --  305   SODIUM mmol/L 140 140 138  --  139  --  143  --  141   POTASSIUM mmol/L 3.9 3.6 3.6  3.6 3.4* 3.8 4.2 3.8   < > 3.2*   CHLORIDE mmol/L 106 107 105  --  104  --  105  --  103   CO2 mmol/L 24.0 21.0* 23.0  --  24.0  --  28.0  --  29.0   BUN mg/dL 11 11 12  --  14  --  20  --  22   CREATININE mg/dL 0.66 0.66 0.76  --  0.61  --  0.79  --  0.93   GLUCOSE mg/dL 84 117* 90  --  86  --  90  --  135*   CALCIUM  mg/dL 8.7 8.4 8.5  --  9.3  --  9.4  --  9.4    < > = values in this interval not displayed.     Results from last 7 days   Lab Units 10/15/21  1543   BILIRUBIN mg/dL 0.5   ALK PHOS U/L 258*   ALT (SGPT) U/L 21   AST (SGOT) U/L 30           Invalid input(s): TG, LDLCALC, LDLREALC  Results from last 7 days   Lab Units 10/16/21  0255   TSH uIU/mL 7.370*     Brief Urine Lab Results  (Last result in the past 365 days)      Color   Clarity   Blood   Leuk Est   Nitrite   Protein   CREAT   Urine HCG        10/15/21 1544 Yellow   Clear   Trace   Trace   Positive   100 mg/dL (2+)                 Microbiology Results Abnormal     Procedure Component Value - Date/Time    COVID PRE-OP / PRE-PROCEDURE SCREENING ORDER (NO ISOLATION) - Swab, Nasopharynx [[id number]]  (Normal) Collected: 10/20/21 1037    Lab Status: Final result Specimen: Swab from Nasopharynx Updated: 10/21/21 1414    Narrative:      The following orders were created for panel order COVID PRE-OP / PRE-PROCEDURE SCREENING ORDER (NO ISOLATION) - Swab, Nasopharynx.  Procedure                               Abnormality         Status                     ---------                               -----------         ------                     COVID-19, APTIMA PANTHER...[[id number]]  Normal              Final result                 Please view results for these tests on the individual orders.    COVID-19, APTIMA PANTHER SARMAD IN-HOUSE NP/OP SWAB IN UTM/VTM/SALINE TRANSPORT MEDIA 24HR TAT - Swab, Nasopharynx [[id number]]  (Normal) Collected: 10/20/21 1037    Lab Status: Final result Specimen: Swab from Nasopharynx Updated: 10/21/21 1414     COVID19 Not Detected    Narrative:      Fact sheet for providers: https://www.fda.gov/media/512429/download     Fact sheet for patients: https://www.fda.gov/media/791644/download    Test performed by RT PCR.    COVID PRE-OP / PRE-PROCEDURE SCREENING ORDER (NO ISOLATION) - Swab, Nasopharynx [[id number]]  (Normal) Collected: 10/21/21 0802    Lab  Status: Final result Specimen: Swab from Nasopharynx Updated: 10/21/21 0828    Narrative:      The following orders were created for panel order COVID PRE-OP / PRE-PROCEDURE SCREENING ORDER (NO ISOLATION) - Swab, Nasopharynx.  Procedure                               Abnormality         Status                     ---------                               -----------         ------                     COVID-19, ABBOTT IN-HOUS...[[id number]]  Normal              Final result                 Please view results for these tests on the individual orders.    COVID-19, ABBOTT IN-HOUSE,NASAL Swab (NO TRANSPORT MEDIA) 2 HR TAT - Swab, Nasopharynx [[id number]]  (Normal) Collected: 10/21/21 0802    Lab Status: Final result Specimen: Swab from Nasopharynx Updated: 10/21/21 0828     COVID19 Presumptive Negative    Narrative:      Fact sheet for providers: https://www.fda.gov/media/107296/download     Fact sheet for patients: https://www.fda.gov/media/775370/download    Test performed by PCR.  If inconsistent with clinical signs and symptoms patient should be tested with different authorized molecular test.    Gastrointestinal Panel, PCR - Stool, Per Rectum [[id number]]  (Normal) Collected: 10/16/21 2027    Lab Status: Final result Specimen: Stool from Per Rectum Updated: 10/16/21 2153     Campylobacter Not Detected     Plesiomonas shigelloides Not Detected     Salmonella Not Detected     Vibrio Not Detected     Vibrio cholerae Not Detected     Yersinia enterocolitica Not Detected     Enteroaggregative E. coli (EAEC) Not Detected     Enteropathogenic E. coli (EPEC) Not Detected     Enterotoxigenic E. coli (ETEC) lt/st Not Detected     Shiga-like toxin-producing E. coli (STEC) stx1/stx2 Not Detected     Shigella/Enteroinvasive E. coli (EIEC) Not Detected     Cryptosporidium Not Detected     Cyclospora cayetanensis Not Detected     Entamoeba histolytica Not Detected     Giardia lamblia Not Detected     Adenovirus F40/41 Not Detected      Astrovirus Not Detected     Norovirus GI/GII Not Detected     Rotavirus A Not Detected     Sapovirus (I, II, IV or V) Not Detected    Clostridium Difficile Toxin - Stool, Per Rectum [[id number]]  (Normal) Collected: 10/16/21 2027    Lab Status: Final result Specimen: Stool from Per Rectum Updated: 10/16/21 2125    Narrative:      The following orders were created for panel order Clostridium Difficile Toxin - Stool, Per Rectum.  Procedure                               Abnormality         Status                     ---------                               -----------         ------                     Clostridium Difficile To...[[id number]]  Normal              Final result                 Please view results for these tests on the individual orders.    Clostridium Difficile Toxin, PCR - Stool, Per Rectum [[id number]]  (Normal) Collected: 10/16/21 2027    Lab Status: Final result Specimen: Stool from Per Rectum Updated: 10/16/21 2125     C. Difficile Toxins by PCR Not Detected    Narrative:      Performance characteristics of test not established for patients <2 years of age.  Negative for Toxigenic C. Difficile    COVID PRE-OP / PRE-PROCEDURE SCREENING ORDER (NO ISOLATION) - Swab, Nasopharynx [[id number]]  (Normal) Collected: 10/15/21 1737    Lab Status: Final result Specimen: Swab from Nasopharynx Updated: 10/15/21 1807    Narrative:      The following orders were created for panel order COVID PRE-OP / PRE-PROCEDURE SCREENING ORDER (NO ISOLATION) - Swab, Nasopharynx.  Procedure                               Abnormality         Status                     ---------                               -----------         ------                     COVID-19 and FLU A/B PCR...[[id number]]  Normal              Final result                 Please view results for these tests on the individual orders.    COVID-19 and FLU A/B PCR - Swab, Nasopharynx [[id number]]  (Normal) Collected: 10/15/21 1737    Lab Status: Final result Specimen:  Swab from Nasopharynx Updated: 10/15/21 1807     COVID19 Not Detected     Influenza A PCR Not Detected     Influenza B PCR Not Detected    Narrative:      Fact sheet for providers: https://www.fda.gov/media/854935/download    Fact sheet for patients: https://www.fda.gov/media/690790/download    Test performed by PCR.          Imaging Results (All)     Procedure Component Value Units Date/Time    CT Chest Without Contrast Diagnostic [[id number]] Collected: 10/15/21 1808     Updated: 10/16/21 2321    Narrative:      EXAMINATION: CT CHEST WO CONTRAST DIAGNOSTIC, CT ABDOMEN/ PELVIS WO  CONTRAST - 10/15/2021      INDICATION: Generalized weakness, worsening.     TECHNIQUE: 5 mm unenhanced images through the chest, abdomen and pelvis.     The radiation dose reduction device was turned on for each scan per the  ALARA (As Low as Reasonably Achievable) protocol.     COMPARISON: None     FINDINGS: History indicates worsening generalized weakness.     CT SCAN OF THE CHEST WITHOUT CONTRAST: No significant mediastinal hilar  or axillary adenopathy is appreciated. There is no evidence of  pericardial or pleural effusion. Lung window images show densely  calcified 13 mm right lower lobe nodule, and mild linear scarring in  both lung bases. No evidence of pneumonia is identified. Note is made of  previous lower thoracic kyphoplasties.       Impression:      No evidence of active chest disease.     ABDOMEN AND PELVIS CT SCAN WITHOUT CONTRAST: No significant  abnormalities are seen of the liver, spleen, pancreas, adrenal glands,  or kidneys for a non-IV contrast enhanced exam. Bowel loops are not  significantly distended, although there is slightly increased small  bowel gas. No bowel wall edema or pneumatosis is seen. Bladder is  distended, and appears slightly thick-walled fluid for the degree of  distention present, in particular, the anterior wall of the bladder  appears thickened, almost masslike as seen on sagittal image #68  "series  900. Maximal oblique diameter of the bladder is 13 mm. Uterus and  ovaries are not identified, either absent or atrophic. No intrapelvic  free fluid or inflammatory change is seen. Bony structures appear to be  intact. Sclerotic changes of the left sacrum suggest an old healed  insufficiency fracture here.     IMPRESSION:   1. Slightly \"gassy\" appearance the abdomen but no evidence of  significant ileus or obstruction.     2. Distended bladder, with asymmetric thickening of the bladder dome, at  least potentially a sessile mass.     3. No evidence of acute inflammatory process or other clearly acute  intra-abdominal or intrapelvic disease.     4. Pattern of sclerosis of the left sacrum suggesting old healed sacral  insufficiency fracture.     DICTATED:   10/15/2021  EDITED/ls :   10/15/2021        This report was finalized on 10/16/2021 11:18 PM by Dr. Ozzie Velasquez MD.       CT Abdomen Pelvis Without Contrast [[id number]] Collected: 10/15/21 1808     Updated: 10/16/21 2321    Narrative:      EXAMINATION: CT CHEST WO CONTRAST DIAGNOSTIC, CT ABDOMEN/ PELVIS WO  CONTRAST - 10/15/2021      INDICATION: Generalized weakness, worsening.     TECHNIQUE: 5 mm unenhanced images through the chest, abdomen and pelvis.     The radiation dose reduction device was turned on for each scan per the  ALARA (As Low as Reasonably Achievable) protocol.     COMPARISON: None     FINDINGS: History indicates worsening generalized weakness.     CT SCAN OF THE CHEST WITHOUT CONTRAST: No significant mediastinal hilar  or axillary adenopathy is appreciated. There is no evidence of  pericardial or pleural effusion. Lung window images show densely  calcified 13 mm right lower lobe nodule, and mild linear scarring in  both lung bases. No evidence of pneumonia is identified. Note is made of  previous lower thoracic kyphoplasties.       Impression:      No evidence of active chest disease.     ABDOMEN AND PELVIS CT SCAN WITHOUT CONTRAST: No " "significant  abnormalities are seen of the liver, spleen, pancreas, adrenal glands,  or kidneys for a non-IV contrast enhanced exam. Bowel loops are not  significantly distended, although there is slightly increased small  bowel gas. No bowel wall edema or pneumatosis is seen. Bladder is  distended, and appears slightly thick-walled fluid for the degree of  distention present, in particular, the anterior wall of the bladder  appears thickened, almost masslike as seen on sagittal image #68 series  900. Maximal oblique diameter of the bladder is 13 mm. Uterus and  ovaries are not identified, either absent or atrophic. No intrapelvic  free fluid or inflammatory change is seen. Bony structures appear to be  intact. Sclerotic changes of the left sacrum suggest an old healed  insufficiency fracture here.     IMPRESSION:   1. Slightly \"gassy\" appearance the abdomen but no evidence of  significant ileus or obstruction.     2. Distended bladder, with asymmetric thickening of the bladder dome, at  least potentially a sessile mass.     3. No evidence of acute inflammatory process or other clearly acute  intra-abdominal or intrapelvic disease.     4. Pattern of sclerosis of the left sacrum suggesting old healed sacral  insufficiency fracture.     DICTATED:   10/15/2021  EDITED/ls :   10/15/2021        This report was finalized on 10/16/2021 11:18 PM by Dr. Ozzie Velasquez MD.       CT Head Without Contrast [[id number]] Collected: 10/15/21 1802     Updated: 10/16/21 2224    Narrative:      EXAMINATION: CT HEAD WO CONTRAST - 10/15/2021     INDICATION: Dementia.     TECHNIQUE: 5 mm unenhanced images through the brain     The radiation dose reduction device was turned on for each scan per the  ALARA (As Low as Reasonably Achievable) protocol.     COMPARISON: None     FINDINGS: The calvarium appears intact. The included paranasal sinuses  and mastoids appear clear. Soft tissue window images show advanced  generalized cerebral atrophy, " expected for age. There is no evidence of  hemorrhage, contusion, edema, mass or mass effect, hydrocephalus, or  abnormal extra-axial collection.       Impression:      Age appropriate generalized cerebral atrophy. No evidence of  acute intracranial disease is seen.     DICTATED:   10/15/2021  EDITED/ls :   10/15/2021     This report was finalized on 10/16/2021 10:21 PM by Dr. Ozzie Velasquez MD.                     Discharge Details        Discharge Medications      New Medications      Instructions Start Date   magnesium (as) gluconate 500 (27 Mg) MG tablet  Commonly known as: MAGONATE   27 mg, Oral, Daily   Start Date: October 23, 2021     Metoprolol Tartrate 37.5 MG tablet   37.5 mg, Oral, Every 12 Hours Scheduled      mirtazapine 15 MG disintegrating tablet  Commonly known as: REMERON SOL-TAB   15 mg, Translingual, Nightly      potassium chloride 10 MEQ CR capsule  Commonly known as: MICRO-K   20 mEq, Oral, Daily   Start Date: October 23, 2021        Continue These Medications      Instructions Start Date   levothyroxine 75 MCG tablet  Commonly known as: SYNTHROID, LEVOTHROID   75 mcg, Oral, Daily         Stop These Medications    metoprolol succinate XL 25 MG 24 hr tablet  Commonly known as: TOPROL-XL            Allergies   Allergen Reactions   • Codeine Unknown - High Severity   • Levothyroxine Unknown - Low Severity   • Penicillins Unknown - High Severity     ** tolerates ceftriaxone **         Discharge Disposition:  Long Term Care (DC - External)    Discharge Diet:  Diet Order   Procedures   • Diet Soft Texture; Ground; Thin       CODE STATUS:    Code Status and Medical Interventions:   Ordered at: 10/15/21 1946     Limited Support to NOT Include:    Intubation     Level Of Support Discussed With:    Health Care Surrogate     Code Status:    No CPR     Medical Interventions (Level of Support Prior to Arrest):    Limited         No future appointments.    Additional Instructions for the Follow-ups that You Need  to Schedule     Discharge Follow-up with PCP   As directed       Currently Documented PCP:    Christi Gold MD    PCP Phone Number:    [phone]     Follow Up Details: within 1 week for Transiton of Care and repeat electrollytes -- started on K+ and Mg++ supplements this hospital stay               Time Spent on Discharge: I spent  35  minutes on this discharge activity which included: face-to-face encounter with the patient, reviewing the data in the system, coordination of the care with the nursing staff as well as consultants, documentation, and entering orders.

## 2021-10-22 NOTE — PLAN OF CARE
Goal Outcome Evaluation:  Plan of Care Reviewed With: patient           Outcome Summary: TONYA, long sosa, RA. Pt oriented to self only. Very restless, makes multiple attempts to get out of bed. Had BMs at beginning of shift. Voiding.

## 2021-10-22 NOTE — PROGRESS NOTES
Continued Stay Note  [facility]     Patient Name: Mary Ann Cooper  MRN: [id number]  Today's Date: 10/22/2021    Admit Date: 10/15/2021     Discharge Plan     Row Name 10/22/21 1026       Plan    Plan SNF    Patient/Family in Agreement with Plan yes    Plan Comments Call placed to Isadora, daughter, per conversation she was aware of the hospice consult & understands hospice services. Isadora is aware that a pt. cannot be admitted to Hospice while accessing her skilled days. Isadora is agreeable to having the pt.’s referral sent to [facility] & have hospice f/u in 3-4 weeks. 24hr hospice contact number was provided & she is aware that the pt.  would not have to return to the hospital or be seen by the PCP for an assessment for hospice to be completed    Row Name 10/22/21 0834                                Discharge Codes    No documentation.                     Miriam Alejandro

## 2021-10-22 NOTE — DISCHARGE PLACEMENT REQUEST
Mary Ann Cooper (89 y.o. Female)   Referring: CAROLINA Lopes  PCP: Christi Lassiter negative on 10/20/21  Hospice Dx: Dementia  Pt lives @ [facility] in the memory care unit  She will d/c today & use her Medicare skilled days            Date of Birth Social Security Number Address Home Phone MRN    03/31/1930  [address]  [place] [id number] [phone] [id number]    Quaker Marital Status             Orthodoxy        Admission Date Admission Type Admitting Provider Attending Provider Department, Room/Bed    10/15/21 Emergency Frances Llanos MD Hall, Holly, MD [facility] 2F, S203/1    Discharge Date Discharge Disposition Discharge Destination                         Attending Provider: Frances Llanos MD    Allergies: Codeine, Levothyroxine, Penicillins    Isolation: None   Infection: None   Code Status: No CPR   Advance Care Planning Activity    Ht: --   Wt: 45.3 kg (99 lb 14.4 oz)    Admission Cmt: None   Principal Problem: Acute cystitis without hematuria [N30.00]                 Active Insurance as of 10/15/2021     Primary Coverage     Payor Plan Insurance Group Employer/Plan Group    MEDICARE MEDICARE A & B      Payor Plan Address Payor Plan Phone Number Payor Plan Fax Number Effective Dates    [address] [phone]  3/1/1995 - None Entered    [place] [id number]       Subscriber Name Subscriber Birth Date Member ID       MARY ANN COOPER 3/31/1930 [id number]                 Emergency Contacts      (Rel.) Home Phone Work Phone Mobile Phone    Willie Moss (Son) -- -- [phone]    Steph Espino (Daughter) -- -- [phone]    KennethIsadora (Daughter) -- -- [phone]            Emergency Contact Information     Name Relation Home Work Mobile    Willie Moss Son   [phone]    Steph Espino Daughter   [phone]    KennethIsadora vega Daughter   [phone]          Insurance Information                MEDICARE/MEDICARE A & B Phone:  [phone]    Subscriber: Mary Ann Cooper Subscriber#: [id number]    Group#: -- Precert#: --          Problem List           Codes Noted - Resolved       Hospital    Severe malnutrition (CMS/HCC) ICD-10-CM: E43  ICD-9-CM: 261 10/18/2021 - Present    * (Principal) Acute cystitis without hematuria ICD-10-CM: N30.00  ICD-9-CM: 595.0 10/15/2021 - Present    Dementia (HCC) ICD-10-CM: F03.90  ICD-9-CM: 294.20 10/15/2021 - Present    Hypothyroidism ICD-10-CM: E03.9  ICD-9-CM: 244.9 10/15/2021 - Present    History of breast cancer ICD-10-CM: Z85.3  ICD-9-CM: V10.3 10/15/2021 - Present    Essential hypertension ICD-10-CM: I10  ICD-9-CM: 401.9 10/15/2021 - Present    Chronic back pain ICD-10-CM: M54.9, G89.29  ICD-9-CM: 724.5, 338.29 10/15/2021 - Present    Atrial fibrillation (HCC) ICD-10-CM: I48.91  ICD-9-CM: 427.31 10/15/2021 - Present             History & Physical      Shreya Asencio MD at 10/15/21 192              [facility] Medicine Services  HISTORY AND PHYSICAL    Patient Name: Mary Ann Cooper  : 3/31/1930  MRN: [id number]  Primary Care Physician: Christi Gold MD  Date of admission: 10/15/2021    Subjective   Subjective     Chief Complaint:  Confusion, diarrhea     HPI:  Mary Ann Cooper is a 89 y.o. female with a past medical history significant for breast cancer s/p bilateral mastectomy, hypothyroidism, degenerative disc disease s/p kyphoplasty  and dementia presents to the ED via EMS due to increased confusion and diarrhea reports by nursing staff at [facility].  Patient has underlying dementia and just received ativan therefore HPI was obtained by Daughter in law, Steph Espino via phone.  She reports patient recently had kyphoplasty by Dr. Eugene at [facility] approximately 4 weeks ago for chronic back pain.  Unfortunately she notes her pain has not improved.  She did received a call tonight from nursing facility due to increased confusion.  Daughter in law is currently in  [place] but will be back tomorrow am.  She is unaware of any known fever, chills, nausea, vomiting, abdominal pain, chest pain.  Upon arrival to the ED patient was noted to be in Atrial fibrillation RVR with HR of 121.  Daughter in law confirms patient has no underlying history.  Currently she continues to be in afib but is rate controlled.  Workup in the ED reveals UA consistent with UTI.  CT of abdomen and pelvis is concerning for ? Sessile mass due to asymmetric thickening of the bladder dome.  Patient will be admitted to [facility] under the care of the Hospitalist for further evaluation and treatment.       COVID Details:    Symptoms:    [x] NONE [] Fever []  Cough [] Shortness of breath [] Change in taste/smell      The patient has a COVID HM Topic on their chart, and they are fully vaccinated.      Review of Systems   Unable to perform ROS: Dementia        All other systems reviewed and are negative.     Personal History     Past Medical History:   Diagnosis Date   • Cancer (HCC)    • Disease of thyroid gland    • Hypertension        Past Surgical History:   Procedure Laterality Date   • BREAST SURGERY     • HYSTERECTOMY         Family History: family history is not on file. Otherwise pertinent FHx was reviewed and unremarkable.     Social History:  reports that she has never smoked. She has never used smokeless tobacco. She reports that she does not drink alcohol and does not use drugs.  Social History     Social History Narrative    Resides at [facility]        Medications:       Allergies   Allergen Reactions   • Codeine Unknown - High Severity   • Levothyroxine Unknown - Low Severity   • Penicillins Unknown - High Severity       Objective   Objective     Vital Signs:   Temp:  [98.6 °F (37 °C)] 98.6 °F (37 °C)  Heart Rate:  [93-96] 96  Resp:  [16] 16  BP: (125)/(74) 125/74    Physical Exam  Vitals reviewed.   Constitutional:       General: She is not in acute distress.     Appearance: She is  ill-appearing. She is not toxic-appearing or diaphoretic.      Comments:   Cachetic    HENT:      Head: Normocephalic and atraumatic.      Nose: Nose normal.      Mouth/Throat:      Mouth: Mucous membranes are dry.      Pharynx: Oropharynx is clear.   Eyes:      General: No scleral icterus.        Right eye: No discharge.         Left eye: No discharge.      Extraocular Movements: Extraocular movements intact.      Conjunctiva/sclera: Conjunctivae normal.      Pupils: Pupils are equal, round, and reactive to light.   Cardiovascular:      Rate and Rhythm: Normal rate. Rhythm irregular.      Pulses: Normal pulses.      Heart sounds: Normal heart sounds.   Pulmonary:      Effort: Pulmonary effort is normal.      Breath sounds: Normal breath sounds.   Abdominal:      General: Abdomen is flat. Bowel sounds are normal. There is no distension.      Palpations: Abdomen is soft. There is no mass.      Tenderness: There is no abdominal tenderness. There is no right CVA tenderness, left CVA tenderness, guarding or rebound.      Hernia: No hernia is present.   Musculoskeletal:         General: No swelling, tenderness, deformity or signs of injury. Normal range of motion.      Cervical back: Normal range of motion and neck supple.      Right lower leg: No edema.      Left lower leg: No edema.   Skin:     General: Skin is warm and dry.      Comments: Left breast with noted fluid/edema    Neurological:      General: No focal deficit present.      Mental Status: She is alert. Mental status is at baseline.      Comments: Able to tell me her name only    Psychiatric:         Mood and Affect: Mood normal.      Comments: Unable to follow most commands.              Results Reviewed:  I have personally reviewed most recent indicated data and agree with findings including:  [x]  Laboratory  [x]  Radiology  [x]  EKG/Telemetry  []  Pathology  []  Cardiac/Vascular Studies  []  Old records  []  Other:  Most pertinent findings include:    LAB  RESULTS:      Lab 10/15/21  1543   WBC 6.57   HEMOGLOBIN 15.6   HEMATOCRIT 45.3   PLATELETS 305   NEUTROS ABS 3.88   IMMATURE GRANS (ABS) 0.02   LYMPHS ABS 1.55   MONOS ABS 0.72   EOS ABS 0.34   MCV 89.5         Lab 10/15/21  1543   SODIUM 141   POTASSIUM 3.2*   CHLORIDE 103   CO2 29.0   ANION GAP 9.0   BUN 22   CREATININE 0.93   GLUCOSE 135*   CALCIUM 9.4         Lab 10/15/21  1543   TOTAL PROTEIN 6.6   ALBUMIN 3.70   GLOBULIN 2.9   ALT (SGPT) 21   AST (SGOT) 30   BILIRUBIN 0.5   ALK PHOS 258*   LIPASE 69*         Lab 10/15/21  1543   PROBNP 989.9   TROPONIN T <0.010                 Brief Urine Lab Results  (Last result in the past 365 days)      Color   Clarity   Blood   Leuk Est   Nitrite   Protein   CREAT   Urine HCG        10/15/21 1544 Yellow   Clear   Trace   Trace   Positive   100 mg/dL (2+)               Microbiology Results (last 10 days)     Procedure Component Value - Date/Time    COVID PRE-OP / PRE-PROCEDURE SCREENING ORDER (NO ISOLATION) - Swab, Nasopharynx [[id number]]  (Normal) Collected: 10/15/21 1737    Lab Status: Final result Specimen: Swab from Nasopharynx Updated: 10/15/21 1807    Narrative:      The following orders were created for panel order COVID PRE-OP / PRE-PROCEDURE SCREENING ORDER (NO ISOLATION) - Swab, Nasopharynx.  Procedure                               Abnormality         Status                     ---------                               -----------         ------                     COVID-19 and FLU A/B PCR...[[id number]]  Normal              Final result                 Please view results for these tests on the individual orders.    COVID-19 and FLU A/B PCR - Swab, Nasopharynx [[id number]]  (Normal) Collected: 10/15/21 1737    Lab Status: Final result Specimen: Swab from Nasopharynx Updated: 10/15/21 1807     COVID19 Not Detected     Influenza A PCR Not Detected     Influenza B PCR Not Detected    Narrative:      Fact sheet for providers:  "https://www.fda.gov/media/192002/download    Fact sheet for patients: https://www.fda.gov/media/575902/download    Test performed by [facility].          CT Abdomen Pelvis Without Contrast    Result Date: 10/15/2021  EXAMINATION: CT CHEST WO CONTRAST DIAGNOSTIC, CT ABDOMEN/ PELVIS WO CONTRAST - 10/15/2021  INDICATION: Generalized weakness, worsening.  TECHNIQUE: 5 mm unenhanced images through the chest, abdomen and pelvis.  The radiation dose reduction device was turned on for each scan per the ALARA (As Low as Reasonably Achievable) protocol.  COMPARISON: None  FINDINGS: History indicates worsening generalized weakness.  CT SCAN OF THE CHEST WITHOUT CONTRAST: No significant mediastinal hilar or axillary adenopathy is appreciated. There is no evidence of pericardial or pleural effusion. Lung window images show densely calcified 13 mm right lower lobe nodule, and mild linear scarring in both lung bases. No evidence of pneumonia is identified. Note is made of previous lower thoracic kyphoplasties.      Impression: No evidence of active chest disease.  ABDOMEN AND PELVIS CT SCAN WITHOUT CONTRAST: No significant abnormalities are seen of the liver, spleen, pancreas, adrenal glands, or kidneys for a non-IV contrast enhanced exam. Bowel loops are not significantly distended, although there is slightly increased small bowel gas. No bowel wall edema or pneumatosis is seen. Bladder is distended, and appears slightly thick-walled fluid for the degree of distention present, in particular, the anterior wall of the bladder appears thickened, almost masslike as seen on sagittal image #68 series 900. Maximal oblique diameter of the bladder is 13 mm. Uterus and ovaries are not identified, either absent or atrophic. No intrapelvic free fluid or inflammatory change is seen. Bony structures appear to be intact. Sclerotic changes of the left sacrum suggest an old healed insufficiency fracture here.  IMPRESSION: 1. Slightly \"gassy\" appearance " the abdomen but no evidence of significant ileus or obstruction.  2. Distended bladder, with asymmetric thickening of the bladder dome, at least potentially a sessile mass.  3. No evidence of acute inflammatory process or other clearly acute intra-abdominal or intrapelvic disease.  4. Pattern of sclerosis of the left sacrum suggesting old healed sacral insufficiency fracture.  DICTATED:   10/15/2021 EDITED/ls :   10/15/2021       CT Head Without Contrast    Result Date: 10/15/2021  EXAMINATION: CT HEAD WO CONTRAST - 10/15/2021  INDICATION: Dementia.  TECHNIQUE: 5 mm unenhanced images through the brain  The radiation dose reduction device was turned on for each scan per the ALARA (As Low as Reasonably Achievable) protocol.  COMPARISON: None  FINDINGS: The calvarium appears intact. The included paranasal sinuses and mastoids appear clear. Soft tissue window images show advanced generalized cerebral atrophy, expected for age. There is no evidence of hemorrhage, contusion, edema, mass or mass effect, hydrocephalus, or abnormal extra-axial collection.      Impression: Age appropriate generalized cerebral atrophy. No evidence of acute intracranial disease is seen.  DICTATED:   10/15/2021 EDITED/ls :   10/15/2021      CT Chest Without Contrast Diagnostic    Result Date: 10/15/2021  EXAMINATION: CT CHEST WO CONTRAST DIAGNOSTIC, CT ABDOMEN/ PELVIS WO CONTRAST - 10/15/2021  INDICATION: Generalized weakness, worsening.  TECHNIQUE: 5 mm unenhanced images through the chest, abdomen and pelvis.  The radiation dose reduction device was turned on for each scan per the ALARA (As Low as Reasonably Achievable) protocol.  COMPARISON: None  FINDINGS: History indicates worsening generalized weakness.  CT SCAN OF THE CHEST WITHOUT CONTRAST: No significant mediastinal hilar or axillary adenopathy is appreciated. There is no evidence of pericardial or pleural effusion. Lung window images show densely calcified 13 mm right lower lobe nodule,  "and mild linear scarring in both lung bases. No evidence of pneumonia is identified. Note is made of previous lower thoracic kyphoplasties.      Impression: No evidence of active chest disease.  ABDOMEN AND PELVIS CT SCAN WITHOUT CONTRAST: No significant abnormalities are seen of the liver, spleen, pancreas, adrenal glands, or kidneys for a non-IV contrast enhanced exam. Bowel loops are not significantly distended, although there is slightly increased small bowel gas. No bowel wall edema or pneumatosis is seen. Bladder is distended, and appears slightly thick-walled fluid for the degree of distention present, in particular, the anterior wall of the bladder appears thickened, almost masslike as seen on sagittal image #68 series 900. Maximal oblique diameter of the bladder is 13 mm. Uterus and ovaries are not identified, either absent or atrophic. No intrapelvic free fluid or inflammatory change is seen. Bony structures appear to be intact. Sclerotic changes of the left sacrum suggest an old healed insufficiency fracture here.  IMPRESSION: 1. Slightly \"gassy\" appearance the abdomen but no evidence of significant ileus or obstruction.  2. Distended bladder, with asymmetric thickening of the bladder dome, at least potentially a sessile mass.  3. No evidence of acute inflammatory process or other clearly acute intra-abdominal or intrapelvic disease.  4. Pattern of sclerosis of the left sacrum suggesting old healed sacral insufficiency fracture.  DICTATED:   10/15/2021 EDITED/ls :   10/15/2021             Assessment/Plan   Assessment & Plan       Acute cystitis without hematuria    Dementia (HCC)    Hypothyroidism    History of breast cancer    Essential hypertension    Chronic back pain      89 year old female presents to the ED via EMS due to increased confusion and diarrhea noted by nursing staff today at [facility].     1) Acute cystitis without hematuria       Altered mental status       ? Bladder mass "   -likely contributing to increased confusion   -CT of head negative  -UA as noted above  -CT of abdomen and pelvis was concerning for ? bladder mass, will ask urology to see in am   -continue rocephin   -urine culture pending     2) Acute Diarrhea  -C-Diff PCR pending   -check stool culture  -keep in spore precautions   -strict I &O  -IVF     3) Atrial fibrillation, new   -family reports no prior history   -initially upon arrival , now 89's  -family reports previously on atenolol but PCP discontinued, unclear as to why. Family does not want aggressive measures to treat afib.  Daughter in law reports she will follow up with PCP  -ok with echo in am   -MIC1XN2-WFCm score; 4  -telemetry monitoring     4) Dementia  -previously on Aricept, unclear if currently on.  Daughter in law reports only med is levothyroxine     5) Hypothyroidism  -check TSH  -continue synthroid     6) History of breast cancer  -s/p bilateral mastectomy with reconstruction 1989  -recently evaluated at [place] due to left breast fluid collection  -per Daughter in law: was told they could drain but advised not to due to increased risk and associated age.      7) Chronic back pain   -s/p kyphoplasty at [place] 9/2021      DVT prophylaxis:  Scds, heparin     CODE STATUS:  DNR/DNI        This note has been completed as part of a split-shared workflow.     Signature: Electronically signed by CAROLINA Sanchez, 10/15/21, 7:43 PM EDT.          Attending   Admission Attestation       I have seen and examined the patient, performing an independent face-to-face diagnostic evaluation with plan of care reviewed and developed with the advanced practice clinician (APC).      Brief Summary Statement:   Mary Ann Cooper is a 89 y.o. female with history of breast cancer, dementia, DJD s/p recent kyphoplasty-- (gets most of care at [facility]) who presents to the ED today due to confusion/diarrhea as reported by her facility ([facility]). APC was able to speak with  daughter in law via phone, no family present during my visit and patient unable to participate in questions.     Upon arrival to the ED patient was noted to be in Atrial fibrillation RVR with HR of 121. This appears per family to be a ?new diagnosis  Currently she continues to be in afib but is rate controlled.  Workup in the ED reveals UA consistent with UTI.  CT of abdomen and pelvis is concerning for ? Sessile mass due to asymmetric thickening of the bladder dome.  Patient will be admitted to [facility] under the care of the Hospitalist for further evaluation and treatment.     Remainder of detailed HPI is as noted by APC and has been reviewed and/or edited by me for completeness.    Attending Physical Exam:  GEN- lying in the fetal position, cachetic, resting , chronically ill   HEENT- atraumatic, normocephalic, eomi, dry mucosa  NECK- supple, trachea midline, no masses  RESP: ctab, normal effort  CV: no murmurs,in afib, irregular   MSK: no edema noted, spontaneous movement of all extremities  NEURO: does not answer questions and not oriented  SKIN: no rashes      Brief Assessment/Plan :  See detailed assessment and plan developed with APC which I have reviewed and/or edited for completeness.        Admission Status: I believe that this patient meets INPATIENT status due to need for IV abx, further w/u of above, new onset afib  I feel patient’s risk for adverse outcomes and need for care warrant INPATIENT evaluation and I predict the patient’s care encounter to likely last beyond 2 midnights.        Shreya Asencio MD  10/15/21                      Electronically signed by Shreya Asencio MD at 10/15/21 2041         Current Facility-Administered Medications   Medication Dose Route Frequency Provider Last Rate Last Admin   • acetaminophen (TYLENOL) tablet 650 mg  650 mg Oral Q4H PRN Shante, Sharona, APRN   650 mg at 10/19/21 1222    Or   • acetaminophen (TYLENOL) 160 MG/5ML solution 650 mg  650 mg Oral Q4H PRN  Shante, Sharona, APRN   649.6 mg at 10/16/21 1441    Or   • acetaminophen (TYLENOL) suppository 650 mg  650 mg Rectal Q4H PRN Shante, Sharona, APRN   650 mg at 10/21/21 1531   • heparin (porcine) 5000 UNIT/ML injection 5,000 Units  5,000 Units Subcutaneous Q12H Shante, Sharona, APRN   5,000 Units at 10/21/21 2243   • levothyroxine (SYNTHROID, LEVOTHROID) tablet 75 mcg  75 mcg Oral Q AM Shante, Sharona, APRN   75 mcg at 10/21/21 0630   • magnesium (as) gluconate (MAGONATE) tablet 27 mg  27 mg Oral Daily Tarsha Santana PA-C   27 mg at 10/22/21 0925   • Magnesium Sulfate 2 gram Bolus, followed by 8 gram infusion (total Mg dose 10 grams)- Mg less than or equal to 1mg/dL  2 g Intravenous PRN Amy Chavira APRN        Or   • Magnesium Sulfate 2 gram / 50mL Infusion (GIVE X 3 BAGS TO EQUAL 6GM TOTAL DOSE) - Mg 1.1 - 1.5 mg/dl  2 g Intravenous PRN Amy Chavira APRN        Or   • Magnesium Sulfate 4 gram infusion- Mg 1.6-1.9 mg/dL  4 g Intravenous PRN Amy Chavira APRN 25 mL/hr at 10/21/21 1344 4 g at 10/21/21 1344   • melatonin tablet 5 mg  5 mg Oral Nightly PRN Nico Cabral PA-C   5 mg at 10/21/21 2243   • metoprolol tartrate (LOPRESSOR) half tablet 37.5 mg  37.5 mg Oral Q12H Tarsha Santana PA-C   37.5 mg at 10/22/21 0925   • mirtazapine (REMERON SOL-TAB) disintegrating tablet 15 mg  15 mg Oral Nightly Terry Sumner DO   15 mg at 10/21/21 2244   • potassium chloride (MICRO-K) CR capsule 40 mEq  40 mEq Oral PRN Shante, Sharona, APRN   40 mEq at 10/16/21 0628    Or   • potassium chloride (KLOR-CON) packet 40 mEq  40 mEq Oral PRN Shante, Sharona, APRN   40 mEq at 10/16/21 0059    Or   • potassium chloride 10 mEq in 100 mL IVPB  10 mEq Intravenous Q1H PRN Shante, Sharona, APRN 100 mL/hr at 10/21/21 1813 10 mEq at 10/21/21 1813   • potassium chloride (MICRO-K) CR capsule 20 mEq  20 mEq Oral Daily Tarsha Santana PA-C       • sodium chloride 0.9 % flush 10 mL  10 mL Intravenous  PRN Babar Heard MD   10 mL at 10/21/21 2243   • sodium chloride 0.9 % flush 10 mL  10 mL Intravenous PRN Ross Olsen APRN            Physician Progress Notes (last 72 hours)      Tarsha Santana PA-C at 10/21/21 0853              [facility] Medicine Services  PROGRESS NOTE    Patient Name: Mary Ann Cooper  : 3/31/1930  MRN: [id number]    Date of Admission: 10/15/2021  Primary Care Physician: Christi Gold MD    Subjective     CC: SVT     HPI:  In bed. Patient did not converse. Went into SVT today with 's - improved with IV adenosine and metoprolol. Patient was not symptomatic during this episode.     ROS:  Unable to obtain complete or reliable ROS due to mental status    Objective     Vital Signs:   Temp:  [97.8 °F (36.6 °C)-99.3 °F (37.4 °C)] 98.1 °F (36.7 °C)  Heart Rate:  [] 113  Resp:  [16] 16  BP: (103-134)/(76-94) 103/76     Physical Exam:  Constitutional: No acute distress. Eyes closed, did not converse   HENT: NCAT   Respiratory: Clear to auscultation bilaterally, respiratory effort normal on room air   Cardiovascular: Irregular rhythm, rate 100-120's without m/r/g   Gastrointestinal: Positive bowel sounds, nondistended   Musculoskeletal: No bilateral ankle edema  Psychiatric: Unable to assess   Neurologic: Moves all extremities with no focal deficits     Results Reviewed:  LAB RESULTS:      Lab 10/16/21  0255 10/15/21  1543   WBC 7.39 6.57   HEMOGLOBIN 15.2 15.6   HEMATOCRIT 44.2 45.3   PLATELETS 296 305   NEUTROS ABS 4.26 3.88   IMMATURE GRANS (ABS) 0.02 0.02   LYMPHS ABS 1.83 1.55   MONOS ABS 0.83 0.72   EOS ABS 0.39 0.34   MCV 88.9 89.5         Lab 10/20/21  1119 10/19/21  2009 10/17/21  0936 10/16/21  1526 10/16/21  0255 10/15/21  1543 10/15/21  1543   SODIUM 138  --  139  --  143  --  141   POTASSIUM 3.6  3.6 3.4* 3.8 4.2 3.8   < > 3.2*   CHLORIDE 105  --  104  --  105  --  103   CO2 23.0  --  24.0  --  28.0  --  29.0   ANION GAP 10.0  --   11.0  --  10.0  --  9.0   BUN 12  --  14  --  20  --  22   CREATININE 0.76  --  0.61  --  0.79  --  0.93   GLUCOSE 90  --  86  --  90  --  135*   CALCIUM 8.5  --  9.3  --  9.4  --  9.4   MAGNESIUM 2.5* 1.8  --   --  2.3  --   --    TSH  --   --   --   --  7.370*  --   --     < > = values in this interval not displayed.         Lab 10/15/21  1543   TOTAL PROTEIN 6.6   ALBUMIN 3.70   GLOBULIN 2.9   ALT (SGPT) 21   AST (SGOT) 30   BILIRUBIN 0.5   ALK PHOS 258*   LIPASE 69*         Lab 10/19/21  2009 10/15/21  1543   PROBNP  --  989.9   TROPONIN T <0.010 <0.010     Brief Urine Lab Results  (Last result in the past 365 days)      Color   Clarity   Blood   Leuk Est   Nitrite   Protein   CREAT   Urine HCG        10/15/21 1544 Yellow   Clear   Trace   Trace   Positive   100 mg/dL (2+)               Microbiology Results Abnormal     Procedure Component Value - Date/Time    COVID PRE-OP / PRE-PROCEDURE SCREENING ORDER (NO ISOLATION) - Swab, Nasopharynx [[id number]]  (Normal) Collected: 10/21/21 0802    Lab Status: Final result Specimen: Swab from Nasopharynx Updated: 10/21/21 0828    Narrative:      The following orders were created for panel order COVID PRE-OP / PRE-PROCEDURE SCREENING ORDER (NO ISOLATION) - Swab, Nasopharynx.  Procedure                               Abnormality         Status                     ---------                               -----------         ------                     COVID-19, ABBOTT IN-HOUS...[[id number]]  Normal              Final result                 Please view results for these tests on the individual orders.    COVID-19, ABBOTT IN-HOUSE,NASAL Swab (NO TRANSPORT MEDIA) 2 HR TAT - Swab, Nasopharynx [[id number]]  (Normal) Collected: 10/21/21 0802    Lab Status: Final result Specimen: Swab from Nasopharynx Updated: 10/21/21 0828     COVID19 Presumptive Negative    Narrative:      Fact sheet for providers: https://www.fda.gov/media/486848/download     Fact sheet for patients:  https://www.fda.gov/media/941537/download    Test performed by PCR.  If inconsistent with clinical signs and symptoms patient should be tested with different authorized molecular test.    Gastrointestinal Panel, PCR - Stool, Per Rectum [[id number]]  (Normal) Collected: 10/16/21 2027    Lab Status: Final result Specimen: Stool from Per Rectum Updated: 10/16/21 2153     Campylobacter Not Detected     Plesiomonas shigelloides Not Detected     Salmonella Not Detected     Vibrio Not Detected     Vibrio cholerae Not Detected     Yersinia enterocolitica Not Detected     Enteroaggregative E. coli (EAEC) Not Detected     Enteropathogenic E. coli (EPEC) Not Detected     Enterotoxigenic E. coli (ETEC) lt/st Not Detected     Shiga-like toxin-producing E. coli (STEC) stx1/stx2 Not Detected     Shigella/Enteroinvasive E. coli (EIEC) Not Detected     Cryptosporidium Not Detected     Cyclospora cayetanensis Not Detected     Entamoeba histolytica Not Detected     Giardia lamblia Not Detected     Adenovirus F40/41 Not Detected     Astrovirus Not Detected     Norovirus GI/GII Not Detected     Rotavirus A Not Detected     Sapovirus (I, II, IV or V) Not Detected    Clostridium Difficile Toxin - Stool, Per Rectum [[id number]]  (Normal) Collected: 10/16/21 2027    Lab Status: Final result Specimen: Stool from Per Rectum Updated: 10/16/21 2125    Narrative:      The following orders were created for panel order Clostridium Difficile Toxin - Stool, Per Rectum.  Procedure                               Abnormality         Status                     ---------                               -----------         ------                     Clostridium Difficile To...[[id number]]  Normal              Final result                 Please view results for these tests on the individual orders.    Clostridium Difficile Toxin, PCR - Stool, Per Rectum [[id number]]  (Normal) Collected: 10/16/21 2027    Lab Status: Final result Specimen: Stool from Per Rectum  Updated: 10/16/21 2125     C. Difficile Toxins by PCR Not Detected    Narrative:      Performance characteristics of test not established for patients <2 years of age.  Negative for Toxigenic C. Difficile    COVID PRE-OP / PRE-PROCEDURE SCREENING ORDER (NO ISOLATION) - Swab, Nasopharynx [[id number]]  (Normal) Collected: 10/15/21 1737    Lab Status: Final result Specimen: Swab from Nasopharynx Updated: 10/15/21 1807    Narrative:      The following orders were created for panel order COVID PRE-OP / PRE-PROCEDURE SCREENING ORDER (NO ISOLATION) - Swab, Nasopharynx.  Procedure                               Abnormality         Status                     ---------                               -----------         ------                     COVID-19 and FLU A/B PCR...[[id number]]  Normal              Final result                 Please view results for these tests on the individual orders.    COVID-19 and FLU A/B PCR - Swab, Nasopharynx [[id number]]  (Normal) Collected: 10/15/21 1737    Lab Status: Final result Specimen: Swab from Nasopharynx Updated: 10/15/21 1807     COVID19 Not Detected     Influenza A PCR Not Detected     Influenza B PCR Not Detected    Narrative:      Fact sheet for providers: https://www.fda.gov/media/560110/download    Fact sheet for patients: https://www.fda.gov/media/641587/download    Test performed by PCR.        No radiology results from the last 24 hrs    I have reviewed the medications:  Scheduled Meds:metoprolol tartrate, , ,   adenosine, 6 mg, Intravenous, Once  heparin (porcine), 5,000 Units, Subcutaneous, Q12H  levothyroxine, 75 mcg, Oral, Q AM  metoprolol tartrate, 25 mg, Oral, Q12H  mirtazapine, 15 mg, Oral, Nightly  sodium chloride, 500 mL, Intravenous, Once      Continuous Infusions:lactated ringers, 75 mL/hr, Last Rate: 75 mL/hr (10/21/21 0433)      PRN Meds:.acetaminophen **OR** acetaminophen **OR** acetaminophen  •  magnesium sulfate **OR** magnesium sulfate **OR** magnesium  sulfate  •  melatonin  •  potassium chloride **OR** potassium chloride **OR** potassium chloride  •  sodium chloride  •  [COMPLETED] Insert peripheral IV **AND** sodium chloride    Assessment & Plan     Active Hospital Problems    Diagnosis  POA   • **Acute cystitis without hematuria [N30.00]  Yes   • Severe malnutrition (CMS/HCC) [E43]  Yes   • Dementia (HCC) [F03.90]  Yes   • Hypothyroidism [E03.9]  Yes   • History of breast cancer [Z85.3]  Not Applicable   • Essential hypertension [I10]  Yes   • Chronic back pain [M54.9, G89.29]  Yes   • Atrial fibrillation (HCC) [I48.91]  Unknown      Resolved Hospital Problems   No resolved problems to display.     Brief Hospital Course to date:  Mary Ann Cooper is a 89 y.o. female nursing home resident with Hx of breast cancer, A. fib, dementia recently undergoing kyphoplasty 1 month ago at [facility]. She was brought to [facility] ED on 10/15/21 for diarrhea and AMS. She was found to have UTI and was treated with IV antibiotics. Despite treatment, she continues to have poor oral intake and is minimally interactive. She developed SVT on 10/21/21.     Enterobacter cloacae UTI  - Has completed 5 days of IV Rocephin     Asymmetric bladder wall thickening  - As seen on CT abdomen/pelvis  - Unclear clinical significance in the setting of UTI   - Urology has evaluated - recommend treating UTI, can follow up outpatient as necessary    Acute metabolic encephalopathy  Baseline dementia  Failure to thrive in the adult  - Continue Mirtazapine  - Palliative care following, daughter interested in speaking with hospice - planned for this afternoon  - Resides at [facility] care unit     Acute diarrhea, resolved     Presumed new onset atrial fibrillation  SVT  - Per family, no prior history. No anticoagulation per family request  - On Metoprolol 25mg BID  - Developed SVT with 's on 10/21, currently improved with adenosine 6mg IV x 1 dose and Metoprolol 5mg  IV, will continue to monitor today and make adjustments as needed  - Mag / K+ low-normal respite replacement - started PO supplement. Needs repeat labs within 7 days to verify doses are appropriate    Hypothyroidism  - Continue Synthroid    Hx of breast cancer, remote  - S/p BL mastectomy with reconstruction in   - Per records review, patient recently had LT breast fluid collection evaluated at [facility] that could not be drained    Chronic back pain  - S/p kyphoplasty at [facility] 2021    DVT prophylaxis:  Medical and mechanical DVT prophylaxis orders are present.     AM-PAC 6 Clicks Score (PT): 7 (10/20/21 1139)    Disposition: Back to [facility] as early as tomorrow     CODE STATUS:   Code Status and Medical Interventions:   Ordered at: 10/15/21 194     Limited Support to NOT Include:    Intubation     Level Of Support Discussed With:    Health Care Surrogate     Code Status:    No CPR     Medical Interventions (Level of Support Prior to Arrest):    Limited     Tarsha Santana PA-C  10/21/21                Electronically signed by Tarsha Santana PA-C at 10/21/21 1326     Nayeli Vega APRN at 10/20/21 190        Spoke to stepdaughter, Steph Espino, over the phone.   Updated on current clinical status with expected outcomes of continued functional decline, informed about choices with plans of care.  Patient's son, Willie, defers to Steph to assist with managing healthcare decisions.  Steph requests to have further information about hospice services.   Requested referral for hospice.   Nayeli Vega APRN  [phone]    Electronically signed by Nayeli Vega APRN at 10/20/21 191     Ozzie Souza MD at 10/20/21 9841              [facility] Medicine Services  PROGRESS NOTE    Patient Name: Mary Ann Cooper  : 3/31/1930  MRN: [id number]    Date of Admission: 10/15/2021  Primary Care Physician: Christi Gold MD    Subjective   Subjective     CC:  Follow-up  UTI    HPI:  Awake and alert, talking but confused.    ROS:  Limited    Objective   Objective     Vital Signs:   Temp:  [97.7 °F (36.5 °C)-98.3 °F (36.8 °C)] 97.9 °F (36.6 °C)  Heart Rate:  [] 86  Resp:  [14-16] 16  BP: ()/(58-88) 137/86     Physical Exam:  NAD, alert  OP clear, MMM  PERRL  Neck supple  RRR  CTAB  +BS, ND, NT, soft  Cachexia  BOOKER  No rashes  Talking today, more conversant but confused  Otherwise no change from 10/19    Results Reviewed:  LAB RESULTS:      Lab 10/16/21  0255 10/15/21  1543   WBC 7.39 6.57   HEMOGLOBIN 15.2 15.6   HEMATOCRIT 44.2 45.3   PLATELETS 296 305   NEUTROS ABS 4.26 3.88   IMMATURE GRANS (ABS) 0.02 0.02   LYMPHS ABS 1.83 1.55   MONOS ABS 0.83 0.72   EOS ABS 0.39 0.34   MCV 88.9 89.5         Lab 10/19/21  2009 10/17/21  0936 10/16/21  1526 10/16/21  0255 10/15/21  1543   SODIUM  --  139  --  143 141   POTASSIUM 3.4* 3.8 4.2 3.8 3.2*   CHLORIDE  --  104  --  105 103   CO2  --  24.0  --  28.0 29.0   ANION GAP  --  11.0  --  10.0 9.0   BUN  --  14  --  20 22   CREATININE  --  0.61  --  0.79 0.93   GLUCOSE  --  86  --  90 135*   CALCIUM  --  9.3  --  9.4 9.4   MAGNESIUM 1.8  --   --  2.3  --    TSH  --   --   --  7.370*  --          Lab 10/15/21  1543   TOTAL PROTEIN 6.6   ALBUMIN 3.70   GLOBULIN 2.9   ALT (SGPT) 21   AST (SGOT) 30   BILIRUBIN 0.5   ALK PHOS 258*   LIPASE 69*         Lab 10/19/21  2009 10/15/21  1543   PROBNP  --  989.9   TROPONIN T <0.010 <0.010                 Brief Urine Lab Results  (Last result in the past 365 days)      Color   Clarity   Blood   Leuk Est   Nitrite   Protein   CREAT   Urine HCG        10/15/21 1544 Yellow   Clear   Trace   Trace   Positive   100 mg/dL (2+)                 Microbiology Results Abnormal     Procedure Component Value - Date/Time    Gastrointestinal Panel, PCR - Stool, Per Rectum [[id number]]  (Normal) Collected: 10/16/21 2027    Lab Status: Final result Specimen: Stool from Per Rectum Updated: 10/16/21 3344      Campylobacter Not Detected     Plesiomonas shigelloides Not Detected     Salmonella Not Detected     Vibrio Not Detected     Vibrio cholerae Not Detected     Yersinia enterocolitica Not Detected     Enteroaggregative E. coli (EAEC) Not Detected     Enteropathogenic E. coli (EPEC) Not Detected     Enterotoxigenic E. coli (ETEC) lt/st Not Detected     Shiga-like toxin-producing E. coli (STEC) stx1/stx2 Not Detected     Shigella/Enteroinvasive E. coli (EIEC) Not Detected     Cryptosporidium Not Detected     Cyclospora cayetanensis Not Detected     Entamoeba histolytica Not Detected     Giardia lamblia Not Detected     Adenovirus F40/41 Not Detected     Astrovirus Not Detected     Norovirus GI/GII Not Detected     Rotavirus A Not Detected     Sapovirus (I, II, IV or V) Not Detected    Clostridium Difficile Toxin - Stool, Per Rectum [[id number]]  (Normal) Collected: 10/16/21 2027    Lab Status: Final result Specimen: Stool from Per Rectum Updated: 10/16/21 2125    Narrative:      The following orders were created for panel order Clostridium Difficile Toxin - Stool, Per Rectum.  Procedure                               Abnormality         Status                     ---------                               -----------         ------                     Clostridium Difficile To...[[id number]]  Normal              Final result                 Please view results for these tests on the individual orders.    Clostridium Difficile Toxin, PCR - Stool, Per Rectum [[id number]]  (Normal) Collected: 10/16/21 2027    Lab Status: Final result Specimen: Stool from Per Rectum Updated: 10/16/21 2125     C. Difficile Toxins by PCR Not Detected    Narrative:      Performance characteristics of test not established for patients <2 years of age.  Negative for Toxigenic C. Difficile    COVID PRE-OP / PRE-PROCEDURE SCREENING ORDER (NO ISOLATION) - Swab, Nasopharynx [[id number]]  (Normal) Collected: 10/15/21 1737    Lab Status: Final result Specimen:  Swab from Nasopharynx Updated: 10/15/21 1807    Narrative:      The following orders were created for panel order COVID PRE-OP / PRE-PROCEDURE SCREENING ORDER (NO ISOLATION) - Swab, Nasopharynx.  Procedure                               Abnormality         Status                     ---------                               -----------         ------                     COVID-19 and FLU A/B PCR...[[id number]]  Normal              Final result                 Please view results for these tests on the individual orders.    COVID-19 and FLU A/B PCR - Swab, Nasopharynx [[id number]]  (Normal) Collected: 10/15/21 1737    Lab Status: Final result Specimen: Swab from Nasopharynx Updated: 10/15/21 1807     COVID19 Not Detected     Influenza A PCR Not Detected     Influenza B PCR Not Detected    Narrative:      Fact sheet for providers: https://www.fda.gov/media/329987/download    Fact sheet for patients: https://www.fda.gov/media/653054/download    Test performed by PCR.          No radiology results from the last 24 hrs        I have reviewed the medications:  Scheduled Meds:heparin (porcine), 5,000 Units, Subcutaneous, Q12H  levothyroxine, 75 mcg, Oral, Q AM  metoprolol tartrate, 25 mg, Oral, Q12H  mirtazapine, 15 mg, Oral, Nightly      Continuous Infusions:lactated ringers, 75 mL/hr, Last Rate: 75 mL/hr (10/19/21 2343)      PRN Meds:.•  acetaminophen **OR** acetaminophen **OR** acetaminophen  •  magnesium sulfate **OR** magnesium sulfate **OR** magnesium sulfate  •  melatonin  •  potassium chloride **OR** potassium chloride **OR** potassium chloride  •  sodium chloride  •  [COMPLETED] Insert peripheral IV **AND** sodium chloride    Assessment/Plan   Assessment & Plan     Active Hospital Problems    Diagnosis  POA   • **Acute cystitis without hematuria [N30.00]  Yes   • Severe malnutrition (CMS/HCC) [E43]  Yes   • Dementia (HCC) [F03.90]  Yes   • Hypothyroidism [E03.9]  Yes   • History of breast cancer [Z85.3]  Not Applicable    • Essential hypertension [I10]  Yes   • Chronic back pain [M54.9, G89.29]  Yes   • Atrial fibrillation (HCC) [I48.91]  Unknown      Resolved Hospital Problems   No resolved problems to display.        Brief Hospital Course to date:  Mary Ann Cooper is a 89 y.o. female nursing home resident with Hx of breast cancer, A. fib, dementia recently undergoing kyphoplasty 1 month ago at [facility] who has brought to the hospital for evaluation of diarrhea and confusion above baseline; she has been treated with IV antibiotics for presumed urinary infection with stable vital/labs, however she has had minimal oral intake and is minimally interactive    Assessment/plan    Enterobacter cloacae UTI  --completed rocephin    Asymmetric bladder wall thickening  --unclear significance  --possibly due to UTI, outpatient follow up with urology prn  -Unclear clinical significance at this time in the setting of presumed acute cystitis; completed antibiotics  -Seen by urology, recommend treating UTI and can follow-up outpatient as necessary    Acute metabolic encephalopathy  Baseline dementia  Failure to thrive in the adult  --on low dose remeron, team d/w family, needs placement, palliative care assistance    Acute diarrhea, improved  -No further diarrhea reported since admission    Presumed new onset atrial fibrillation  --no prior history  --no AC per family request  --on BB    Hypothyroidism  -Continue Synthroid    Hx of breast cancer, remote  -S/p BL mastectomy with reconstruction in 1989  -Per documentation recently had LT breast fluid collection evaluated at [facility] that could not be drained    Chronic back pain  -S/p kyphoplasty at [facility] 9/2021      DVT prophylaxis:  Medical and mechanical DVT prophylaxis orders are present.       AM-PAC 6 Clicks Score (PT): 8 (10/19/21 0800)    Disposition: Needs placement, palliative care assistance.    CODE STATUS:   Code Status and Medical Interventions:   Ordered at: 10/15/21 1946      Limited Support to NOT Include:    Intubation     Level Of Support Discussed With:    Health Care Surrogate     Code Status:    No CPR     Medical Interventions (Level of Support Prior to Arrest):    Limited       Ozzie Souza MD  10/20/21                Electronically signed by Ozzie Souza MD at 10/20/21 0828       Consult Notes (last 72 hours)  Notes from 10/19/21 1022 through 10/22/21 1022   No notes of this type exist for this encounter.

## 2021-10-22 NOTE — PROGRESS NOTES
Continued Stay Note  [facility]     Patient Name: Mary Ann Cooper  MRN: [id number]  Today's Date: 10/22/2021    Admit Date: 10/15/2021     Discharge Plan     Row Name 10/22/21 0834       Plan    Plan SNF    Plan Comments Message left for the pt.’s daughter, Steph, last night. Steph has not returned the call as of this morning. Hospice will attempt to reach out to Steph, later this day. If further assistance is needed please call.               Discharge Codes    No documentation.                     Miriam Alejandro

## 2021-10-22 NOTE — CASE MANAGEMENT/SOCIAL WORK
Case Management Discharge Note      Final Note: Pt to discharge to skilled bed at [facility] today via [facility] ambulance at 1330. PCS form on pt's chart. Nurse to call report to [phone] at [place] will access discharge summary through [employer]. Pt's meds to be e-scribed to [facility] in [place]. Pt's family aware and agreeable to discharge plans, no further discharge needs noted.    Provided Post Acute Provider List?: N/A  Provided Post Acute Provider Quality & Resource List?: N/A    Selected Continued Care - Admitted Since 10/15/2021     Destination Coordination complete.    Service Provider Selected Services Address Phone Fax Patient Preferred    [facility]  Skilled Nursing [address], [facility] [address] [phone] --                      Transportation Services  Ambulance: [facility]    Final Discharge Disposition Code: 03 - skilled nursing facility (SNF)

## 2021-10-22 NOTE — DISCHARGE PLACEMENT REQUEST
Mary Ann Cooper (89 y.o. Female)   D/c summary            Date of Birth Social Security Number Address Home Phone MRN    1930  [address] [place] [id number] [phone] [id number]    Jehovah's witness Marital Status             Rastafarian        Admission Date Admission Type Admitting Provider Attending Provider Department, Room/Bed    10/15/21 Emergency Frances Llanos MD Hall, Holly, MD [facility] 2F, S203/1    Discharge Date Discharge Disposition Discharge Destination           Long Term Care (DC - External)              Attending Provider: Frances Llanos MD    Allergies: Codeine, Levothyroxine, Penicillins    Isolation: None   Infection: None   Code Status: No CPR   Advance Care Planning Activity    Ht: --   Wt: 45.3 kg (99 lb 14.4 oz)    Admission Cmt: None   Principal Problem: Acute cystitis without hematuria [N30.00]                 Active Insurance as of 10/15/2021     Primary Coverage     Payor Plan Insurance Group Employer/Plan Group    MEDICARE MEDICARE A & B      Payor Plan Address Payor Plan Phone Number Payor Plan Fax Number Effective Dates    [address] [phone]  3/1/1995 - None Entered    [place] [id number]       Subscriber Name Subscriber Birth Date Member ID       MARY ANN COOPER 3/31/1930 [id number]                 Emergency Contacts      (Rel.) Home Phone Work Phone Mobile Phone    Willie Moss (Son) -- -- [phone]    Steph Espino (Daughter) -- -- [phone]    KennethIsadora (Daughter) -- -- [phone]                 Discharge Summary      Frances Llanos MD at 10/22/21 1250              [facility] Medicine Services  TRANSFER SUMMARY    Patient Name: Mary Ann Cooper  : 3/31/1930  MRN: [id number]    Date of Admission: 10/15/2021  Date of Discharge:  10/22/21    Length of Stay: 7  Primary Care Physician: Christi Gold MD    Consults     Date and Time Order Name Status Description    10/19/2021  8:50 AM Inpatient  Palliative Care MD Consult Completed     10/16/2021 12:35 AM Inpatient Urology Consult      10/16/2021 12:35 AM Inpatient Urology Consult Completed           Hospital Course     Presenting Problem:   Acute cystitis without hematuria [N30.00]    Active Hospital Problems    Diagnosis  POA   • Severe malnutrition (CMS/HCC) [E43]  Yes   • Dementia (HCC) [F03.90]  Yes   • Hypothyroidism [E03.9]  Yes   • History of breast cancer [Z85.3]  Not Applicable   • Essential hypertension [I10]  Yes   • Chronic back pain [M54.9, G89.29]  Yes   • Atrial fibrillation (HCC) [I48.91]  Yes      Resolved Hospital Problems    Diagnosis Date Resolved POA   • **Acute cystitis without hematuria [N30.00] 10/22/2021 Yes          Hospital Course:  Mary Ann Cooper is a 89 y.o. female nursing home resident with Hx of breast cancer, A. fib, dementia recently undergoing kyphoplasty 1 month ago at [facility]. She was brought to [facility] ED on 10/15/21 for diarrhea and AMS. She was found to have UTI and was treated with IV antibiotics. Despite treatment, she continued to have poor oral intake and was minimally interactive, however now that antibiotic course completed she is much improved near baseline and appropriate to return to her long term care facility.      Enterobacter cloacae UTI  - Has completed 5 days of IV Rocephin       Acute metabolic encephalopathy  Baseline dementia  Failure to thrive in the adult  - Continue Mirtazapine  - Resides at [facility] care unit      Acute diarrhea, resolved      Presumed new onset paroxysmal atrial fibrillation  NSVT episode x1  - No anticoagulation per family request due to age and goals of care  - likely related to low K+ and Mg++ which have been replaced     Hypothyroidism  - Continue Synthroid     Hx of breast cancer, remote  - S/p BL mastectomy with reconstruction in 1989  - Per records review, patient recently had LT breast fluid collection evaluated at [facility] that  could not be drained     Chronic back pain  - S/p kyphoplasty at [facility] 9/2021            Discharge Follow Up Recommendations for outpatient labs/diagnostics:  - repeat K+ and Mg++ in ~5-7 days, patient has been started on chronic supplementation    Day of Discharge     HPI:   Up to chair eating lunch, no new events or complaint.     Vital Signs:   Temp:  [97.7 °F (36.5 °C)-99.6 °F (37.6 °C)] 99.6 °F (37.6 °C)  Heart Rate:  [] 69  Resp:  [14-18] 14  BP: ()/() 108/78     Physical Exam:  Constitutional: No acute distress, awake, alert, nontoxic, thin body habitus  Respiratory: Good effort, nonlabored respirations   Cardiovascular: NSr  Musculoskeletal: No peripheral edema, normal muscle tone for age      Pertinent Results     Results from last 7 days   Lab Units 10/22/21  1121 10/21/21  0848 10/20/21  1119 10/19/21  2009 10/17/21  0936 10/16/21  1526 10/16/21  0255 10/15/21  1543 10/15/21  1543   WBC 10*3/mm3  --   --   --   --   --   --  7.39  --  6.57   HEMOGLOBIN g/dL  --   --   --   --   --   --  15.2  --  15.6   HEMATOCRIT %  --   --   --   --   --   --  44.2  --  45.3   PLATELETS 10*3/mm3  --   --   --   --   --   --  296  --  305   SODIUM mmol/L 140 140 138  --  139  --  143  --  141   POTASSIUM mmol/L 3.9 3.6 3.6  3.6 3.4* 3.8 4.2 3.8   < > 3.2*   CHLORIDE mmol/L 106 107 105  --  104  --  105  --  103   CO2 mmol/L 24.0 21.0* 23.0  --  24.0  --  28.0  --  29.0   BUN mg/dL 11 11 12  --  14  --  20  --  22   CREATININE mg/dL 0.66 0.66 0.76  --  0.61  --  0.79  --  0.93   GLUCOSE mg/dL 84 117* 90  --  86  --  90  --  135*   CALCIUM mg/dL 8.7 8.4 8.5  --  9.3  --  9.4  --  9.4    < > = values in this interval not displayed.     Results from last 7 days   Lab Units 10/15/21  1543   BILIRUBIN mg/dL 0.5   ALK PHOS U/L 258*   ALT (SGPT) U/L 21   AST (SGOT) U/L 30           Invalid input(s): TG, LDLCALC, LDLREALC  Results from last 7 days   Lab Units 10/16/21  0255   TSH uIU/mL 7.370*      Brief Urine Lab Results  (Last result in the past 365 days)      Color   Clarity   Blood   Leuk Est   Nitrite   Protein   CREAT   Urine HCG        10/15/21 1544 Yellow   Clear   Trace   Trace   Positive   100 mg/dL (2+)                 Microbiology Results Abnormal     Procedure Component Value - Date/Time    COVID PRE-OP / PRE-PROCEDURE SCREENING ORDER (NO ISOLATION) - Swab, Nasopharynx [[id number]]  (Normal) Collected: 10/20/21 1037    Lab Status: Final result Specimen: Swab from Nasopharynx Updated: 10/21/21 1414    Narrative:      The following orders were created for panel order COVID PRE-OP / PRE-PROCEDURE SCREENING ORDER (NO ISOLATION) - Swab, Nasopharynx.  Procedure                               Abnormality         Status                     ---------                               -----------         ------                     COVID-19, APTIMA PANTHER...[[id number]]  Normal              Final result                 Please view results for these tests on the individual orders.    COVID-19, APTIMA PANTHER SARMAD IN-HOUSE NP/OP SWAB IN UTM/VTM/SALINE TRANSPORT MEDIA 24HR TAT - Swab, Nasopharynx [[id number]]  (Normal) Collected: 10/20/21 1037    Lab Status: Final result Specimen: Swab from Nasopharynx Updated: 10/21/21 1414     COVID19 Not Detected    Narrative:      Fact sheet for providers: https://www.fda.gov/media/059163/download     Fact sheet for patients: https://www.fda.gov/media/377976/download    Test performed by RT PCR.    COVID PRE-OP / PRE-PROCEDURE SCREENING ORDER (NO ISOLATION) - Swab, Nasopharynx [[id number]]  (Normal) Collected: 10/21/21 0802    Lab Status: Final result Specimen: Swab from Nasopharynx Updated: 10/21/21 0828    Narrative:      The following orders were created for panel order COVID PRE-OP / PRE-PROCEDURE SCREENING ORDER (NO ISOLATION) - Swab, Nasopharynx.  Procedure                               Abnormality         Status                     ---------                                -----------         ------                     COVID-19, ABBOTT IN-HOUS...[[id number]]  Normal              Final result                 Please view results for these tests on the individual orders.    COVID-19, ABBOTT IN-HOUSE,NASAL Swab (NO TRANSPORT MEDIA) 2 HR TAT - Swab, Nasopharynx [[id number]]  (Normal) Collected: 10/21/21 0802    Lab Status: Final result Specimen: Swab from Nasopharynx Updated: 10/21/21 0828     COVID19 Presumptive Negative    Narrative:      Fact sheet for providers: https://www.fda.gov/media/910875/download     Fact sheet for patients: https://www.fda.gov/media/846557/download    Test performed by PCR.  If inconsistent with clinical signs and symptoms patient should be tested with different authorized molecular test.    Gastrointestinal Panel, PCR - Stool, Per Rectum [[id number]]  (Normal) Collected: 10/16/21 2027    Lab Status: Final result Specimen: Stool from Per Rectum Updated: 10/16/21 2153     Campylobacter Not Detected     Plesiomonas shigelloides Not Detected     Salmonella Not Detected     Vibrio Not Detected     Vibrio cholerae Not Detected     Yersinia enterocolitica Not Detected     Enteroaggregative E. coli (EAEC) Not Detected     Enteropathogenic E. coli (EPEC) Not Detected     Enterotoxigenic E. coli (ETEC) lt/st Not Detected     Shiga-like toxin-producing E. coli (STEC) stx1/stx2 Not Detected     Shigella/Enteroinvasive E. coli (EIEC) Not Detected     Cryptosporidium Not Detected     Cyclospora cayetanensis Not Detected     Entamoeba histolytica Not Detected     Giardia lamblia Not Detected     Adenovirus F40/41 Not Detected     Astrovirus Not Detected     Norovirus GI/GII Not Detected     Rotavirus A Not Detected     Sapovirus (I, II, IV or V) Not Detected    Clostridium Difficile Toxin - Stool, Per Rectum [[id number]]  (Normal) Collected: 10/16/21 2027    Lab Status: Final result Specimen: Stool from Per Rectum Updated: 10/16/21 2125    Narrative:      The following orders  were created for panel order Clostridium Difficile Toxin - Stool, Per Rectum.  Procedure                               Abnormality         Status                     ---------                               -----------         ------                     Clostridium Difficile To...[[id number]]  Normal              Final result                 Please view results for these tests on the individual orders.    Clostridium Difficile Toxin, PCR - Stool, Per Rectum [[id number]]  (Normal) Collected: 10/16/21 2027    Lab Status: Final result Specimen: Stool from Per Rectum Updated: 10/16/21 2125     C. Difficile Toxins by PCR Not Detected    Narrative:      Performance characteristics of test not established for patients <2 years of age.  Negative for Toxigenic C. Difficile    COVID PRE-OP / PRE-PROCEDURE SCREENING ORDER (NO ISOLATION) - Swab, Nasopharynx [[id number]]  (Normal) Collected: 10/15/21 1737    Lab Status: Final result Specimen: Swab from Nasopharynx Updated: 10/15/21 1807    Narrative:      The following orders were created for panel order COVID PRE-OP / PRE-PROCEDURE SCREENING ORDER (NO ISOLATION) - Swab, Nasopharynx.  Procedure                               Abnormality         Status                     ---------                               -----------         ------                     COVID-19 and FLU A/B PCR...[[id number]]  Normal              Final result                 Please view results for these tests on the individual orders.    COVID-19 and FLU A/B PCR - Swab, Nasopharynx [[id number]]  (Normal) Collected: 10/15/21 1737    Lab Status: Final result Specimen: Swab from Nasopharynx Updated: 10/15/21 1807     COVID19 Not Detected     Influenza A PCR Not Detected     Influenza B PCR Not Detected    Narrative:      Fact sheet for providers: https://www.fda.gov/media/032435/download    Fact sheet for patients: https://www.fda.gov/media/761235/download    Test performed by PCR.          Imaging Results (All)      "Procedure Component Value Units Date/Time    CT Chest Without Contrast Diagnostic [[id number]] Collected: 10/15/21 1808     Updated: 10/16/21 2321    Narrative:      EXAMINATION: CT CHEST WO CONTRAST DIAGNOSTIC, CT ABDOMEN/ PELVIS WO  CONTRAST - 10/15/2021      INDICATION: Generalized weakness, worsening.     TECHNIQUE: 5 mm unenhanced images through the chest, abdomen and pelvis.     The radiation dose reduction device was turned on for each scan per the  ALARA (As Low as Reasonably Achievable) protocol.     COMPARISON: None     FINDINGS: History indicates worsening generalized weakness.     CT SCAN OF THE CHEST WITHOUT CONTRAST: No significant mediastinal hilar  or axillary adenopathy is appreciated. There is no evidence of  pericardial or pleural effusion. Lung window images show densely  calcified 13 mm right lower lobe nodule, and mild linear scarring in  both lung bases. No evidence of pneumonia is identified. Note is made of  previous lower thoracic kyphoplasties.       Impression:      No evidence of active chest disease.     ABDOMEN AND PELVIS CT SCAN WITHOUT CONTRAST: No significant  abnormalities are seen of the liver, spleen, pancreas, adrenal glands,  or kidneys for a non-IV contrast enhanced exam. Bowel loops are not  significantly distended, although there is slightly increased small  bowel gas. No bowel wall edema or pneumatosis is seen. Bladder is  distended, and appears slightly thick-walled fluid for the degree of  distention present, in particular, the anterior wall of the bladder  appears thickened, almost masslike as seen on sagittal image #68 series  900. Maximal oblique diameter of the bladder is 13 mm. Uterus and  ovaries are not identified, either absent or atrophic. No intrapelvic  free fluid or inflammatory change is seen. Bony structures appear to be  intact. Sclerotic changes of the left sacrum suggest an old healed  insufficiency fracture here.     IMPRESSION:   1. Slightly \"gassy\" " appearance the abdomen but no evidence of  significant ileus or obstruction.     2. Distended bladder, with asymmetric thickening of the bladder dome, at  least potentially a sessile mass.     3. No evidence of acute inflammatory process or other clearly acute  intra-abdominal or intrapelvic disease.     4. Pattern of sclerosis of the left sacrum suggesting old healed sacral  insufficiency fracture.     DICTATED:   10/15/2021  EDITED/ls :   10/15/2021        This report was finalized on 10/16/2021 11:18 PM by Dr. Ozzie Velasquez MD.       CT Abdomen Pelvis Without Contrast [[id number]] Collected: 10/15/21 1808     Updated: 10/16/21 2321    Narrative:      EXAMINATION: CT CHEST WO CONTRAST DIAGNOSTIC, CT ABDOMEN/ PELVIS WO  CONTRAST - 10/15/2021      INDICATION: Generalized weakness, worsening.     TECHNIQUE: 5 mm unenhanced images through the chest, abdomen and pelvis.     The radiation dose reduction device was turned on for each scan per the  ALARA (As Low as Reasonably Achievable) protocol.     COMPARISON: None     FINDINGS: History indicates worsening generalized weakness.     CT SCAN OF THE CHEST WITHOUT CONTRAST: No significant mediastinal hilar  or axillary adenopathy is appreciated. There is no evidence of  pericardial or pleural effusion. Lung window images show densely  calcified 13 mm right lower lobe nodule, and mild linear scarring in  both lung bases. No evidence of pneumonia is identified. Note is made of  previous lower thoracic kyphoplasties.       Impression:      No evidence of active chest disease.     ABDOMEN AND PELVIS CT SCAN WITHOUT CONTRAST: No significant  abnormalities are seen of the liver, spleen, pancreas, adrenal glands,  or kidneys for a non-IV contrast enhanced exam. Bowel loops are not  significantly distended, although there is slightly increased small  bowel gas. No bowel wall edema or pneumatosis is seen. Bladder is  distended, and appears slightly thick-walled fluid for the degree  "of  distention present, in particular, the anterior wall of the bladder  appears thickened, almost masslike as seen on sagittal image #68 series  900. Maximal oblique diameter of the bladder is 13 mm. Uterus and  ovaries are not identified, either absent or atrophic. No intrapelvic  free fluid or inflammatory change is seen. Bony structures appear to be  intact. Sclerotic changes of the left sacrum suggest an old healed  insufficiency fracture here.     IMPRESSION:   1. Slightly \"gassy\" appearance the abdomen but no evidence of  significant ileus or obstruction.     2. Distended bladder, with asymmetric thickening of the bladder dome, at  least potentially a sessile mass.     3. No evidence of acute inflammatory process or other clearly acute  intra-abdominal or intrapelvic disease.     4. Pattern of sclerosis of the left sacrum suggesting old healed sacral  insufficiency fracture.     DICTATED:   10/15/2021  EDITED/ls :   10/15/2021        This report was finalized on 10/16/2021 11:18 PM by Dr. Ozzie Velasquez MD.       CT Head Without Contrast [[id number]] Collected: 10/15/21 1802     Updated: 10/16/21 2224    Narrative:      EXAMINATION: CT HEAD WO CONTRAST - 10/15/2021     INDICATION: Dementia.     TECHNIQUE: 5 mm unenhanced images through the brain     The radiation dose reduction device was turned on for each scan per the  ALARA (As Low as Reasonably Achievable) protocol.     COMPARISON: None     FINDINGS: The calvarium appears intact. The included paranasal sinuses  and mastoids appear clear. Soft tissue window images show advanced  generalized cerebral atrophy, expected for age. There is no evidence of  hemorrhage, contusion, edema, mass or mass effect, hydrocephalus, or  abnormal extra-axial collection.       Impression:      Age appropriate generalized cerebral atrophy. No evidence of  acute intracranial disease is seen.     DICTATED:   10/15/2021  EDITED/ls :   10/15/2021     This report was finalized on " 10/16/2021 10:21 PM by Dr. Ozzie Velasquez MD.                     Discharge Details        Discharge Medications      New Medications      Instructions Start Date   magnesium (as) gluconate 500 (27 Mg) MG tablet  Commonly known as: MAGONATE   27 mg, Oral, Daily   Start Date: October 23, 2021     Metoprolol Tartrate 37.5 MG tablet   37.5 mg, Oral, Every 12 Hours Scheduled      mirtazapine 15 MG disintegrating tablet  Commonly known as: REMERON SOL-TAB   15 mg, Translingual, Nightly      potassium chloride 10 MEQ CR capsule  Commonly known as: MICRO-K   20 mEq, Oral, Daily   Start Date: October 23, 2021        Continue These Medications      Instructions Start Date   levothyroxine 75 MCG tablet  Commonly known as: SYNTHROID, LEVOTHROID   75 mcg, Oral, Daily         Stop These Medications    metoprolol succinate XL 25 MG 24 hr tablet  Commonly known as: TOPROL-XL            Allergies   Allergen Reactions   • Codeine Unknown - High Severity   • Levothyroxine Unknown - Low Severity   • Penicillins Unknown - High Severity     ** tolerates ceftriaxone **         Discharge Disposition:  Long Term Care (DC - External)    Discharge Diet:  Diet Order   Procedures   • Diet Soft Texture; Ground; Thin       CODE STATUS:    Code Status and Medical Interventions:   Ordered at: 10/15/21 1946     Limited Support to NOT Include:    Intubation     Level Of Support Discussed With:    Health Care Surrogate     Code Status:    No CPR     Medical Interventions (Level of Support Prior to Arrest):    Limited         No future appointments.    Additional Instructions for the Follow-ups that You Need to Schedule     Discharge Follow-up with PCP   As directed       Currently Documented PCP:    Christi Gold MD    PCP Phone Number:    [phone]     Follow Up Details: within 1 week for Transiton of Care and repeat electrollytes -- started on K+ and Mg++ supplements this hospital stay               Time Spent on Discharge: I spent  35   minutes on this discharge activity which included: face-to-face encounter with the patient, reviewing the data in the system, coordination of the care with the nursing staff as well as consultants, documentation, and entering orders.        Electronically signed by Frances Llanos MD at 10/22/21 1257

## 2021-10-22 NOTE — PLAN OF CARE
Problem: Adult Inpatient Plan of Care  Goal: Plan of Care Review  Outcome: Met  Flowsheets (Taken 10/22/2021 5211)  Outcome Summary: VSS, controlled a fib. RA. Report called to Merary Ferris DC via EMS   Goal Outcome Evaluation:              Outcome Summary: VSS, controlled a fib. RA. Report called to Merary Ferris DC via EMS

## 2021-12-05 PROBLEM — I95.9 SEPSIS ASSOCIATED HYPOTENSION (HCC): Status: ACTIVE | Noted: 2021-01-01

## 2021-12-05 PROBLEM — J96.01 ACUTE RESPIRATORY FAILURE WITH HYPOXIA (HCC): Status: ACTIVE | Noted: 2021-01-01

## 2021-12-05 PROBLEM — I50.21 ACUTE SYSTOLIC HEART FAILURE (HCC): Status: ACTIVE | Noted: 2021-01-01

## 2021-12-05 PROBLEM — A41.9 SEPSIS ASSOCIATED HYPOTENSION (HCC): Status: ACTIVE | Noted: 2021-01-01

## 2021-12-05 PROBLEM — J18.9 SEPSIS DUE TO PNEUMONIA (HCC): Status: ACTIVE | Noted: 2021-01-01

## 2021-12-05 PROBLEM — A41.9 SEPSIS DUE TO PNEUMONIA (HCC): Status: ACTIVE | Noted: 2021-01-01

## 2021-12-05 NOTE — PROGRESS NOTES
[facility] Medicine Services  ADMISSION FOLLOW-UP NOTE          Patient admitted after midnight, H&P by my partner performed earlier on today's date reviewed.  Interim findings, labs, and charting also reviewed.        The [facility] Problem List has been managed and updated to include any new diagnoses:  Active Hospital Problems    Diagnosis  POA   • Acute respiratory failure with hypoxia (HCC) [J96.01]  Yes   • Sepsis associated hypotension (HCC) [A41.9, I95.9]  Unknown   • Sepsis due to pneumonia (HCC) [J18.9, A41.9]  Unknown   • Acute systolic heart failure (HCC) [I50.21]  Unknown   • Essential hypertension [I10]  Yes   • Dementia (HCC) [F03.90]  Yes   • Hypothyroidism [E03.9]  Yes   • Atrial fibrillation (HCC) [I48.91]  Yes      Resolved Hospital Problems   No resolved problems to display.         ADDITIONAL PLAN:  - detailed assessment and plan from admission reviewed    Acute respiratory failure with hypoxia  Acute systolic heart failure  Sepsis related to pneumonia  Hypotension  -Chest x-ray consolidation in the right upper lobe lesion on the left  -Currently too unstable for CTA  -Point-of-care ultrasound with EF 20%; D-dimer greater than 20  -Patient likely has PE causing significant hemodynamic instability  -Reviewed goals of care with son and daughter-in-law at bedside as well as son on the phone.  Both are in agreement for a comfort approach.  -We will hold on dopamine due to intermittent tachycardia  -Discontinue CTA  -We will continue cefepime and Vanco  -Hospice consult  --Hospice is unable to see patient to later today.  Reviewed plans for comfort medication with family and they are agreeable.  -- PRN diluadid and ativan available  -- Currently tolerating BiPAP but will change to nasal cannula if     Fall  -CT head with no acute findings    Rebeca Rankin,   12/05/21

## 2021-12-05 NOTE — SIGNIFICANT NOTE
12/05/21 1538   SLP Deferred Reason   SLP Deferred Reason   (Spoke with MD. Plan is for comfort care. MD has requested input re: appropriate comfort diet and requested SLP see pt tomorrow.)

## 2021-12-05 NOTE — PROGRESS NOTES
Continued Stay Note  [facility]     Patient Name: Mary Ann Cooper  MRN: [id number]  Today's Date: 12/5/2021    Admit Date: 12/5/2021     Discharge Plan     Row Name 12/05/21 1438       Plan    Plan Hospice meeting for inpatient evaluation Monday 12/6 1100    Plan Comments Met with family and discussed inpatient hospice services.  They are interested in this and agree to meeting with inpatient hospice team to discuss admission on Monday 12/6 at 1100.               Discharge Codes    No documentation.                     Candi Day RN

## 2021-12-05 NOTE — DISCHARGE PLACEMENT REQUEST
"Mary Ann Cooper (89 y.o. Female)             Date of Birth Social Security Number Address Home Phone MRN    03/31/1930  [address] [place] [id number] [phone] [id number]    Quaker Marital Status             Islam        Admission Date Admission Type Admitting Provider Attending Provider Department, Room/Bed    12/5/21 Emergency Rebeca Rankin DO Roesch, Lyndsey, DO [facility] 5B, N549/1    Discharge Date Discharge Disposition Discharge Destination                         Attending Provider: Rebeca Rankin DO    Allergies: Codeine, Penicillins, Propoxyphene    Isolation: None   Infection: None   Code Status: No CPR   Advance Care Planning Activity    Ht: 160 cm (63\")   Wt: 41.4 kg (91 lb 3.2 oz)    Admission Cmt: None   Principal Problem: None                Active Insurance as of 12/5/2021     Primary Coverage     Payor Plan Insurance Group Employer/Plan Group    MEDICARE MEDICARE A & B      Payor Plan Address Payor Plan Phone Number Payor Plan Fax Number Effective Dates    [address] [phone]  3/1/1995 - None Entered    [place] [address]       Subscriber Name Subscriber Birth Date Member ID       MARY ANN COOPER 3/31/1930 [id number]                 Emergency Contacts      (Rel.) Home Phone Work Phone Mobile Phone    Willie Moss (Son) -- -- [phone]    Steph Espino (Daughter) -- -- [phone]    Isadora Cooper (Daughter) -- -- [phone]            Emergency Contact Information     Name Relation Home Work Mobile    Willie Moss Son   [phone]    Steph Espino Daughter   [phone]    Isadora Cooper Daughter   [phone]          Insurance Information                MEDICARE/MEDICARE A & B Phone: [phone]    Subscriber: Mary Ann Cooper Subscriber#: [id number]    Group#: -- Precert#: --             History & Physical      Chayito Scott DO at 12/05/21 0551              [facility] Medicine Services  HISTORY AND " PHYSICAL    Patient Name: Mary Ann Cooper  : 3/31/1930  MRN: [id number]  Primary Care Physician: Christi Gold MD  Date of admission: 2021      Subjective   Subjective     Chief Complaint:  Acute hypoxia     HPI:  Mary Ann Cooper is a 89 y.o. female with PMHx of dementia, hypothyroidism, hx of breast cancer, atrial fib,   Pt is now at [facility]. Pt was brought to [facility] ED after she was noted to be hypoxic. According to her son and DIL who are at the bedside the pt got up out of the bed and fell and hit her head. Her pulse ox was in the low 80%. So EMS was called. DIL reports she has had a cough and increased congestion that they noted on Friday.  Yesterday she seemed some better and then seemed to become progressively worse. Family reports they had also noted pt was having increased work to breath. Family reports she is has had increased anxiety since recently moving in to [facility] and she has been more fearful and agitated since she is no longer living with her . Upon arrival to the ED pt was noted to have Right upper lobe and lingular pneumonia. She was also noted to be hypoxic and hypotensive. She was started on Cefepime and Vancomycin and given IV fluids. She responded transiently to fluid resuscitation and again became hypotensive. Pt family requested she be DNR/DNI. Pt will be admitted to Hospitalist Service for further evaluation and management.     COVID Details:    Symptoms:    [] NONE [] Fever [x]  Cough [x] Shortness of breath [] Change in taste/smell      The patient has started, but not completed, their COVID-19 vaccination series.  Pt had 1st and 2nd Covid vaccines.     Review of Systems   Constitutional: Positive for activity change, appetite change, chills and fatigue. Negative for fever.   HENT: Negative for congestion, ear pain, rhinorrhea and sore throat.    Respiratory: Positive for cough and shortness of breath. Negative for wheezing.    Cardiovascular:  Negative for chest pain, palpitations and leg swelling.   Gastrointestinal: Negative for abdominal pain, blood in stool, constipation, diarrhea, nausea and vomiting.   Genitourinary: Negative for dysuria and hematuria.   Musculoskeletal: Negative for back pain.   Skin: Negative.    Neurological: Positive for weakness. Negative for dizziness, light-headedness and headaches.   Psychiatric/Behavioral: Positive for agitation. Negative for dysphoric mood and sleep disturbance. The patient is nervous/anxious (more anxious since seperated from her  ).         All other systems reviewed and are negative.     Personal History     Past Medical History:   Diagnosis Date   • Breast cancer (HCC)    • Cancer (HCC)    • Compression fracture of vertebrae (HCC)    • Disease of thyroid gland    • Hypertension    • Thyroid cancer (HCC)        Past Surgical History:   Procedure Laterality Date   • BREAST SURGERY     • HYSTERECTOMY     • KYPHOPLASTY     • THYROID SURGERY         Family History:  family history includes Heart attack in her father. Otherwise pertinent FHx was reviewed and unremarkable.     Social History:  reports that she has never smoked. She has never used smokeless tobacco. She reports previous alcohol use. She reports that she does not use drugs.  Social History     Social History Narrative    Resides at [facility], [place]      is living     Son is Power of Willie.     2 other sons, and 4 step children        Medications:  Available home medication information reviewed.  No current facility-administered medications on file prior to encounter.     Current Outpatient Medications on File Prior to Encounter   Medication Sig Dispense Refill   • levothyroxine sodium (TIROSINT) 88 MCG capsule Take 88 mcg by mouth Daily.     • metoprolol tartrate 37.5 MG tablet Take 37.5 mg by mouth Every 12 (Twelve) Hours.     • mirtazapine (REMERON SOL-TAB) 15 MG disintegrating tablet Place 1 tablet on the tongue  Every Night.     • magnesium, as, gluconate (MAGONATE) 500 (27 Mg) MG tablet Take 1 tablet by mouth Daily.     • potassium chloride (MICRO-K) 10 MEQ CR capsule Take 2 capsules by mouth Daily.           Allergies   Allergen Reactions   • Codeine Unknown - High Severity   • Levothyroxine Unknown - Low Severity   • Penicillins Unknown - High Severity     ** tolerates ceftriaxone **   • Propoxyphene Nausea And Vomiting       Objective   Objective     Vital Signs:   Temp:  [97.6 °F (36.4 °C)] 97.6 °F (36.4 °C)  Heart Rate:  [] 80  Resp:  [26] 26  BP: ()/(39-96) 72/52  Flow (L/min):  [6] 6       Physical Exam  Vitals and nursing note reviewed.   Constitutional:       General: She is not in acute distress.     Appearance: She is cachectic. She is ill-appearing and toxic-appearing. She is not diaphoretic.   HENT:      Head: Atraumatic.      Right Ear: External ear normal.      Left Ear: External ear normal.   Eyes:      General:         Right eye: No discharge.         Left eye: No discharge.      Conjunctiva/sclera: Conjunctivae normal.   Neck:      Vascular: JVD present.   Cardiovascular:      Rate and Rhythm: Normal rate and regular rhythm.      Heart sounds: No murmur heard.      Pulmonary:      Effort: Pulmonary effort is normal.      Breath sounds: Wheezing and rales present.   Abdominal:      General: Bowel sounds are normal. There is no distension.      Palpations: Abdomen is soft.      Tenderness: There is no abdominal tenderness.   Musculoskeletal:      Cervical back: Normal range of motion and neck supple.      Right lower leg: No edema.      Left lower leg: No edema.   Skin:     Findings: No rash.   Neurological:      Mental Status: She is oriented to person, place, and time. She is lethargic.   Psychiatric:      Comments: Unresponsive             Result Review:  I have personally reviewed the results from the time of this admission to 12/5/2021 05:52 EST and agree with these findings:  [x]   Laboratory  [x]  Microbiology  [x]  Radiology  [x]  EKG/Telemetry   []  Cardiology/Vascular   []  Pathology  []  Old records  []  Other:  Most notable findings include:     LAB RESULTS:      Lab 12/05/21 0253   WBC 13.21*   HEMOGLOBIN 15.0   HEMATOCRIT 47.2*   PLATELETS 276   NEUTROS ABS 9.48*   IMMATURE GRANS (ABS) 0.26*   LYMPHS ABS 2.29   MONOS ABS 0.99*   EOS ABS 0.13   MCV 95.5   PROCALCITONIN 0.07   LACTATE 2.2*   D DIMER QUANT >20.00*         Lab 12/05/21  0253   SODIUM 139   POTASSIUM 4.4   CHLORIDE 102   CO2 26.0   ANION GAP 11.0   BUN 20   CREATININE 0.69   GLUCOSE 118*   CALCIUM 10.2*   MAGNESIUM 2.1   PHOSPHORUS 3.4   TSH 14.360*         Lab 12/05/21 0253   TOTAL PROTEIN 7.2   ALBUMIN 3.70   GLOBULIN 3.5   ALT (SGPT) 34*   AST (SGOT) 32   BILIRUBIN 0.5   ALK PHOS 205*         Lab 12/05/21  0253   PROBNP 853.2   TROPONIN T <0.010                 Lab 12/05/21  0449   PH, ARTERIAL 7.314*   PCO2, ARTERIAL 46.9*   PO2 .0*   FIO2 100   HCO3 ART 23.8   BASE EXCESS ART -2.7*   CARBOXYHEMOGLOBIN 0.5     UA    Urinalysis 10/15/21 10/15/21 12/5/21 12/5/21    1544 1544 0341 0341   Squamous Epithelial Cells, UA  0-2  7-12 (A)   Specific [place], UA 1.024  1.018    Ketones, UA Trace (A)  Negative    Blood, UA Trace (A)  Negative    Leukocytes, UA Trace (A)  Trace (A)    Nitrite, UA Positive (A)  Negative    RBC, UA  7-12 (A)  0-2   WBC, UA  6-12 (A)  6-12 (A)   Bacteria, UA  4+ (A)  3+ (A)   (A) Abnormal value              Microbiology Results (last 10 days)     Procedure Component Value - Date/Time    COVID PRE-OP / PRE-PROCEDURE SCREENING ORDER (NO ISOLATION) - Swab, Nasopharynx [[id number]]  (Normal) Collected: 12/05/21 0254    Lab Status: Final result Specimen: Swab from Nasopharynx Updated: 12/05/21 0351    Narrative:      The following orders were created for panel order COVID PRE-OP / PRE-PROCEDURE SCREENING ORDER (NO ISOLATION) - Swab, Nasopharynx.  Procedure                               Abnormality          Status                     ---------                               -----------         ------                     COVID-19, ABBOTT IN-HOUS...[[id number]]  Normal              Final result                 Please view results for these tests on the individual orders.    COVID-19, ABBOTT IN-HOUSE,NASAL Swab (NO TRANSPORT MEDIA) 2 HR TAT - Swab, Nasopharynx [[id number]]  (Normal) Collected: 12/05/21 0254    Lab Status: Final result Specimen: Swab from Nasopharynx Updated: 12/05/21 0351     COVID19 Presumptive Negative    Narrative:      Fact sheet for providers: https://www.fda.gov/media/817647/download     Fact sheet for patients: https://www.fda.gov/media/325466/download    Test performed by PCR.  If inconsistent with clinical signs and symptoms patient should be tested with different authorized molecular test.          CT Head Without Contrast    Result Date: 12/5/2021  CT Head WO HISTORY: Tension, possible fall with altered mental status TECHNIQUE: Axial unenhanced head CT. Radiation dose reduction techniques included automated exposure control or exposure modulation based on body size. Count of known CT and cardiac nuc med studies performed in previous 12 months: 3. Time of scan: 3:03 AM COMPARISON: 10/15/2021 FINDINGS: No intracranial hemorrhage, mass, or infarct. No hydrocephalus or extra-axial fluid collection. There are senescent changes, including volume loss and nonspecific white matter change, but no acute abnormality is seen. The skull base, calvarium, and extracranial soft tissues are normal.     Impression: Senescent changes without acute abnormality. Signer Name: Anthony Tse MD  Signed: 12/5/2021 3:13 AM  Workstation Name: [email or web address]    XR Chest 1 View    Result Date: 12/5/2021  CR Chest 1 Vw INDICATION: Difficulty breathing and confusion COMPARISON:  CT chest 10/15/2021 FINDINGS: Portable AP view(s) of the chest.  Has bilateral breast implants which cause the  appearance of increased density on each side. I believe there is also right upper lobe infiltrate. There may be some lingular infiltrate as well. Heart size normal. Previous vertebral plasty     Impression: There appears be increased density in the upper lobe on the right and in the lingula on the left but is difficult to tell how much of the density is due to the bilateral breast implants. There is a  film from a CT scan from 10/15/2021 when the lungs were clear for comparison. I suspect patient has at least right-sided pneumonia and possibly lingular pneumonia as well Signer Name: Anthony Tse MD  Signed: 12/5/2021 3:54 AM  Workstation Name: [facility]  [facility]          Assessment/Plan   Assessment & Plan     Active Hospital Problems    Diagnosis  POA   • Acute respiratory failure with hypoxia (HCC) [J96.01]  Yes     Priority: High   • Sepsis associated hypotension (HCC) [A41.9, I95.9]  Unknown     Priority: High   • Sepsis due to pneumonia (HCC) [J18.9, A41.9]  Unknown     Priority: High   • Essential hypertension [I10]  Yes   • Dementia (HCC) [F03.90]  Yes   • Hypothyroidism [E03.9]  Yes     PLAN:  --Admit pt to hospitalist service. Had long discussion with pt son Willie and daughter-in-law. They do want pt to be DNR/DNI. But agree to IV antibiotics. Discussed the complexity of CHF and sepsis secondary to pneumonia. We discussed treating Pneumonia with cefepime and vancomycin. And family agrees to hospice consult. However for now with will start fixed dose of dopamine until pt Son can discuss with her .   --Pt with worsening dementia-agitated on arrival and required IV ativan in the ED. Now sedated.   --TSH elevated. We will give IV levothyroxine until the pt is more alert.   --Decreased EF on bedside echo completed in the ED-noted to be 15%.   --consult Hospice.   --due to hypoxic respiratory failure with start trial of bipap.     DVT prophylaxis: heparin       CODE STATUS:     Code Status and Medical Interventions:   Ordered at: 12/05/21 0555     Medical Intervention Limits:    NO intubation (DNI)     Level Of Support Discussed With:    Next of Kin (If No Surrogate)     Code Status (Patient has no pulse and is not breathing):    No CPR (Do Not Attempt to Resuscitate)     Medical Interventions (Patient has pulse or is breathing):    Limited Support       Admission Status:  I believe this patient meets INPATIENT status due to acute respiratory failure secondary to pneumonia and sepsis.  I feel patient’s risk for adverse outcomes and need for care warrant INPATIENT evaluation and I predict the patient’s care encounter to likely last beyond 2 midnights.      Chayito Scott DO  12/05/21    Electronically signed by Chayito Scott DO at 12/05/21 0771

## 2021-12-05 NOTE — H&P (VIEW-ONLY)
[facility] Medicine Services  HISTORY AND PHYSICAL    Patient Name: Mary Ann Cooper  : 3/31/1930  MRN: [id number]  Primary Care Physician: Christi Gold MD  Date of admission: 2021      Subjective   Subjective     Chief Complaint:  Acute hypoxia     HPI:  Mary Ann Cooper is a 89 y.o. female with PMHx of dementia, hypothyroidism, hx of breast cancer, atrial fib,   Pt is now at [facility]. Pt was brought to [facility] ED after she was noted to be hypoxic. According to her son and DIL who are at the bedside the pt got up out of the bed and fell and hit her head. Her pulse ox was in the low 80%. So EMS was called. DIL reports she has had a cough and increased congestion that they noted on Friday.  Yesterday she seemed some better and then seemed to become progressively worse. Family reports they had also noted pt was having increased work to breath. Family reports she is has had increased anxiety since recently moving in to [facility] and she has been more fearful and agitated since she is no longer living with her . Upon arrival to the ED pt was noted to have Right upper lobe and lingular pneumonia. She was also noted to be hypoxic and hypotensive. She was started on Cefepime and Vancomycin and given IV fluids. She responded transiently to fluid resuscitation and again became hypotensive. Pt family requested she be DNR/DNI. Pt will be admitted to Hospitalist Service for further evaluation and management.     COVID Details:    Symptoms:    [] NONE [] Fever [x]  Cough [x] Shortness of breath [] Change in taste/smell      The patient has started, but not completed, their COVID-19 vaccination series.  Pt had 1st and 2nd Covid vaccines.     Review of Systems   Constitutional: Positive for activity change, appetite change, chills and fatigue. Negative for fever.   HENT: Negative for congestion, ear pain, rhinorrhea and sore throat.    Respiratory: Positive for cough  and shortness of breath. Negative for wheezing.    Cardiovascular: Negative for chest pain, palpitations and leg swelling.   Gastrointestinal: Negative for abdominal pain, blood in stool, constipation, diarrhea, nausea and vomiting.   Genitourinary: Negative for dysuria and hematuria.   Musculoskeletal: Negative for back pain.   Skin: Negative.    Neurological: Positive for weakness. Negative for dizziness, light-headedness and headaches.   Psychiatric/Behavioral: Positive for agitation. Negative for dysphoric mood and sleep disturbance. The patient is nervous/anxious (more anxious since seperated from her  ).         All other systems reviewed and are negative.     Personal History     Past Medical History:   Diagnosis Date   • Breast cancer (HCC)    • Cancer (HCC)    • Compression fracture of vertebrae (HCC)    • Disease of thyroid gland    • Hypertension    • Thyroid cancer (HCC)        Past Surgical History:   Procedure Laterality Date   • BREAST SURGERY     • HYSTERECTOMY     • KYPHOPLASTY     • THYROID SURGERY         Family History:  family history includes Heart attack in her father. Otherwise pertinent FHx was reviewed and unremarkable.     Social History:  reports that she has never smoked. She has never used smokeless tobacco. She reports previous alcohol use. She reports that she does not use drugs.  Social History     Social History Narrative    Resides at [facility]      is living     Son is Power of Willie.     2 other sons, and 4 step children        Medications:  Available home medication information reviewed.  No current facility-administered medications on file prior to encounter.     Current Outpatient Medications on File Prior to Encounter   Medication Sig Dispense Refill   • levothyroxine sodium (TIROSINT) 88 MCG capsule Take 88 mcg by mouth Daily.     • metoprolol tartrate 37.5 MG tablet Take 37.5 mg by mouth Every 12 (Twelve) Hours.     • mirtazapine  (REMERON SOL-TAB) 15 MG disintegrating tablet Place 1 tablet on the tongue Every Night.     • magnesium, as, gluconate (MAGONATE) 500 (27 Mg) MG tablet Take 1 tablet by mouth Daily.     • potassium chloride (MICRO-K) 10 MEQ CR capsule Take 2 capsules by mouth Daily.           Allergies   Allergen Reactions   • Codeine Unknown - High Severity   • Levothyroxine Unknown - Low Severity   • Penicillins Unknown - High Severity     ** tolerates ceftriaxone **   • Propoxyphene Nausea And Vomiting       Objective   Objective     Vital Signs:   Temp:  [97.6 °F (36.4 °C)] 97.6 °F (36.4 °C)  Heart Rate:  [] 80  Resp:  [26] 26  BP: ()/(39-96) 72/52  Flow (L/min):  [6] 6       Physical Exam  Vitals and nursing note reviewed.   Constitutional:       General: She is not in acute distress.     Appearance: She is cachectic. She is ill-appearing and toxic-appearing. She is not diaphoretic.   HENT:      Head: Atraumatic.      Right Ear: External ear normal.      Left Ear: External ear normal.   Eyes:      General:         Right eye: No discharge.         Left eye: No discharge.      Conjunctiva/sclera: Conjunctivae normal.   Neck:      Vascular: JVD present.   Cardiovascular:      Rate and Rhythm: Normal rate and regular rhythm.      Heart sounds: No murmur heard.      Pulmonary:      Effort: Pulmonary effort is normal.      Breath sounds: Wheezing and rales present.   Abdominal:      General: Bowel sounds are normal. There is no distension.      Palpations: Abdomen is soft.      Tenderness: There is no abdominal tenderness.   Musculoskeletal:      Cervical back: Normal range of motion and neck supple.      Right lower leg: No edema.      Left lower leg: No edema.   Skin:     Findings: No rash.   Neurological:      Mental Status: She is oriented to person, place, and time. She is lethargic.   Psychiatric:      Comments: Unresponsive             Result Review:  I have personally reviewed the results from the time of this  admission to 12/5/2021 05:52 EST and agree with these findings:  [x]  Laboratory  [x]  Microbiology  [x]  Radiology  [x]  EKG/Telemetry   []  Cardiology/Vascular   []  Pathology  []  Old records  []  Other:  Most notable findings include:     LAB RESULTS:      Lab 12/05/21 0253   WBC 13.21*   HEMOGLOBIN 15.0   HEMATOCRIT 47.2*   PLATELETS 276   NEUTROS ABS 9.48*   IMMATURE GRANS (ABS) 0.26*   LYMPHS ABS 2.29   MONOS ABS 0.99*   EOS ABS 0.13   MCV 95.5   PROCALCITONIN 0.07   LACTATE 2.2*   D DIMER QUANT >20.00*         Lab 12/05/21 0253   SODIUM 139   POTASSIUM 4.4   CHLORIDE 102   CO2 26.0   ANION GAP 11.0   BUN 20   CREATININE 0.69   GLUCOSE 118*   CALCIUM 10.2*   MAGNESIUM 2.1   PHOSPHORUS 3.4   TSH 14.360*         Lab 12/05/21  0253   TOTAL PROTEIN 7.2   ALBUMIN 3.70   GLOBULIN 3.5   ALT (SGPT) 34*   AST (SGOT) 32   BILIRUBIN 0.5   ALK PHOS 205*         Lab 12/05/21  0253   PROBNP 853.2   TROPONIN T <0.010                 Lab 12/05/21  0449   PH, ARTERIAL 7.314*   PCO2, ARTERIAL 46.9*   PO2 .0*   FIO2 100   HCO3 ART 23.8   BASE EXCESS ART -2.7*   CARBOXYHEMOGLOBIN 0.5     UA    Urinalysis 10/15/21 10/15/21 12/5/21 12/5/21    1544 1544 0341 0341   Squamous Epithelial Cells, UA  0-2  7-12 (A)   Specific [place], UA 1.024  1.018    Ketones, UA Trace (A)  Negative    Blood, UA Trace (A)  Negative    Leukocytes, UA Trace (A)  Trace (A)    Nitrite, UA Positive (A)  Negative    RBC, UA  7-12 (A)  0-2   WBC, UA  6-12 (A)  6-12 (A)   Bacteria, UA  4+ (A)  3+ (A)   (A) Abnormal value              Microbiology Results (last 10 days)     Procedure Component Value - Date/Time    COVID PRE-OP / PRE-PROCEDURE SCREENING ORDER (NO ISOLATION) - Swab, Nasopharynx [[id number]]  (Normal) Collected: 12/05/21 0254    Lab Status: Final result Specimen: Swab from Nasopharynx Updated: 12/05/21 0351    Narrative:      The following orders were created for panel order COVID PRE-OP / PRE-PROCEDURE SCREENING ORDER (NO ISOLATION) -  Swab, Nasopharynx.  Procedure                               Abnormality         Status                     ---------                               -----------         ------                     COVID-19, ABBOTT IN-HOUS...[[id number]]  Normal              Final result                 Please view results for these tests on the individual orders.    COVID-19, ABBOTT IN-HOUSE,NASAL Swab (NO TRANSPORT MEDIA) 2 HR TAT - Swab, Nasopharynx [[id number]]  (Normal) Collected: 12/05/21 0254    Lab Status: Final result Specimen: Swab from Nasopharynx Updated: 12/05/21 0351     COVID19 Presumptive Negative    Narrative:      Fact sheet for providers: https://www.fda.gov/media/540795/download     Fact sheet for patients: https://www.fda.gov/media/972361/download    Test performed by PCR.  If inconsistent with clinical signs and symptoms patient should be tested with different authorized molecular test.          CT Head Without Contrast    Result Date: 12/5/2021  CT Head WO HISTORY: Tension, possible fall with altered mental status TECHNIQUE: Axial unenhanced head CT. Radiation dose reduction techniques included automated exposure control or exposure modulation based on body size. Count of known CT and cardiac nuc med studies performed in previous 12 months: 3. Time of scan: 3:03 AM COMPARISON: 10/15/2021 FINDINGS: No intracranial hemorrhage, mass, or infarct. No hydrocephalus or extra-axial fluid collection. There are senescent changes, including volume loss and nonspecific white matter change, but no acute abnormality is seen. The skull base, calvarium, and extracranial soft tissues are normal.     Impression: Senescent changes without acute abnormality. Signer Name: Anthony Tse MD  Signed: 12/5/2021 3:13 AM  Workstation Name: [email or web address]    XR Chest 1 View    Result Date: 12/5/2021  CR Chest 1 Vw INDICATION: Difficulty breathing and confusion COMPARISON:  CT chest 10/15/2021 FINDINGS: Portable  AP view(s) of the chest.  Has bilateral breast implants which cause the appearance of increased density on each side. I believe there is also right upper lobe infiltrate. There may be some lingular infiltrate as well. Heart size normal. Previous vertebral plasty     Impression: There appears be increased density in the upper lobe on the right and in the lingula on the left but is difficult to tell how much of the density is due to the bilateral breast implants. There is a  film from a CT scan from 10/15/2021 when the lungs were clear for comparison. I suspect patient has at least right-sided pneumonia and possibly lingular pneumonia as well Signer Name: Anthony Tse MD  Signed: 12/5/2021 3:54 AM  Workstation Name: [email or web address]  [facility]          Assessment/Plan   Assessment & Plan     Active Hospital Problems    Diagnosis  POA   • Acute respiratory failure with hypoxia (HCC) [J96.01]  Yes     Priority: High   • Sepsis associated hypotension (HCC) [A41.9, I95.9]  Unknown     Priority: High   • Sepsis due to pneumonia (HCC) [J18.9, A41.9]  Unknown     Priority: High   • Essential hypertension [I10]  Yes   • Dementia (HCC) [F03.90]  Yes   • Hypothyroidism [E03.9]  Yes     PLAN:  --Admit pt to hospitalist service. Had long discussion with pt son Willie and daughter-in-law. They do want pt to be DNR/DNI. But agree to IV antibiotics. Discussed the complexity of CHF and sepsis secondary to pneumonia. We discussed treating Pneumonia with cefepime and vancomycin. And family agrees to hospice consult. However for now with will start fixed dose of dopamine until pt Son can discuss with her .   --Pt with worsening dementia-agitated on arrival and required IV ativan in the ED. Now sedated.   --TSH elevated. We will give IV levothyroxine until the pt is more alert.   --Decreased EF on bedside echo completed in the ED-noted to be 15%.   --consult Hospice.   --due to hypoxic respiratory failure  with start trial of bipap.     DVT prophylaxis: heparin       CODE STATUS:    Code Status and Medical Interventions:   Ordered at: 12/05/21 0555     Medical Intervention Limits:    NO intubation (DNI)     Level Of Support Discussed With:    Next of Kin (If No Surrogate)     Code Status (Patient has no pulse and is not breathing):    No CPR (Do Not Attempt to Resuscitate)     Medical Interventions (Patient has pulse or is breathing):    Limited Support       Admission Status:  I believe this patient meets INPATIENT status due to acute respiratory failure secondary to pneumonia and sepsis.  I feel patient’s risk for adverse outcomes and need for care warrant INPATIENT evaluation and I predict the patient’s care encounter to likely last beyond 2 midnights.      Cahyito Scott, DO  12/05/21

## 2021-12-05 NOTE — DISCHARGE PLACEMENT REQUEST
"Mary Ann Cooper (89 y.o. Female)             Date of Birth Social Security Number Address Home Phone MRN    03/31/1930  [address] [place] [id number] [phone] [id number]    Anabaptist Marital Status             Nondenominational        Admission Date Admission Type Admitting Provider Attending Provider Department, Room/Bed    12/5/21 Emergency Rebeca Rankin DO Roesch, Lyndsey, DO [facility] 5B, N549/1    Discharge Date Discharge Disposition Discharge Destination                         Attending Provider: Rebeca Rankin DO    Allergies: Codeine, Penicillins, Propoxyphene    Isolation: None   Infection: None   Code Status: No CPR   Advance Care Planning Activity    Ht: 160 cm (63\")   Wt: 41.4 kg (91 lb 3.2 oz)    Admission Cmt: None   Principal Problem: None                Active Insurance as of 12/5/2021     Primary Coverage     Payor Plan Insurance Group Employer/Plan Group    MEDICARE MEDICARE A & B      Payor Plan Address Payor Plan Phone Number Payor Plan Fax Number Effective Dates    [address] [phone]  3/1/1995 - None Entered    [place] [address]       Subscriber Name Subscriber Birth Date Member ID       MARY ANN COOPER 3/31/1930 [id number]                 Emergency Contacts      (Rel.) Home Phone Work Phone Mobile Phone    Willie Moss (Son) -- -- [phone]    Steph Espino (Daughter) -- -- [phone]    Isadora Cooper (Daughter) -- -- [phone]            Emergency Contact Information     Name Relation Home Work Mobile    Willie Moss Son   [phone]    Steph Espino Daughter   [phone]    Isadora Cooper Daughter   [phone]          Insurance Information                MEDICARE/MEDICARE A & B Phone: [phone]    Subscriber: Mary Ann Cooper Subscriber#: [id number]    Group#: -- Precert#: --             History & Physical      Chayito Scott DO at 12/05/21 0551              [facility] Medicine Services  HISTORY AND " PHYSICAL    Patient Name: Mary Ann Cooper  : 3/31/1930  MRN: [id number]  Primary Care Physician: Christi Gold MD  Date of admission: 2021      Subjective   Subjective     Chief Complaint:  Acute hypoxia     HPI:  Mary Ann Cooper is a 89 y.o. female with PMHx of dementia, hypothyroidism, hx of breast cancer, atrial fib,   Pt is now at [facility]. Pt was brought to [facility] ED after she was noted to be hypoxic. According to her son and DIL who are at the bedside the pt got up out of the bed and fell and hit her head. Her pulse ox was in the low 80%. So EMS was called. DIL reports she has had a cough and increased congestion that they noted on Friday.  Yesterday she seemed some better and then seemed to become progressively worse. Family reports they had also noted pt was having increased work to breath. Family reports she is has had increased anxiety since recently moving in to [facility] and she has been more fearful and agitated since she is no longer living with her . Upon arrival to the ED pt was noted to have Right upper lobe and lingular pneumonia. She was also noted to be hypoxic and hypotensive. She was started on Cefepime and Vancomycin and given IV fluids. She responded transiently to fluid resuscitation and again became hypotensive. Pt family requested she be DNR/DNI. Pt will be admitted to Hospitalist Service for further evaluation and management.     COVID Details:    Symptoms:    [] NONE [] Fever [x]  Cough [x] Shortness of breath [] Change in taste/smell      The patient has started, but not completed, their COVID-19 vaccination series.  Pt had 1st and 2nd Covid vaccines.     Review of Systems   Constitutional: Positive for activity change, appetite change, chills and fatigue. Negative for fever.   HENT: Negative for congestion, ear pain, rhinorrhea and sore throat.    Respiratory: Positive for cough and shortness of breath. Negative for wheezing.    Cardiovascular:  Negative for chest pain, palpitations and leg swelling.   Gastrointestinal: Negative for abdominal pain, blood in stool, constipation, diarrhea, nausea and vomiting.   Genitourinary: Negative for dysuria and hematuria.   Musculoskeletal: Negative for back pain.   Skin: Negative.    Neurological: Positive for weakness. Negative for dizziness, light-headedness and headaches.   Psychiatric/Behavioral: Positive for agitation. Negative for dysphoric mood and sleep disturbance. The patient is nervous/anxious (more anxious since seperated from her  ).         All other systems reviewed and are negative.     Personal History     Past Medical History:   Diagnosis Date   • Breast cancer (HCC)    • Cancer (HCC)    • Compression fracture of vertebrae (HCC)    • Disease of thyroid gland    • Hypertension    • Thyroid cancer (HCC)        Past Surgical History:   Procedure Laterality Date   • BREAST SURGERY     • HYSTERECTOMY     • KYPHOPLASTY     • THYROID SURGERY         Family History:  family history includes Heart attack in her father. Otherwise pertinent FHx was reviewed and unremarkable.     Social History:  reports that she has never smoked. She has never used smokeless tobacco. She reports previous alcohol use. She reports that she does not use drugs.  Social History     Social History Narrative    Resides at [facility]      is living     Son is Power of Willie.     2 other sons, and 4 step children        Medications:  Available home medication information reviewed.  No current facility-administered medications on file prior to encounter.     Current Outpatient Medications on File Prior to Encounter   Medication Sig Dispense Refill   • levothyroxine sodium (TIROSINT) 88 MCG capsule Take 88 mcg by mouth Daily.     • metoprolol tartrate 37.5 MG tablet Take 37.5 mg by mouth Every 12 (Twelve) Hours.     • mirtazapine (REMERON SOL-TAB) 15 MG disintegrating tablet Place 1 tablet on the tongue  Every Night.     • magnesium, as, gluconate (MAGONATE) 500 (27 Mg) MG tablet Take 1 tablet by mouth Daily.     • potassium chloride (MICRO-K) 10 MEQ CR capsule Take 2 capsules by mouth Daily.           Allergies   Allergen Reactions   • Codeine Unknown - High Severity   • Levothyroxine Unknown - Low Severity   • Penicillins Unknown - High Severity     ** tolerates ceftriaxone **   • Propoxyphene Nausea And Vomiting       Objective   Objective     Vital Signs:   Temp:  [97.6 °F (36.4 °C)] 97.6 °F (36.4 °C)  Heart Rate:  [] 80  Resp:  [26] 26  BP: ()/(39-96) 72/52  Flow (L/min):  [6] 6       Physical Exam  Vitals and nursing note reviewed.   Constitutional:       General: She is not in acute distress.     Appearance: She is cachectic. She is ill-appearing and toxic-appearing. She is not diaphoretic.   HENT:      Head: Atraumatic.      Right Ear: External ear normal.      Left Ear: External ear normal.   Eyes:      General:         Right eye: No discharge.         Left eye: No discharge.      Conjunctiva/sclera: Conjunctivae normal.   Neck:      Vascular: JVD present.   Cardiovascular:      Rate and Rhythm: Normal rate and regular rhythm.      Heart sounds: No murmur heard.      Pulmonary:      Effort: Pulmonary effort is normal.      Breath sounds: Wheezing and rales present.   Abdominal:      General: Bowel sounds are normal. There is no distension.      Palpations: Abdomen is soft.      Tenderness: There is no abdominal tenderness.   Musculoskeletal:      Cervical back: Normal range of motion and neck supple.      Right lower leg: No edema.      Left lower leg: No edema.   Skin:     Findings: No rash.   Neurological:      Mental Status: She is oriented to person, place, and time. She is lethargic.   Psychiatric:      Comments: Unresponsive             Result Review:  I have personally reviewed the results from the time of this admission to 12/5/2021 05:52 EST and agree with these findings:  [x]   Laboratory  [x]  Microbiology  [x]  Radiology  [x]  EKG/Telemetry   []  Cardiology/Vascular   []  Pathology  []  Old records  []  Other:  Most notable findings include:     LAB RESULTS:      Lab 12/05/21 0253   WBC 13.21*   HEMOGLOBIN 15.0   HEMATOCRIT 47.2*   PLATELETS 276   NEUTROS ABS 9.48*   IMMATURE GRANS (ABS) 0.26*   LYMPHS ABS 2.29   MONOS ABS 0.99*   EOS ABS 0.13   MCV 95.5   PROCALCITONIN 0.07   LACTATE 2.2*   D DIMER QUANT >20.00*         Lab 12/05/21  0253   SODIUM 139   POTASSIUM 4.4   CHLORIDE 102   CO2 26.0   ANION GAP 11.0   BUN 20   CREATININE 0.69   GLUCOSE 118*   CALCIUM 10.2*   MAGNESIUM 2.1   PHOSPHORUS 3.4   TSH 14.360*         Lab 12/05/21 0253   TOTAL PROTEIN 7.2   ALBUMIN 3.70   GLOBULIN 3.5   ALT (SGPT) 34*   AST (SGOT) 32   BILIRUBIN 0.5   ALK PHOS 205*         Lab 12/05/21  0253   PROBNP 853.2   TROPONIN T <0.010                 Lab 12/05/21  0449   PH, ARTERIAL 7.314*   PCO2, ARTERIAL 46.9*   PO2 .0*   FIO2 100   HCO3 ART 23.8   BASE EXCESS ART -2.7*   CARBOXYHEMOGLOBIN 0.5     UA    Urinalysis 10/15/21 10/15/21 12/5/21 12/5/21    1544 1544 0341 0341   Squamous Epithelial Cells, UA  0-2  7-12 (A)   Specific [place], UA 1.024  1.018    Ketones, UA Trace (A)  Negative    Blood, UA Trace (A)  Negative    Leukocytes, UA Trace (A)  Trace (A)    Nitrite, UA Positive (A)  Negative    RBC, UA  7-12 (A)  0-2   WBC, UA  6-12 (A)  6-12 (A)   Bacteria, UA  4+ (A)  3+ (A)   (A) Abnormal value              Microbiology Results (last 10 days)     Procedure Component Value - Date/Time    COVID PRE-OP / PRE-PROCEDURE SCREENING ORDER (NO ISOLATION) - Swab, Nasopharynx [[id number]]  (Normal) Collected: 12/05/21 0254    Lab Status: Final result Specimen: Swab from Nasopharynx Updated: 12/05/21 0351    Narrative:      The following orders were created for panel order COVID PRE-OP / PRE-PROCEDURE SCREENING ORDER (NO ISOLATION) - Swab, Nasopharynx.  Procedure                               Abnormality          Status                     ---------                               -----------         ------                     COVID-19, ABBOTT IN-HOUS...[[id number]]  Normal              Final result                 Please view results for these tests on the individual orders.    COVID-19, ABBOTT IN-HOUSE,NASAL Swab (NO TRANSPORT MEDIA) 2 HR TAT - Swab, Nasopharynx [[id number]]  (Normal) Collected: 12/05/21 0254    Lab Status: Final result Specimen: Swab from Nasopharynx Updated: 12/05/21 0351     COVID19 Presumptive Negative    Narrative:      Fact sheet for providers: https://www.fda.gov/media/407559/download     Fact sheet for patients: https://www.fda.gov/media/955214/download    Test performed by PCR.  If inconsistent with clinical signs and symptoms patient should be tested with different authorized molecular test.          CT Head Without Contrast    Result Date: 12/5/2021  CT Head WO HISTORY: Tension, possible fall with altered mental status TECHNIQUE: Axial unenhanced head CT. Radiation dose reduction techniques included automated exposure control or exposure modulation based on body size. Count of known CT and cardiac nuc med studies performed in previous 12 months: 3. Time of scan: 3:03 AM COMPARISON: 10/15/2021 FINDINGS: No intracranial hemorrhage, mass, or infarct. No hydrocephalus or extra-axial fluid collection. There are senescent changes, including volume loss and nonspecific white matter change, but no acute abnormality is seen. The skull base, calvarium, and extracranial soft tissues are normal.     Impression: Senescent changes without acute abnormality. Signer Name: Anthony Tse MD  Signed: 12/5/2021 3:13 AM  Workstation Name: [email or web address]    XR Chest 1 View    Result Date: 12/5/2021  CR Chest 1 Vw INDICATION: Difficulty breathing and confusion COMPARISON:  CT chest 10/15/2021 FINDINGS: Portable AP view(s) of the chest.  Has bilateral breast implants which cause the  appearance of increased density on each side. I believe there is also right upper lobe infiltrate. There may be some lingular infiltrate as well. Heart size normal. Previous vertebral plasty     Impression: There appears be increased density in the upper lobe on the right and in the lingula on the left but is difficult to tell how much of the density is due to the bilateral breast implants. There is a  film from a CT scan from 10/15/2021 when the lungs were clear for comparison. I suspect patient has at least right-sided pneumonia and possibly lingular pneumonia as well Signer Name: Anthony Tse MD  Signed: 12/5/2021 3:54 AM  Workstation Name: [facility]  [facility]          Assessment/Plan   Assessment & Plan     Active Hospital Problems    Diagnosis  POA   • Acute respiratory failure with hypoxia (HCC) [J96.01]  Yes     Priority: High   • Sepsis associated hypotension (HCC) [A41.9, I95.9]  Unknown     Priority: High   • Sepsis due to pneumonia (HCC) [J18.9, A41.9]  Unknown     Priority: High   • Essential hypertension [I10]  Yes   • Dementia (HCC) [F03.90]  Yes   • Hypothyroidism [E03.9]  Yes     PLAN:  --Admit pt to hospitalist service. Had long discussion with pt son Willie and daughter-in-law. They do want pt to be DNR/DNI. But agree to IV antibiotics. Discussed the complexity of CHF and sepsis secondary to pneumonia. We discussed treating Pneumonia with cefepime and vancomycin. And family agrees to hospice consult. However for now with will start fixed dose of dopamine until pt Son can discuss with her .   --Pt with worsening dementia-agitated on arrival and required IV ativan in the ED. Now sedated.   --TSH elevated. We will give IV levothyroxine until the pt is more alert.   --Decreased EF on bedside echo completed in the ED-noted to be 15%.   --consult Hospice.   --due to hypoxic respiratory failure with start trial of bipap.     DVT prophylaxis: heparin       CODE STATUS:     Code Status and Medical Interventions:   Ordered at: 12/05/21 0555     Medical Intervention Limits:    NO intubation (DNI)     Level Of Support Discussed With:    Next of Kin (If No Surrogate)     Code Status (Patient has no pulse and is not breathing):    No CPR (Do Not Attempt to Resuscitate)     Medical Interventions (Patient has pulse or is breathing):    Limited Support       Admission Status:  I believe this patient meets INPATIENT status due to acute respiratory failure secondary to pneumonia and sepsis.  I feel patient’s risk for adverse outcomes and need for care warrant INPATIENT evaluation and I predict the patient’s care encounter to likely last beyond 2 midnights.      Chayito Scott DO  12/05/21    Electronically signed by Chayito Scott DO at 12/05/21 0734

## 2021-12-05 NOTE — PROGRESS NOTES
"Pharmacy Consult-Vancomycin Dosing  Mary Ann Cooper is a  89 y.o. female receiving vancomycin therapy.     Indication: Pneumonia  Consulting Provider: Nico Cabral PA-C  ID Consult:     Goal AUC: 400 - 600 mg/L*hr    Current Antimicrobial Therapy  Anti-Infectives (From admission, onward)      Ordered     Dose/Rate Route Frequency Start Stop    12/05/21 0613  vancomycin (dosing per levels)        Ordering Provider: Lei Oviedo [facility]     Does not apply Daily 12/05/21 0900 12/12/21 0859    12/05/21 0559  Pharmacy to dose vancomycin        Ordering Provider: Nico Cabral PA-C     Does not apply Continuous PRN 12/05/21 0559 12/12/21 0558    12/05/21 0404  vancomycin in dextrose 5% 150 mL (VANCOCIN) IVPB 750 mg        Ordering Provider: Naeem Sim MD    20 mg/kg × 41.4 kg  over 60 Minutes Intravenous Once 12/05/21 0406 12/05/21 0602    12/05/21 0404  cefepime (MAXIPIME) 2 g/100 mL 0.9% NS (mbp)        Ordering Provider: Naeem Sim MD    2 g  200 mL/hr over 30 Minutes Intravenous Once 12/05/21 0406 12/05/21 0533            Allergies  Allergies as of 12/05/2021 - Reviewed 12/05/2021   Allergen Reaction Noted    Codeine Unknown - High Severity 10/15/2021    Levothyroxine Unknown - Low Severity 10/15/2021    Penicillins Unknown - High Severity 10/15/2021    Propoxyphene Nausea And Vomiting 06/08/2010       Labs    Results from last 7 days   Lab Units 12/05/21  0253   BUN mg/dL 20   CREATININE mg/dL 0.69       Results from last 7 days   Lab Units 12/05/21  0253   WBC 10*3/mm3 13.21*       Evaluation of Dosing     Last Dose Received in the ED/Outside Facility: Vancomycin 750 mg IV x 1 (12/5/21 05:02)  Is Patient on Dialysis or Renal Replacement:     Ht - 160 cm (63\")  Wt - 41.4 kg (91 lb 3.2 oz)    Estimated Creatinine Clearance: 29.9 mL/min (by C-G formula based on SCr of 0.69 mg/dL).    Intake & Output (last 3 days)       None            Microbiology and Radiology  Microbiology Results (last 10 " days)       Procedure Component Value - Date/Time    COVID PRE-OP / PRE-PROCEDURE SCREENING ORDER (NO ISOLATION) - Swab, Nasopharynx [[id number]]  (Normal) Collected: 12/05/21 0254    Lab Status: Final result Specimen: Swab from Nasopharynx Updated: 12/05/21 0351    Narrative:      The following orders were created for panel order COVID PRE-OP / PRE-PROCEDURE SCREENING ORDER (NO ISOLATION) - Swab, Nasopharynx.  Procedure                               Abnormality         Status                     ---------                               -----------         ------                     COVID-19, ABBOTT IN-HOUS...[[id number]]  Normal              Final result                 Please view results for these tests on the individual orders.    COVID-19, ABBOTT IN-HOUSE,NASAL Swab (NO TRANSPORT MEDIA) 2 HR TAT - Swab, Nasopharynx [[id number]]  (Normal) Collected: 12/05/21 0254    Lab Status: Final result Specimen: Swab from Nasopharynx Updated: 12/05/21 0351     COVID19 Presumptive Negative    Narrative:      Fact sheet for providers: https://www.fda.gov/media/960793/download     Fact sheet for patients: https://www.fda.gov/media/207730/download    Test performed by PCR.  If inconsistent with clinical signs and symptoms patient should be tested with different authorized molecular test.            Reported Vancomycin Levels                         Ins[employer] AUC Calculation:    Current AUC:  0 mg/L*hr    Predicted Steady State AUC on Current Dose:  mg/L*hr  _________________________________    Predicted Steady State AUC on New Dose:   mg/L*hr    Assessment/Plan:   Patient is 89 year old female; 41 kg, BMI 16, new orders for Norepinephrine   Vancomycin 750 mg IV x 1, then dosing per levels for now  Random Vancomycin level with AM Labs on Monday 12/6/21  MRSA Screen PCR ordered  BMP x 3 days  Pharmacy to follow    Lei Oviedo [facility]  12/5/21

## 2021-12-05 NOTE — ED PROVIDER NOTES
EMERGENCY DEPARTMENT ENCOUNTER    Pt Name: Mary Ann Cooper  MRN: [id number]  Pt :   3/31/1930  Room Number:    Date of encounter:  2021  PCP: Christi Gold MD  ED Provider: Naeem Sim MD    Historian: EMS, patient, later the patient's son      HPI:  Chief Complaint: Shortness of breath        Context: Mary Ann Cooper is a 89-year-old woman with advanced dementia who is DNR who presents from [facility] because of shortness of breath and new oxygen requirement. EMS reported that she was at neurologic baseline upon arrival. Upon arrival here she is confused does not know where she is or what month it is. She denies pain and says she does not know why she is here. She is currently requiring oxygen by 2 L nasal cannula which is not baseline for her. Heart and lungs clear to auscultation. She is frail, cachectic, malnourished. There is possible reported fall within the last few days at the nursing home but patient denies pain and EMS says they were called for shortness of breath. Patient cannot elaborate on this. No other history was given at this time.    PAST MEDICAL HISTORY  Past Medical History:   Diagnosis Date   • Breast cancer (HCC)    • Cancer (HCC)    • Disease of thyroid gland    • Hypertension    • Thyroid cancer (HCC)          PAST SURGICAL HISTORY  Past Surgical History:   Procedure Laterality Date   • BREAST SURGERY     • HYSTERECTOMY     • THYROID SURGERY           FAMILY HISTORY  Family History   Problem Relation Age of Onset   • Heart attack Father          SOCIAL HISTORY  Social History     Socioeconomic History   • Marital status:    Tobacco Use   • Smoking status: Never Smoker   • Smokeless tobacco: Never Used   Substance and Sexual Activity   • Alcohol use: Not Currently     Comment: previously wine drinker   • Drug use: Never   • Sexual activity: Defer         ALLERGIES  Codeine, Levothyroxine, Penicillins, and Propoxyphene        REVIEW OF SYSTEMS  Review of  Systems     Complete review of systems unobtainable due to altered mental status      PHYSICAL EXAM    I have reviewed the triage vital signs and nursing notes.    ED Triage Vitals [12/05/21 0213]   Temp Heart Rate Resp BP SpO2   97.6 °F (36.4 °C) 110 26 125/96 (S) (!) 82 %      Temp src Heart Rate Source Patient Position BP Location FiO2 (%)   Axillary Monitor Lying Left arm --       Physical Exam  GENERAL:   Appears ill, cachectic, elderly  HENT: Nares patent.  Dry mucous membranes  EYES: No scleral icterus.  Pupils equal and reactive  CV: Irregular, mild tachycardia, no appreciated murmurs rubs or gallops  RESPIRATORY: Wet rhonchi without appreciated focal consolidation or rails.  No wheezing  ABDOMEN: Soft, nontender  MUSCULOSKELETAL: No deformities.   NEURO: Demented, confused, scared speaking in single words not following commands  SKIN: Warm, dry, no rash visualized.        LAB RESULTS  Recent Results (from the past 24 hour(s))   Comprehensive Metabolic Panel    Collection Time: 12/05/21  2:53 AM    Specimen: Blood   Result Value Ref Range    Glucose 118 (H) 65 - 99 mg/dL    BUN 20 8 - 23 mg/dL    Creatinine 0.69 0.57 - 1.00 mg/dL    Sodium 139 136 - 145 mmol/L    Potassium 4.4 3.5 - 5.2 mmol/L    Chloride 102 98 - 107 mmol/L    CO2 26.0 22.0 - 29.0 mmol/L    Calcium 10.2 (H) 8.2 - 9.6 mg/dL    Total Protein 7.2 6.0 - 8.5 g/dL    Albumin 3.70 3.50 - 5.20 g/dL    ALT (SGPT) 34 (H) 1 - 33 U/L    AST (SGOT) 32 1 - 32 U/L    Alkaline Phosphatase 205 (H) 39 - 117 U/L    Total Bilirubin 0.5 0.0 - 1.2 mg/dL    eGFR Non African Amer 80 >60 mL/min/1.73    Globulin 3.5 gm/dL    A/G Ratio 1.1 g/dL    BUN/Creatinine Ratio 29.0 (H) 7.0 - 25.0    Anion Gap 11.0 5.0 - 15.0 mmol/L   BNP    Collection Time: 12/05/21  2:53 AM    Specimen: Blood   Result Value Ref Range    proBNP 853.2 0.0-1,800.0 pg/mL   Troponin    Collection Time: 12/05/21  2:53 AM    Specimen: Blood   Result Value Ref Range    Troponin T <0.010 0.000 -  0.030 ng/mL   Green Top (Gel)    Collection Time: 12/05/21  2:53 AM   Result Value Ref Range    Extra Tube Hold for add-ons.    Lavender Top    Collection Time: 12/05/21  2:53 AM   Result Value Ref Range    Extra Tube hold for add-on    Light Blue Top    Collection Time: 12/05/21  2:53 AM   Result Value Ref Range    Extra Tube hold for add-on    CBC Auto Differential    Collection Time: 12/05/21  2:53 AM    Specimen: Blood   Result Value Ref Range    WBC 13.21 (H) 3.40 - 10.80 10*3/mm3    RBC 4.94 3.77 - 5.28 10*6/mm3    Hemoglobin 15.0 12.0 - 15.9 g/dL    Hematocrit 47.2 (H) 34.0 - 46.6 %    MCV 95.5 79.0 - 97.0 fL    MCH 30.4 26.6 - 33.0 pg    MCHC 31.8 31.5 - 35.7 g/dL    RDW 14.7 12.3 - 15.4 %    RDW-SD 51.8 37.0 - 54.0 fl    MPV 10.1 6.0 - 12.0 fL    Platelets 276 140 - 450 10*3/mm3    Neutrophil % 71.7 42.7 - 76.0 %    Lymphocyte % 17.3 (L) 19.6 - 45.3 %    Monocyte % 7.5 5.0 - 12.0 %    Eosinophil % 1.0 0.3 - 6.2 %    Basophil % 0.5 0.0 - 1.5 %    Immature Grans % 2.0 (H) 0.0 - 0.5 %    Neutrophils, Absolute 9.48 (H) 1.70 - 7.00 10*3/mm3    Lymphocytes, Absolute 2.29 0.70 - 3.10 10*3/mm3    Monocytes, Absolute 0.99 (H) 0.10 - 0.90 10*3/mm3    Eosinophils, Absolute 0.13 0.00 - 0.40 10*3/mm3    Basophils, Absolute 0.06 0.00 - 0.20 10*3/mm3    Immature Grans, Absolute 0.26 (H) 0.00 - 0.05 10*3/mm3    nRBC 0.0 0.0 - 0.2 /100 WBC   D-dimer, Quantitative    Collection Time: 12/05/21  2:53 AM    Specimen: Blood   Result Value Ref Range    D-Dimer, Quantitative >20.00 (H) 0.00 - 0.56 MCGFEU/mL   Lactic Acid, Plasma    Collection Time: 12/05/21  2:53 AM    Specimen: Blood   Result Value Ref Range    Lactate 2.2 (C) 0.5 - 2.0 mmol/L   Procalcitonin    Collection Time: 12/05/21  2:53 AM    Specimen: Blood   Result Value Ref Range    Procalcitonin 0.07 0.00 - 0.25 ng/mL   Magnesium    Collection Time: 12/05/21  2:53 AM    Specimen: Blood   Result Value Ref Range    Magnesium 2.1 1.7 - 2.3 mg/dL   Phosphorus    Collection  Time: 12/05/21  2:53 AM    Specimen: Blood   Result Value Ref Range    Phosphorus 3.4 2.5 - 4.5 mg/dL   T4, Free    Collection Time: 12/05/21  2:53 AM    Specimen: Blood   Result Value Ref Range    Free T4 1.32 0.93 - 1.70 ng/dL   TSH    Collection Time: 12/05/21  2:53 AM    Specimen: Blood   Result Value Ref Range    TSH 14.360 (H) 0.270 - 4.200 uIU/mL   COVID-19, [facility]-HOU[facility],NASAL Swab (NO TRANSPORT MEDIA) 2 HR TAT - Swab, Nasopharynx    Collection Time: 12/05/21  2:54 AM    Specimen: Nasopharynx; Swab   Result Value Ref Range    COVID19 Presumptive Negative Presumptive Negative - Ref. Range   Urinalysis With Microscopic If Indicated (No Culture) - Urine, Catheter    Collection Time: 12/05/21  3:41 AM    Specimen: Urine, Catheter   Result Value Ref Range    Color, UA Yellow Yellow, Straw    Appearance, UA Clear Clear    pH, UA 5.5 5.0 - 8.0    Specific Gravity, UA 1.018 1.001 - 1.030    Glucose, UA Negative Negative    Ketones, UA Negative Negative    Bilirubin, UA Negative Negative    Blood, UA Negative Negative    Protein, UA Trace (A) Negative    Leuk Esterase, UA Trace (A) Negative    Nitrite, UA Negative Negative    Urobilinogen, UA 0.2 E.U./dL 0.2 - 1.0 E.U./dL   Urinalysis, Microscopic Only - Urine, Catheter    Collection Time: 12/05/21  3:41 AM    Specimen: Urine, Catheter   Result Value Ref Range    RBC, UA 0-2 None Seen, 0-2 /HPF    WBC, UA 6-12 (A) None Seen, 0-2 /HPF    Bacteria, UA 3+ (A) None Seen, Trace /HPF    Squamous Epithelial Cells, UA 7-12 (A) None Seen, 0-2 /HPF    Methodology Manual Light Microscopy    Blood Gas, Arterial With Co-Ox    Collection Time: 12/05/21  4:49 AM    Specimen: Arterial Blood   Result Value Ref Range    Site Right Brachial     Luis Armando's Test N/A     pH, Arterial 7.314 (L) 7.350 - 7.450 pH units    pCO2, Arterial 46.9 (H) 35.0 - 45.0 mm Hg    pO2, Arterial 298.0 (H) 83.0 - 108.0 mm Hg    HCO3, Arterial 23.8 20.0 - 26.0 mmol/L    Base Excess, Arterial -2.7 (L) 0.0 - 2.0  mmol/L    Hemoglobin, Blood Gas 14.6 14 - 18 g/dL    Hematocrit, Blood Gas 44.7 38.0 - 51.0 %    Oxyhemoglobin 98.7 94 - 99 %    Methemoglobin 0.60 0.00 - 1.50 %    Carboxyhemoglobin 0.5 0 - 2 %    CO2 Content 25.3 22 - 33 mmol/L    Temperature 37.0 C    Barometric Pressure for Blood Gas      Modality NRB     FIO2 100 %    Rate 0 Breaths/minute    PIP 0 cmH2O    IPAP 0     EPAP 0     Note      pH, Temp Corrected 7.314 pH Units    pCO2, Temperature Corrected 46.9 (H) 35 - 45 mm Hg    pO2, Temperature Corrected 298 (H) 83 - 108 mm Hg       If labs were ordered, I independently reviewed the results.        RADIOLOGY  CT Head Without Contrast    Result Date: 12/5/2021  CT Head WO HISTORY: Tension, possible fall with altered mental status TECHNIQUE: Axial unenhanced head CT. Radiation dose reduction techniques included automated exposure control or exposure modulation based on body size. Count of known CT and cardiac nuc med studies performed in previous 12 months: 3. Time of scan: 3:03 AM COMPARISON: 10/15/2021 FINDINGS: No intracranial hemorrhage, mass, or infarct. No hydrocephalus or extra-axial fluid collection. There are senescent changes, including volume loss and nonspecific white matter change, but no acute abnormality is seen. The skull base, calvarium, and extracranial soft tissues are normal.     Senescent changes without acute abnormality. Signer Name: Anthony Tse MD  Signed: 12/5/2021 3:13 AM  Workstation Name: [facility]  [facility]    XR Chest 1 View    Result Date: 12/5/2021  CR Chest 1 Vw INDICATION: Difficulty breathing and confusion COMPARISON:  CT chest 10/15/2021 FINDINGS: Portable AP view(s) of the chest.  Has bilateral breast implants which cause the appearance of increased density on each side. I believe there is also right upper lobe infiltrate. There may be some lingular infiltrate as well. Heart size normal. Previous vertebral plasty     There appears be increased  density in the upper lobe on the right and in the lingula on the left but is difficult to tell how much of the density is due to the bilateral breast implants. There is a  film from a CT scan from 10/15/2021 when the lungs were clear for comparison. I suspect patient has at least right-sided pneumonia and possibly lingular pneumonia as well Signer Name: Anthony Tse MD  Signed: 12/5/2021 3:54 AM  Workstation Name: [facility]  [facility] [place]      I ordered and reviewed the above noted radiographic studies.      I viewed images of chest x-ray which reveals bilateral pneumonia, head CT which reveals impressive atrophy but no acute abnormalities    See radiologist's dictation for official interpretation.        PROCEDURES  Point-of-care ultrasound: Point-of-care ultrasound performed of the heart and IVC to evaluate for causes of hypotension.  Phased-array probe used and images obtained included parasternal long, parasternal short, IVC in cross-section and sagittal orientation from the subxiphoid position.  IVC is flat and variable with respiration.  No pericardial effusion, but she is in atrial fibrillation with very poor ejection fraction definitely less than 20%, no evidence of right heart strain and parasternal short view with good circular left ventricle: No D sign.  Procedures    ECG 12 Lead         ECG 12 Lead             MEDICATIONS GIVEN IN ER    Medications   sodium chloride 0.9 % flush 10 mL (has no administration in time range)   Pharmacy to dose vancomycin (has no administration in time range)   norepinephrine (LEVOPHED) 8 mg in 250 mL NS infusion (premix) (has no administration in time range)   Norepinephrine-Sodium Chloride (LEVOPHED) 8-0.9 MG/250ML-% infusion solution  - ADS Override Pull (has no administration in time range)   vancomycin (dosing per levels) (has no administration in time range)   cefepime (MAXIPIME) 2 g/100 mL 0.9% NS (mbp) (has no administration in time range)    sodium chloride 0.9 % bolus 1,000 mL (1,000 mL Intravenous New Bag 12/5/21 0625)   sodium chloride 0.9 % bolus 1,000 mL (1,000 mL Intravenous New Bag 12/5/21 0627)   sodium chloride 0.9 % bolus 500 mL (0 mL Intravenous Stopped 12/5/21 0622)   vancomycin in dextrose 5% 150 mL (VANCOCIN) IVPB 750 mg (0 mg Intravenous Stopped 12/5/21 0622)   cefepime (MAXIPIME) 2 g/100 mL 0.9% NS (mbp) (0 g Intravenous Stopped 12/5/21 0623)   LORazepam (ATIVAN) injection 0.5 mg (0.25 mg Intravenous Given 12/5/21 0500)   HYDROmorphone (DILAUDID) injection 0.25 mg (0.25 mg Intravenous Given 12/5/21 0438)   sodium chloride 0.9 % bolus 500 mL (0 mL Intravenous Stopped 12/5/21 0622)         PROGRESS, DATA ANALYSIS, CONSULTS, AND MEDICAL DECISION MAKING    All labs have been independently reviewed by me.  All radiology studies have been reviewed by me and the radiologist dictating the report.   EKG's have been independently viewed and interpreted by me.      Differential diagnoses: Pneumonia, CHF, COPD, pneumothorax, pulmonary embolism, Covid      ED Course as of 12/05/21 0632   Sun Dec 05, 2021   0236 Patient presented for shortness of breath and new oxygen requirement from nursing facility. She has severe dementia and is DNR and there was great difficulty obtaining the initial EKG and when it was handed to me it was read as acute MI/STEMI because of what appears to me to be motion artifact in the anterior leads. When I went into the room she was contracted in all 4 extremities and laying on her right side shaking which is how the EKG was obtained. We were able to get her rolled over to her back and as still as possible and repeat EKG does not show ST elevation MI. Will obtain a troponin but my concern for STEMI is extremely low and I have not pursued activating the Cath Lab. [CC]   4506 In summary this a 89-year-old woman with advanced dementia who is DNR who presents from [facility] because of shortness of breath and new  oxygen requirement. EMS reported that she was at neurologic baseline upon arrival. Upon arrival here she is confused does not know where she is or what month it is. She denies pain and says she does not know why she is here. She is currently requiring oxygen by 2 L nasal cannula which is not baseline for her. Heart and lungs clear to auscultation. She is frail, cachectic, malnourished. There is possible reported fall within the last few days at the nursing home but patient denies pain and EMS says they were called for shortness of breath. Patient cannot elaborate on this. No other history was given at this time. [CC]   0247 Physical exam reveals an elderly, malnourished, woman lying in bed on her right side muscles contracted. She has a 2 L oxygen requirement but lungs clear to auscultation. Obtaining chest x-ray, CBC, CMP, BNP, troponin, D-dimer, procalcitonin, thyroid studies, lactate, Covid swab. Also because there is possible reported history of fall and she is so profoundly altered we will obtain a CT scan of the head just to rule out acute injury. Will reevaluate pending initial work-up. She will likely need admission to the hospital as she has a new oxygen requirement. [CC]   0328 CT scan of the head personally reviewed and reveals impressive atrophy but no acute findings. [CC]   0404 Chest x-ray reveals what appears to me to be a right-sided pneumonia but formal read is still pending.  However she has significant leukocytosis, elevated lactate and this particular case I am going to go ahead and initiate broad-spectrum antibiotics and start IV fluids.  Obtaining blood cultures. [CC]   0405 Formal read consistent with both right-sided pneumonia and lingular pneumonia. [CC]   0426 I was contacted by nursing who said that she has a higher oxygen requirement is now requiring a nonrebreather.  Upon reevaluation she does appear to have a new oxygen requirement but we are having difficulty getting a Plath.  She is  becoming agitated which makes me concern for hypoxia although this may be all dementia related.  Obtaining an ABG and I think she is awake enough that she can tolerate BiPAP so we will try that.  I know she is DNR but wanted to confirm DNI status.  I had a conversation with her son over the phone who said she is definitely DNR/DNI they understand that she is at the end of her life and preferences just for comfort care at this point.  I am giving her small doses of lorazepam and Dilaudid for agitation we will try the BiPAP.  Family is in route. [CC]   0508 ABG generally reassuring and certainly not consistent with hypoxia.  When we get a Plath she seems to be in the 90s and I think is oxygenating well.  She was acutely agitated and required multiple 0.25 mg doses of both lorazepam and Dilaudid and she is now resting comfortably which is what the son expressed to me the family's wishes were.  After she was comfortably resting she dropped her blood pressure to 60/40 and pressure bag 1 L of normal saline and which improved blood pressure to 90/60.  Family is in route and I will further discuss with them when they arrive. [CC]      ED Course User Index  [CC] Naeem Sim MD     Pressure responded well to IV fluids.  Point-of-care ultrasound reveals poor ejection fraction atrial fibrillation but also flat IVC.  At this point she has received over 2 L of fluid and is on broad-spectrum antibiotics with vancomycin and Zosyn.  The son and daughter-in-law arrived and confirmed that she is DNR/DNI.  We discussed the possibility of comfort care but the plan we came up with his we will continue broad-spectrum antibiotics and fluids and in a few hours the palliative team will meet with them and come up with a more formal plan.  I discussed this with Dr. Scott with internal medicine who was very helpful and came down also met with the family.  She had another episode of hypotension and pressors are at the bedside pressure  came up spontaneously.  Medicine plans to try BiPAP as well.  They will meet with palliative in the morning.  Family was very helpful and I think we accomplished the best thing we can for the patient throughout her stay in the ED.    60 minutes of critical care provided. This time excludes other billable procedures. Time does include preparation of documents, medical consultations, review of old records, and direct bedside care. Patient was at high risk for life-threatening deterioration due to sepsis and pneumonia with acute respiratory failure with hypoxia, hypotension requiring aggressive IV fluid resuscitation and maximal respiratory support in a patient who cannot be intubated due to DNR/DNI order.         AS OF 06:32 EST VITALS:    BP - 103/73  HR - 80  TEMP - 97.6 °F (36.4 °C) (Axillary)  O2 SATS - (!) 88%                  DIAGNOSIS  Final diagnoses:   Acute respiratory failure with hypoxia (HCC)   Pneumonia of both lungs due to infectious organism, unspecified part of lung   Dementia with behavioral disturbance, unspecified dementia type (HCC)   Severe malnutrition (CMS/HCC)         DISPOSITION  Admit             Naeem Sim MD  12/05/21 0645

## 2021-12-05 NOTE — H&P
[facility] Medicine Services  HISTORY AND PHYSICAL    Patient Name: Mary Ann Cooper  : 3/31/1930  MRN: [id number]  Primary Care Physician: Christi Gold MD  Date of admission: 2021      Subjective   Subjective     Chief Complaint:  Acute hypoxia     HPI:  Mary Ann Cooper is a 89 y.o. female with PMHx of dementia, hypothyroidism, hx of breast cancer, atrial fib,   Pt is now at [facility]. Pt was brought to [facility] ED after she was noted to be hypoxic. According to her son and DIL who are at the bedside the pt got up out of the bed and fell and hit her head. Her pulse ox was in the low 80%. So EMS was called. DIL reports she has had a cough and increased congestion that they noted on Friday.  Yesterday she seemed some better and then seemed to become progressively worse. Family reports they had also noted pt was having increased work to breath. Family reports she is has had increased anxiety since recently moving in to [facility] and she has been more fearful and agitated since she is no longer living with her . Upon arrival to the ED pt was noted to have Right upper lobe and lingular pneumonia. She was also noted to be hypoxic and hypotensive. She was started on Cefepime and Vancomycin and given IV fluids. She responded transiently to fluid resuscitation and again became hypotensive. Pt family requested she be DNR/DNI. Pt will be admitted to Hospitalist Service for further evaluation and management.     COVID Details:    Symptoms:    [] NONE [] Fever [x]  Cough [x] Shortness of breath [] Change in taste/smell      The patient has started, but not completed, their COVID-19 vaccination series.  Pt had 1st and 2nd Covid vaccines.     Review of Systems   Constitutional: Positive for activity change, appetite change, chills and fatigue. Negative for fever.   HENT: Negative for congestion, ear pain, rhinorrhea and sore throat.    Respiratory: Positive for cough  and shortness of breath. Negative for wheezing.    Cardiovascular: Negative for chest pain, palpitations and leg swelling.   Gastrointestinal: Negative for abdominal pain, blood in stool, constipation, diarrhea, nausea and vomiting.   Genitourinary: Negative for dysuria and hematuria.   Musculoskeletal: Negative for back pain.   Skin: Negative.    Neurological: Positive for weakness. Negative for dizziness, light-headedness and headaches.   Psychiatric/Behavioral: Positive for agitation. Negative for dysphoric mood and sleep disturbance. The patient is nervous/anxious (more anxious since seperated from her  ).         All other systems reviewed and are negative.     Personal History     Past Medical History:   Diagnosis Date   • Breast cancer (HCC)    • Cancer (HCC)    • Compression fracture of vertebrae (HCC)    • Disease of thyroid gland    • Hypertension    • Thyroid cancer (HCC)        Past Surgical History:   Procedure Laterality Date   • BREAST SURGERY     • HYSTERECTOMY     • KYPHOPLASTY     • THYROID SURGERY         Family History:  family history includes Heart attack in her father. Otherwise pertinent FHx was reviewed and unremarkable.     Social History:  reports that she has never smoked. She has never used smokeless tobacco. She reports previous alcohol use. She reports that she does not use drugs.  Social History     Social History Narrative    Resides at [facility]      is living     Son is Power of Willie.     2 other sons, and 4 step children        Medications:  Available home medication information reviewed.  No current facility-administered medications on file prior to encounter.     Current Outpatient Medications on File Prior to Encounter   Medication Sig Dispense Refill   • levothyroxine sodium (TIROSINT) 88 MCG capsule Take 88 mcg by mouth Daily.     • metoprolol tartrate 37.5 MG tablet Take 37.5 mg by mouth Every 12 (Twelve) Hours.     • mirtazapine  (REMERON SOL-TAB) 15 MG disintegrating tablet Place 1 tablet on the tongue Every Night.     • magnesium, as, gluconate (MAGONATE) 500 (27 Mg) MG tablet Take 1 tablet by mouth Daily.     • potassium chloride (MICRO-K) 10 MEQ CR capsule Take 2 capsules by mouth Daily.           Allergies   Allergen Reactions   • Codeine Unknown - High Severity   • Levothyroxine Unknown - Low Severity   • Penicillins Unknown - High Severity     ** tolerates ceftriaxone **   • Propoxyphene Nausea And Vomiting       Objective   Objective     Vital Signs:   Temp:  [97.6 °F (36.4 °C)] 97.6 °F (36.4 °C)  Heart Rate:  [] 80  Resp:  [26] 26  BP: ()/(39-96) 72/52  Flow (L/min):  [6] 6       Physical Exam  Vitals and nursing note reviewed.   Constitutional:       General: She is not in acute distress.     Appearance: She is cachectic. She is ill-appearing and toxic-appearing. She is not diaphoretic.   HENT:      Head: Atraumatic.      Right Ear: External ear normal.      Left Ear: External ear normal.   Eyes:      General:         Right eye: No discharge.         Left eye: No discharge.      Conjunctiva/sclera: Conjunctivae normal.   Neck:      Vascular: JVD present.   Cardiovascular:      Rate and Rhythm: Normal rate and regular rhythm.      Heart sounds: No murmur heard.      Pulmonary:      Effort: Pulmonary effort is normal.      Breath sounds: Wheezing and rales present.   Abdominal:      General: Bowel sounds are normal. There is no distension.      Palpations: Abdomen is soft.      Tenderness: There is no abdominal tenderness.   Musculoskeletal:      Cervical back: Normal range of motion and neck supple.      Right lower leg: No edema.      Left lower leg: No edema.   Skin:     Findings: No rash.   Neurological:      Mental Status: She is oriented to person, place, and time. She is lethargic.   Psychiatric:      Comments: Unresponsive             Result Review:  I have personally reviewed the results from the time of this  admission to 12/5/2021 05:52 EST and agree with these findings:  [x]  Laboratory  [x]  Microbiology  [x]  Radiology  [x]  EKG/Telemetry   []  Cardiology/Vascular   []  Pathology  []  Old records  []  Other:  Most notable findings include:     LAB RESULTS:      Lab 12/05/21 0253   WBC 13.21*   HEMOGLOBIN 15.0   HEMATOCRIT 47.2*   PLATELETS 276   NEUTROS ABS 9.48*   IMMATURE GRANS (ABS) 0.26*   LYMPHS ABS 2.29   MONOS ABS 0.99*   EOS ABS 0.13   MCV 95.5   PROCALCITONIN 0.07   LACTATE 2.2*   D DIMER QUANT >20.00*         Lab 12/05/21 0253   SODIUM 139   POTASSIUM 4.4   CHLORIDE 102   CO2 26.0   ANION GAP 11.0   BUN 20   CREATININE 0.69   GLUCOSE 118*   CALCIUM 10.2*   MAGNESIUM 2.1   PHOSPHORUS 3.4   TSH 14.360*         Lab 12/05/21  0253   TOTAL PROTEIN 7.2   ALBUMIN 3.70   GLOBULIN 3.5   ALT (SGPT) 34*   AST (SGOT) 32   BILIRUBIN 0.5   ALK PHOS 205*         Lab 12/05/21  0253   PROBNP 853.2   TROPONIN T <0.010                 Lab 12/05/21  0449   PH, ARTERIAL 7.314*   PCO2, ARTERIAL 46.9*   PO2 .0*   FIO2 100   HCO3 ART 23.8   BASE EXCESS ART -2.7*   CARBOXYHEMOGLOBIN 0.5     UA    Urinalysis 10/15/21 10/15/21 12/5/21 12/5/21    1544 1544 0341 0341   Squamous Epithelial Cells, UA  0-2  7-12 (A)   Specific [place], UA 1.024  1.018    Ketones, UA Trace (A)  Negative    Blood, UA Trace (A)  Negative    Leukocytes, UA Trace (A)  Trace (A)    Nitrite, UA Positive (A)  Negative    RBC, UA  7-12 (A)  0-2   WBC, UA  6-12 (A)  6-12 (A)   Bacteria, UA  4+ (A)  3+ (A)   (A) Abnormal value              Microbiology Results (last 10 days)     Procedure Component Value - Date/Time    COVID PRE-OP / PRE-PROCEDURE SCREENING ORDER (NO ISOLATION) - Swab, Nasopharynx [[id number]]  (Normal) Collected: 12/05/21 0254    Lab Status: Final result Specimen: Swab from Nasopharynx Updated: 12/05/21 0351    Narrative:      The following orders were created for panel order COVID PRE-OP / PRE-PROCEDURE SCREENING ORDER (NO ISOLATION) -  Swab, Nasopharynx.  Procedure                               Abnormality         Status                     ---------                               -----------         ------                     COVID-19, ABBOTT IN-HOUS...[[id number]]  Normal              Final result                 Please view results for these tests on the individual orders.    COVID-19, ABBOTT IN-HOUSE,NASAL Swab (NO TRANSPORT MEDIA) 2 HR TAT - Swab, Nasopharynx [[id number]]  (Normal) Collected: 12/05/21 0254    Lab Status: Final result Specimen: Swab from Nasopharynx Updated: 12/05/21 0351     COVID19 Presumptive Negative    Narrative:      Fact sheet for providers: https://www.fda.gov/media/493981/download     Fact sheet for patients: https://www.fda.gov/media/353620/download    Test performed by PCR.  If inconsistent with clinical signs and symptoms patient should be tested with different authorized molecular test.          CT Head Without Contrast    Result Date: 12/5/2021  CT Head WO HISTORY: Tension, possible fall with altered mental status TECHNIQUE: Axial unenhanced head CT. Radiation dose reduction techniques included automated exposure control or exposure modulation based on body size. Count of known CT and cardiac nuc med studies performed in previous 12 months: 3. Time of scan: 3:03 AM COMPARISON: 10/15/2021 FINDINGS: No intracranial hemorrhage, mass, or infarct. No hydrocephalus or extra-axial fluid collection. There are senescent changes, including volume loss and nonspecific white matter change, but no acute abnormality is seen. The skull base, calvarium, and extracranial soft tissues are normal.     Impression: Senescent changes without acute abnormality. Signer Name: Anthony Tse MD  Signed: 12/5/2021 3:13 AM  Workstation Name: [email or web address]  [facility]    XR Chest 1 View    Result Date: 12/5/2021  CR Chest 1 Vw INDICATION: Difficulty breathing and confusion COMPARISON:  CT chest 10/15/2021 FINDINGS: Portable  AP view(s) of the chest.  Has bilateral breast implants which cause the appearance of increased density on each side. I believe there is also right upper lobe infiltrate. There may be some lingular infiltrate as well. Heart size normal. Previous vertebral plasty     Impression: There appears be increased density in the upper lobe on the right and in the lingula on the left but is difficult to tell how much of the density is due to the bilateral breast implants. There is a  film from a CT scan from 10/15/2021 when the lungs were clear for comparison. I suspect patient has at least right-sided pneumonia and possibly lingular pneumonia as well Signer Name: Anthony Tse MD  Signed: 12/5/2021 3:54 AM  Workstation Name: [facility]  [facility] [place]          Assessment/Plan   Assessment & Plan     Active Hospital Problems    Diagnosis  POA   • Acute respiratory failure with hypoxia (HCC) [J96.01]  Yes     Priority: High   • Sepsis associated hypotension (HCC) [A41.9, I95.9]  Unknown     Priority: High   • Sepsis due to pneumonia (HCC) [J18.9, A41.9]  Unknown     Priority: High   • Essential hypertension [I10]  Yes   • Dementia (HCC) [F03.90]  Yes   • Hypothyroidism [E03.9]  Yes     PLAN:  --Admit pt to hospitalist service. Had long discussion with pt son Willie and daughter-in-law. They do want pt to be DNR/DNI. But agree to IV antibiotics. Discussed the complexity of CHF and sepsis secondary to pneumonia. We discussed treating Pneumonia with cefepime and vancomycin. And family agrees to hospice consult. However for now with will start fixed dose of dopamine until pt Son can discuss with her .   --Pt with worsening dementia-agitated on arrival and required IV ativan in the ED. Now sedated.   --TSH elevated. We will give IV levothyroxine until the pt is more alert.   --Decreased EF on bedside echo completed in the ED-noted to be 15%.   --consult Hospice.   --due to hypoxic respiratory failure  with start trial of bipap.     DVT prophylaxis: heparin       CODE STATUS:    Code Status and Medical Interventions:   Ordered at: 12/05/21 0555     Medical Intervention Limits:    NO intubation (DNI)     Level Of Support Discussed With:    Next of Kin (If No Surrogate)     Code Status (Patient has no pulse and is not breathing):    No CPR (Do Not Attempt to Resuscitate)     Medical Interventions (Patient has pulse or is breathing):    Limited Support       Admission Status:  I believe this patient meets INPATIENT status due to acute respiratory failure secondary to pneumonia and sepsis.  I feel patient’s risk for adverse outcomes and need for care warrant INPATIENT evaluation and I predict the patient’s care encounter to likely last beyond 2 midnights.      Chayito Scott, DO  12/05/21

## 2021-12-05 NOTE — PLAN OF CARE
Problem: Adult Inpatient Plan of Care  Goal: Plan of Care Review  Outcome: Ongoing, Progressing  Flowsheets (Taken 12/5/2021 1409)  Progress: declining  Plan of Care Reviewed With: patient  Outcome Summary: VS poor. Pt now on nonrebreather wide open. Helping turn PRN. Pt fx at BS. Wearing brief. 1x PRN needed thus far. May try and switch to nasal cannula soon if tolerates. Hospice to consult. Continue comfort care.   Goal Outcome Evaluation:  Plan of Care Reviewed With: patient        Progress: declining  Outcome Summary: VS poor. Pt now on nonrebreather wide open. Helping turn PRN. Pt fx at BS. Wearing brief. 1x PRN needed thus far. May try and switch to nasal cannula soon if tolerates. Hospice to consult. Continue comfort care.

## 2021-12-06 PROBLEM — G31.1 SENILE DEGENERATION OF BRAIN (HCC): Status: ACTIVE | Noted: 2021-01-01

## 2021-12-06 NOTE — PLAN OF CARE
Goal Outcome Evaluation:f/c has been placed with small amount of urine returned.  RN has been giving PRN doses of medication to get Mary Ann to a comfortable place.  Continue to monitor.

## 2021-12-06 NOTE — PROGRESS NOTES
Continued Stay Note  [facility]     Patient Name: Mary Ann Cooper  MRN: [id number]  Today's Date: 12/6/2021    Admit Date: 12/6/2021     Discharge Plan     Row Name 12/06/21 1130       Plan    Plan IPU Admmission    Plan Comments Patient admitted to inpatient hospice today. POC formulated with the family and new orders received. Dr. Rankin aware of admission.      Final Discharge Disposition Code 51 - hospice medical facility               Discharge Codes    No documentation.                     Shannon Corona RN

## 2021-12-06 NOTE — CASE MANAGEMENT/SOCIAL WORK
Case Management Discharge Note      Final Note: The patient has been discharged to hospice at this time.         Selected Continued Care - Admitted Since 12/6/2021     Destination    No services have been selected for the patient.              Durable Medical Equipment    No services have been selected for the patient.              Dialysis/Infusion    No services have been selected for the patient.              Home Medical Care    No services have been selected for the patient.              Therapy    No services have been selected for the patient.              Community Resources    No services have been selected for the patient.              Community & DME    No services have been selected for the patient.                Selected Continued Care - Prior Encounters Includes selections from prior encounters from 9/7/2021 to 12/6/2021    Discharged on 10/22/2021 Admission date: 10/15/2021 - Discharge disposition: Skilled Nursing Facility (DC - External)    Destination     Service Provider Selected Services Address Phone Fax Patient Preferred    [facility]  [facility] [address] [address] [phone] --                         Final Discharge Disposition Code: 50 - home with hospice

## 2021-12-06 NOTE — PLAN OF CARE
Goal Outcome Evaluation:           Progress: declining  Outcome Summary: Patient remains unresponsive and has labored, shallow breathing. Family to meet for hospice consult in the morning. Will continue to monitor.

## 2021-12-06 NOTE — PROGRESS NOTES
Clinical Nutrition       Patient Name: Mary Ann Cooper  YOB: 1930  MRN: [id number]  Date of Encounter: 12/06/21 09:07 EST  Admission date: 12/5/2021      Reason for Visit   chewing and swallowing issues       EMR  Reviewed   Yes    Per SLP note (12/5)    (Spoke with MD. Plan is for comfort care. MD has requested input re: appropriate comfort diet and requested SLP see pt tomorrow.)     Reported/Observed/Food/Nutrition Related - Comments     RD observed patient with order for regular diet. Patient also with status of comfort measures.      Current Nutrition Prescription     Diet Regular  No active supplement orders      Evaluation of Received Nutrient/Fluid Intake:  Insufficient data       Actions     Care plan reviewed  Diet per SLP recommendation.       Lilia Schroeder RD,   Time Spent: 10

## 2021-12-06 NOTE — H&P
Hospice History and Physical     Patient Name:  Mary Ann Cooper   : 3/31/1930   Sex: female    Patient Care Team:  Christi Gold MD as PCP - General (Internal Medicine)    Code Status: Comfort Measures    Subjective     Per Hospitalist HP:    Mary Ann Cooper is a 89 y.o. female with PMHx of dementia, hypothyroidism, hx of breast cancer, atrial fib,   Pt is now at [facility]. Pt was brought to [facility] ED after she was noted to be hypoxic. According to her son and DIL who are at the bedside the pt got up out of the bed and fell and hit her head. Her pulse ox was in the low 80%. So EMS was called. DIL reports she has had a cough and increased congestion that they noted on Friday.  Yesterday she seemed some better and then seemed to become progressively worse. Family reports they had also noted pt was having increased work to breath. Family reports she is has had increased anxiety since recently moving in to [facility] and she has been more fearful and agitated since she is no longer living with her . Upon arrival to the ED pt was noted to have Right upper lobe and lingular pneumonia. She was also noted to be hypoxic and hypotensive. She was started on Cefepime and Vancomycin and given IV fluids. She responded transiently to fluid resuscitation and again became hypotensive. Pt family requested she be DNR/DNI.    [end of copied text]    Ultimately family elected for Comfort Measures. Ms. 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     Review of Systems  Review of Systems   Unable to perform ROS: Acuity of condition       History  Past Medical History:   Diagnosis Date   • Breast cancer (HCC)    • Cancer (HCC)    • Compression fracture of vertebrae (HCC)    • Disease of thyroid gland    • Hypertension    • Thyroid cancer (HCC)      Past Surgical History:   Procedure Laterality Date   • BREAST SURGERY     • HYSTERECTOMY     •  KYPHOPLASTY     • THYROID SURGERY       Current Facility-Administered Medications   Medication Dose Route Frequency Provider Last Rate Last Admin   • [START ON 12/7/2021] acetaminophen (TYLENOL) suppository 650 mg  650 mg Rectal Q4H PRN Suzy Isaac, APRN       • bisacodyl (DULCOLAX) suppository 10 mg  10 mg Rectal Daily PRN Suzy Isaac H, APRN       • bumetanide (BUMEX) injection 0.5 mg  0.5 mg Intravenous Q8H Suzy Isaac, APRN       • bumetanide (BUMEX) injection 0.5 mg  0.5 mg Intravenous Q8H PRN Suzy Isaac, APRN       • glycopyrrolate (ROBINUL) injection 0.2 mg  0.2 mg Intravenous Q6H PRN Suzy Isaac, APRN       • haloperidol lactate (HALDOL) injection 1 mg  1 mg Intravenous Q6H PRN Suzy Isaac, APRN       • HYDROmorphone (DILAUDID) injection 0.5 mg  0.5 mg Intravenous Q4H Suzy Isaac, APRN       • HYDROmorphone (DILAUDID) injection 0.5 mg  0.5 mg Intravenous Q1H PRN Suzy Isaac, APRN       • ketorolac (TORADOL) injection 15 mg  15 mg Intravenous Q6H PRN Suzy Isaac, APRN       • LORazepam (ATIVAN) injection 0.5 mg  0.5 mg Intravenous Q8H Suzy Isaac, APRN       • LORazepam (ATIVAN) injection 0.5 mg  0.5 mg Intravenous Q4H PRN Suzy Isaac, APRN       • ondansetron (ZOFRAN) injection 4 mg  4 mg Intravenous Q6H PRN Suzy Isaac, APRN       • palliative care oral rinse   Mouth/Throat PRN Suzy Isaac, APRN       • polyvinyl alcohol (LIQUIFILM) 1.4 % ophthalmic solution 2 drop  2 drop Both Eyes Q1H PRN Suzy Isaac, APRN       • scopolamine patch 1 mg/72 hr  1 patch Transdermal Q72H PRN Suzy Isaac, APRN       • scopolamine patch 1 mg/72 hr  1 patch Transdermal Q72H Suzy Isaac, APRN            •  [START ON 12/7/2021] acetaminophen  •  bisacodyl  •  bumetanide  •  glycopyrrolate  •  haloperidol lactate  •  HYDROmorphone  •  ketorolac  •  LORazepam  •  ondansetron  •  palliative care oral rinse  •  polyvinyl  alcohol  •  Scopolamine  Allergies   Allergen Reactions   • Codeine Unknown - High Severity   • Penicillins Unknown - High Severity     ** tolerates ceftriaxone **   • Propoxyphene Nausea And Vomiting     Family History   Problem Relation Age of Onset   • Heart attack Father      Social History     Socioeconomic History   • Marital status:    Tobacco Use   • Smoking status: Never Smoker   • Smokeless tobacco: Never Used   Substance and Sexual Activity   • Alcohol use: Not Currently     Comment: previously wine drinker   • Drug use: Never   • Sexual activity: Defer       Objective     Vital Signs  Temp:  [98.4 °F (36.9 °C)] 98.4 °F (36.9 °C)  Heart Rate:  [101] 101  Resp:  [27-29] 27  BP: (91)/(55) 91/55    PPS: Palliative Performance Scale score as of 12/7/2021, 14:07 EST is 10% based on the following measures:   Ambulation: Totally bed bound  Activity and Evidence of Disease: Unable to do any work, extensive evidence of disease  Self-Care: Total care  Intake:  Mouth care only  LOC: Drowsy or coma      Physical Exam:  Physical Exam  Constitutional:       Appearance: She is ill-appearing.      Comments: Frail, thin, elderly, could possibly be more comfortable   HENT:      Head:      Comments: + temporal wasting     Nose:      Comments: DRY MM     Mouth/Throat:      Mouth: Mucous membranes are dry.   Eyes:      Comments: closed   Neck:      Comments: thin  Pulmonary:      Comments: Quite tachypneic - RR 50; no audible congestion; respirations quick and shallow  Abdominal:      Palpations: Abdomen is soft.      Comments: Hypoactive BSx4   Musculoskeletal:      Right lower leg: No edema.      Left lower leg: No edema.   Skin:     General: Skin is dry.      Coloration: Skin is pale.   Neurological:      Comments: Does not follow commands   Psychiatric:      Comments: + facial grimacing; no moaning       Results Review:   No results found for: HGBA1C    Lab Results   Component Value Date    GLUCOSE 118 (H)  12/05/2021    BUN 20 12/05/2021    CREATININE 0.69 12/05/2021    EGFRIFNONA 80 12/05/2021    EGFRIFAFRI 68 10/15/2021    BCR 29.0 (H) 12/05/2021    K 4.4 12/05/2021    CO2 26.0 12/05/2021    CALCIUM 10.2 (H) 12/05/2021    ALBUMIN 3.70 12/05/2021    AST 32 12/05/2021    ALT 34 (H) 12/05/2021       WBC   Date Value Ref Range Status   12/05/2021 13.21 (H) 3.40 - 10.80 10*3/mm3 Final     RBC   Date Value Ref Range Status   12/05/2021 4.94 3.77 - 5.28 10*6/mm3 Final     Hemoglobin   Date Value Ref Range Status   12/05/2021 15.0 12.0 - 15.9 g/dL Final     Hematocrit   Date Value Ref Range Status   12/05/2021 47.2 (H) 34.0 - 46.6 % Final     MCV   Date Value Ref Range Status   12/05/2021 95.5 79.0 - 97.0 fL Final     MCH   Date Value Ref Range Status   12/05/2021 30.4 26.6 - 33.0 pg Final     MCHC   Date Value Ref Range Status   12/05/2021 31.8 31.5 - 35.7 g/dL Final     RDW   Date Value Ref Range Status   12/05/2021 14.7 12.3 - 15.4 % Final     RDW-SD   Date Value Ref Range Status   12/05/2021 51.8 37.0 - 54.0 fl Final     MPV   Date Value Ref Range Status   12/05/2021 10.1 6.0 - 12.0 fL Final     Platelets   Date Value Ref Range Status   12/05/2021 276 140 - 450 10*3/mm3 Final     Neutrophil %   Date Value Ref Range Status   12/05/2021 71.7 42.7 - 76.0 % Final     Lymphocyte %   Date Value Ref Range Status   12/05/2021 17.3 (L) 19.6 - 45.3 % Final     Monocyte %   Date Value Ref Range Status   12/05/2021 7.5 5.0 - 12.0 % Final     Eosinophil %   Date Value Ref Range Status   12/05/2021 1.0 0.3 - 6.2 % Final     Basophil %   Date Value Ref Range Status   12/05/2021 0.5 0.0 - 1.5 % Final     Immature Grans %   Date Value Ref Range Status   12/05/2021 2.0 (H) 0.0 - 0.5 % Final     Neutrophils, Absolute   Date Value Ref Range Status   12/05/2021 9.48 (H) 1.70 - 7.00 10*3/mm3 Final     Lymphocytes, Absolute   Date Value Ref Range Status   12/05/2021 2.29 0.70 - 3.10 10*3/mm3 Final     Monocytes, Absolute   Date Value Ref  Range Status   12/05/2021 0.99 (H) 0.10 - 0.90 10*3/mm3 Final     Eosinophils, Absolute   Date Value Ref Range Status   12/05/2021 0.13 0.00 - 0.40 10*3/mm3 Final     Basophils, Absolute   Date Value Ref Range Status   12/05/2021 0.06 0.00 - 0.20 10*3/mm3 Final     Immature Grans, Absolute   Date Value Ref Range Status   12/05/2021 0.26 (H) 0.00 - 0.05 10*3/mm3 Final     nRBC   Date Value Ref Range Status   12/05/2021 0.0 0.0 - 0.2 /100 WBC Final         Senile degeneration of brain (HCC)      Assessment/Plan   Assessment/Plan:     89yoF admitted inpt Hospice 12/6 for senile degeneration of brain    Unresponsive  Congestion  Constipation  Anxiety  Dyspnea  Pain, nos, existential    Bumex 0.5mg q8h    Dilaudid 0.5mg q4h    Ativan 0.5mg q8h    Scop patch    Palliative oral rinse and eye gtts scheduled and prn    [place] please    LBM 12/5    Coordinated care with Nursing and Hospice IDT    Discussion at bedside with loving family regarding end of life s/s and symptom mgmt. Support and Hospice contact information provided.    Total Visit Time: >65min  Face to Face Time: 25min    Justification for care:  Patient meets criteria for acute in-patient care with required nursing assessment and interventions for symptoms with IV medications.      Suzy Isaac DNP, MHA, APRN  [facility]  Hospice and Palliative Care Nurse Practitioner  12/06/21  11:44 EST

## 2021-12-06 NOTE — INTERVAL H&P NOTE
Pt admitted to [facility] on 12/6/2021. Please refer to Hospice documentation for further information.

## 2021-12-06 NOTE — DISCHARGE SUMMARY
[facility] Medicine Services  DISCHARGE TO INPATIENT HOSPICE    Patient Name: Mary Ann Cooper  : 3/31/1930  MRN: [id number]    Date of Admission: 2021  Date of Discharge:  21  Primary Care Physician: Christi Gold MD    Consults     No orders found for last 30 day(s).        Hospital Course     Presenting Problem:   Acute respiratory failure with hypoxia (HCC) [J96.01]    Active Hospital Problems    Diagnosis  POA   • Acute respiratory failure with hypoxia (HCC) [J96.01]  Yes   • Sepsis associated hypotension (HCC) [A41.9, I95.9]  Unknown   • Sepsis due to pneumonia (HCC) [J18.9, A41.9]  Unknown   • Acute systolic heart failure (HCC) [I50.21]  Unknown   • Essential hypertension [I10]  Yes   • Dementia (HCC) [F03.90]  Yes   • Hypothyroidism [E03.9]  Yes   • Atrial fibrillation (HCC) [I48.91]  Yes      Resolved Hospital Problems   No resolved problems to display.          Hospital Course:  Acute respiratory failure with hypoxia  Acute systolic heart failure  Sepsis related to pneumonia  Hypotension  -Chest x-ray consolidation in the right upper lobe lesion on the left  -Currently too unstable for CTA  -Point-of-care ultrasound with EF 20%; D-dimer greater than 20  -Patient likely has PE causing significant hemodynamic instability  -Reviewed goals of care with son and daughter-in-law at bedside as well as son on the phone.  Both are in agreement for a comfort approach.  Patient was admitted to the oncology floor and hospice orders were placed.  Inpatient hospice was consulted and she was transitioned to inpatient hospice on discharge.  Initially was on antibiotics but this was discontinued after speaking to son more on .    Fall  -CT head with no acute findings    Day of Discharge     HPI:   No acute events.  Son at bedside.  States that he felt that she was comfortable over the night.  Looking forward to a hospice conversation today.    ROS:  Unable to obtain due to  mental status    Vital Signs:   Temp:  [98.4 °F (36.9 °C)] 98.4 °F (36.9 °C)  Heart Rate:  [101] 101  Resp:  [27-29] 27  BP: (91)/(55) 91/55     Physical Exam:  Constitutional: ill appearing; frail   HENT: NCAT, mucous membranes dry  Respiratory: coarse bilaterally  Cardiovascular: RRR, II/VI murmur   Gastrointestinal: Positive bowel sounds, soft, nontender, nondistended  Musculoskeletal: No bilateral ankle edema  Psychiatric:unresponsive   Neurologic: unresponsive   Skin: No rashes      Discharge Details     Discharge Disposition:  Transfer care to inpatient Hospice at [facility]    Time Spent on Discharge:  35 minutes    Rebeca Rankin DO  12/06/21

## 2021-12-07 NOTE — DISCHARGE SUMMARY
Date of Death:  12/7/2021  Time of Death:  0010    Presenting Problem/History of Present Illness    Senile degeneration of brain (HCC)      Hospital Course    Per Hospitalist HP:     Mary Ann Cooper is a 89 y.o. female with PMHx of dementia, hypothyroidism, hx of breast cancer, atrial fib,   Pt is now at [facility]. Pt was brought to [facility] ED after she was noted to be hypoxic. According to her son and AILYN who are at the bedside the pt got up out of the bed and fell and hit her head. Her pulse ox was in the low 80%. So EMS was called. DIL reports she has had a cough and increased congestion that they noted on Friday.  Yesterday she seemed some better and then seemed to become progressively worse. Family reports they had also noted pt was having increased work to breath. Family reports she is has had increased anxiety since recently moving in to [facility] and she has been more fearful and agitated since she is no longer living with her . Upon arrival to the ED pt was noted to have Right upper lobe and lingular pneumonia. She was also noted to be hypoxic and hypotensive. She was started on Cefepime and Vancomycin and given IV fluids. She responded transiently to fluid resuscitation and again became hypotensive. Pt family requested she be DNR/DNI.     [end of copied text]     Ultimately family elected for Comfort Measures. Ms. Cooper was admitted to [facility] on 12/6/2021 for mgmt of acute symptoms 2/2 senile degeneration of the brain.     Social History:  Social History     Tobacco Use   • Smoking status: Never Smoker   • Smokeless tobacco: Never Used   Substance Use Topics   • Alcohol use: Not Currently     Comment: previously wine drinker         Consults:   Consults     No orders found for last 30 day(s).          Suzy Isaac, PADDY, MHA, APRN  [facility]  Hospice and Palliative Care Nurse Practitioner  12/07/21  14:28 EST

## 2021-12-10 LAB
BACTERIA SPEC AEROBE CULT: NORMAL
BACTERIA SPEC AEROBE CULT: NORMAL

## 2022-03-04 NOTE — CONSULTS
Certificate has been faxed back to the  home Christi Gold MD  Consulting physician: Ozzie Souza   Reason for referral: goc discussion, ACP  Chief Complaint   Patient presents with   • Weakness - Generalized     HPI:   89 y.o. female with Hx of breast cancer, dementia, hypothyroidism, DDD recently undergoing kyphoplasty 1 month ago at [facility] per Dr Eugene  who has brought to the hospital [place] [facility] [place] for evaluation of diarrhea and confusion on 10/15. On arrival to ED patient was found to be in Afib with RVR, 121HR.  Patient has been managed for Afib, acute cystitis - Enterobacter Cloacae, diarrhea no CDiff detected, chronic back pain, asymmetry bladder wall thickening with recommendations for outpatient urology follow up after urine infection cleared and hypothyroidism.   Symptoms:   No family in room.   Patient resting on side, eyes closed.   Intermittent replies to yes/no questions, denies pain, nausea, no pain reported with palpation of spine  Nursing reports patient is taking po meds with applesauce  Advance care planning discussed:   Code Status:   Current Code Status     Date Active Code Status Order ID Comments User Context       10/15/2021 1946 No CPR [id number]  Sharona Frey APRN ED     Advance Care Planning Activity      Questions for Current Code Status     Question Answer    Code Status No CPR    Medical Interventions (Level of Support Prior to Arrest) Limited    Limited Support to NOT Include Intubation    Level Of Support Discussed With Health Care Surrogate        Advance Directive: reviewed on medical record  Surrogate decision maker: sonWillie  Past Medical History:   Diagnosis Date   • Cancer (HCC)    • Disease of thyroid gland    • Hypertension      Past Surgical History:   Procedure Laterality Date   • BREAST SURGERY     • HYSTERECTOMY         Reviewed current scheduled and prn medications for route, type, dose and frequency.    Current Facility-Administered Medications   Medication Dose Route  Frequency Provider Last Rate Last Admin   • acetaminophen (TYLENOL) tablet 650 mg  650 mg Oral Q4H PRN Shante, Sharona, APRN   650 mg at 10/17/21 2059    Or   • acetaminophen (TYLENOL) 160 MG/5ML solution 650 mg  650 mg Oral Q4H PRN Shante, Sharona, APRN   649.6 mg at 10/16/21 1441    Or   • acetaminophen (TYLENOL) suppository 650 mg  650 mg Rectal Q4H PRN Shante, Sharona, APRN   650 mg at 10/18/21 1806   • cefTRIAXone (ROCEPHIN) 1 g/100 mL 0.9% NS (MBP)  1 g Intravenous Q24H Shante, Sharona, APRN   1 g at 10/19/21 0954   • heparin (porcine) 5000 UNIT/ML injection 5,000 Units  5,000 Units Subcutaneous Q12H Shante, Sharona, APRN   5,000 Units at 10/19/21 0954   • lactated ringers infusion  75 mL/hr Intravenous Continuous Terry Sumner DO 75 mL/hr at 10/18/21 2047 75 mL/hr at 10/18/21 2047   • levothyroxine (SYNTHROID, LEVOTHROID) tablet 75 mcg  75 mcg Oral Q AM Shante, Sharona, APRN   75 mcg at 10/19/21 0528   • melatonin tablet 5 mg  5 mg Oral Nightly PRN Nico Cabral PA-C   5 mg at 10/16/21 2231   • metoprolol tartrate (LOPRESSOR) tablet 25 mg  25 mg Oral Q12H Terry Sumner, DO   25 mg at 10/19/21 0954   • mirtazapine (REMERON SOL-TAB) disintegrating tablet 15 mg  15 mg Oral Nightly Terry Sumner, DO   15 mg at 10/18/21 2048   • potassium chloride (MICRO-K) CR capsule 40 mEq  40 mEq Oral PRN Shante, Sharona, APRN   40 mEq at 10/16/21 0628    Or   • potassium chloride (KLOR-CON) packet 40 mEq  40 mEq Oral PRN Shante, Sharona, APRN   40 mEq at 10/16/21 0059    Or   • potassium chloride 10 mEq in 100 mL IVPB  10 mEq Intravenous Q1H PRN Shante, Sharona, APRN       • sodium chloride 0.9 % flush 10 mL  10 mL Intravenous PRN Babar Heard MD       • sodium chloride 0.9 % flush 10 mL  10 mL Intravenous PRN Ross Olsen APRN         lactated ringers, 75 mL/hr, Last Rate: 75 mL/hr (10/18/21 2047)      •  acetaminophen **OR** acetaminophen **OR** acetaminophen  •  melatonin  •   potassium chloride **OR** potassium chloride **OR** potassium chloride  •  sodium chloride  •  [COMPLETED] Insert peripheral IV **AND** sodium chloride  Allergies   Allergen Reactions   • Codeine Unknown - High Severity   • Levothyroxine Unknown - Low Severity   • Penicillins Unknown - High Severity     ** tolerates ceftriaxone **     History reviewed. No pertinent family history.  Social History     Socioeconomic History   • Marital status:    Tobacco Use   • Smoking status: Never Smoker   • Smokeless tobacco: Never Used   Substance and Sexual Activity   • Alcohol use: Never   • Drug use: Never   • Sexual activity: Defer       Review of Systems - +/- per HPI and symptom review.    PPS: 30%  BP (!) 128/105 (BP Location: Left arm, Patient Position: Lying)   Pulse 83   Temp 97.6 °F (36.4 °C) (Axillary)   Resp 16   Wt 42.5 kg (93 lb 9.6 oz)   SpO2 96%   42.5 kg (93 lb 9.6 oz) There is no height or weight on file to calculate BMI.  Intake & Output (last day)       10/18 0701  10/19 0700 10/19 0701  10/20 0700    P.O. 195     Total Intake(mL/kg) 195 (4.6)     Net +195           Urine Unmeasured Occurrence 8 x 1 x    Stool Unmeasured Occurrence 2 x           Physical Exam:  General Appearance: No acute distress, weak/frail appearing  Head: Normocephalic without obvious abnormality, atraumatic  Eyes: Conjunctivae and sclerae normal, no icterus  Back: no skin lesions, erythema or scars, no tenderness midline  Lungs: Clear to auscultation, respirations regular, even and non-labored  Heart: Regular rhythm and normal rate, normal S1  Abdomen: soft, non-distended, non-tender  Extremities:  no redness, no cyanosis, no edema  Skin: Warm and dry  Neurological: Awake, responds to name with minimal replies to yes/no questions,no myoclonus    Reviewed labs and diagnostic results.  No results found for: HGBA1C  Results from last 7 days   Lab Units 10/16/21  0255   WBC 10*3/mm3 7.39   HEMOGLOBIN g/dL 15.2   HEMATOCRIT %  44.2   PLATELETS 10*3/mm3 296     Results from last 7 days   Lab Units 10/17/21  0936 10/16/21  0255 10/15/21  1543   SODIUM mmol/L 139   < > 141   POTASSIUM mmol/L 3.8   < > 3.2*   CHLORIDE mmol/L 104   < > 103   CO2 mmol/L 24.0   < > 29.0   BUN mg/dL 14   < > 22   CREATININE mg/dL 0.61   < > 0.93   CALCIUM mg/dL 9.3   < > 9.4   BILIRUBIN mg/dL  --   --  0.5   ALK PHOS U/L  --   --  258*   ALT (SGPT) U/L  --   --  21   AST (SGOT) U/L  --   --  30   GLUCOSE mg/dL 86   < > 135*    < > = values in this interval not displayed.     Results from last 7 days   Lab Units 10/17/21  0936   SODIUM mmol/L 139   POTASSIUM mmol/L 3.8   CHLORIDE mmol/L 104   CO2 mmol/L 24.0   BUN mg/dL 14   CREATININE mg/dL 0.61   GLUCOSE mg/dL 86   CALCIUM mg/dL 9.3     Imaging Results (Last 72 Hours)     Procedure Component Value Units Date/Time    CT Chest Without Contrast Diagnostic [[id number]] Collected: 10/15/21 1808     Updated: 10/16/21 2321    Narrative:      EXAMINATION: CT CHEST WO CONTRAST DIAGNOSTIC, CT ABDOMEN/ PELVIS WO  CONTRAST - 10/15/2021      INDICATION: Generalized weakness, worsening.     TECHNIQUE: 5 mm unenhanced images through the chest, abdomen and pelvis.     The radiation dose reduction device was turned on for each scan per the  ALARA (As Low as Reasonably Achievable) protocol.     COMPARISON: None     FINDINGS: History indicates worsening generalized weakness.     CT SCAN OF THE CHEST WITHOUT CONTRAST: No significant mediastinal hilar  or axillary adenopathy is appreciated. There is no evidence of  pericardial or pleural effusion. Lung window images show densely  calcified 13 mm right lower lobe nodule, and mild linear scarring in  both lung bases. No evidence of pneumonia is identified. Note is made of  previous lower thoracic kyphoplasties.       Impression:      No evidence of active chest disease.     ABDOMEN AND PELVIS CT SCAN WITHOUT CONTRAST: No significant  abnormalities are seen of the liver, spleen,  "pancreas, adrenal glands,  or kidneys for a non-IV contrast enhanced exam. Bowel loops are not  significantly distended, although there is slightly increased small  bowel gas. No bowel wall edema or pneumatosis is seen. Bladder is  distended, and appears slightly thick-walled fluid for the degree of  distention present, in particular, the anterior wall of the bladder  appears thickened, almost masslike as seen on sagittal image #68 series  900. Maximal oblique diameter of the bladder is 13 mm. Uterus and  ovaries are not identified, either absent or atrophic. No intrapelvic  free fluid or inflammatory change is seen. Bony structures appear to be  intact. Sclerotic changes of the left sacrum suggest an old healed  insufficiency fracture here.     IMPRESSION:   1. Slightly \"gassy\" appearance the abdomen but no evidence of  significant ileus or obstruction.     2. Distended bladder, with asymmetric thickening of the bladder dome, at  least potentially a sessile mass.     3. No evidence of acute inflammatory process or other clearly acute  intra-abdominal or intrapelvic disease.     4. Pattern of sclerosis of the left sacrum suggesting old healed sacral  insufficiency fracture.     DICTATED:   10/15/2021  EDITED/ls :   10/15/2021        This report was finalized on 10/16/2021 11:18 PM by Dr. Ozzie Velasquez MD.       CT Abdomen Pelvis Without Contrast [[id number]] Collected: 10/15/21 1808     Updated: 10/16/21 2321    Narrative:      EXAMINATION: CT CHEST WO CONTRAST DIAGNOSTIC, CT ABDOMEN/ PELVIS WO  CONTRAST - 10/15/2021      INDICATION: Generalized weakness, worsening.     TECHNIQUE: 5 mm unenhanced images through the chest, abdomen and pelvis.     The radiation dose reduction device was turned on for each scan per the  ALARA (As Low as Reasonably Achievable) protocol.     COMPARISON: None     FINDINGS: History indicates worsening generalized weakness.     CT SCAN OF THE CHEST WITHOUT CONTRAST: No significant mediastinal " "hilar  or axillary adenopathy is appreciated. There is no evidence of  pericardial or pleural effusion. Lung window images show densely  calcified 13 mm right lower lobe nodule, and mild linear scarring in  both lung bases. No evidence of pneumonia is identified. Note is made of  previous lower thoracic kyphoplasties.       Impression:      No evidence of active chest disease.     ABDOMEN AND PELVIS CT SCAN WITHOUT CONTRAST: No significant  abnormalities are seen of the liver, spleen, pancreas, adrenal glands,  or kidneys for a non-IV contrast enhanced exam. Bowel loops are not  significantly distended, although there is slightly increased small  bowel gas. No bowel wall edema or pneumatosis is seen. Bladder is  distended, and appears slightly thick-walled fluid for the degree of  distention present, in particular, the anterior wall of the bladder  appears thickened, almost masslike as seen on sagittal image #68 series  900. Maximal oblique diameter of the bladder is 13 mm. Uterus and  ovaries are not identified, either absent or atrophic. No intrapelvic  free fluid or inflammatory change is seen. Bony structures appear to be  intact. Sclerotic changes of the left sacrum suggest an old healed  insufficiency fracture here.     IMPRESSION:   1. Slightly \"gassy\" appearance the abdomen but no evidence of  significant ileus or obstruction.     2. Distended bladder, with asymmetric thickening of the bladder dome, at  least potentially a sessile mass.     3. No evidence of acute inflammatory process or other clearly acute  intra-abdominal or intrapelvic disease.     4. Pattern of sclerosis of the left sacrum suggesting old healed sacral  insufficiency fracture.     DICTATED:   10/15/2021  EDITED/ls :   10/15/2021        This report was finalized on 10/16/2021 11:18 PM by Dr. Ozzie Velasquez MD.       CT Head Without Contrast [[id number]] Collected: 10/15/21 1802     Updated: 10/16/21 2224    Narrative:      EXAMINATION: CT HEAD " WO CONTRAST - 10/15/2021     INDICATION: Dementia.     TECHNIQUE: 5 mm unenhanced images through the brain     The radiation dose reduction device was turned on for each scan per the  ALARA (As Low as Reasonably Achievable) protocol.     COMPARISON: None     FINDINGS: The calvarium appears intact. The included paranasal sinuses  and mastoids appear clear. Soft tissue window images show advanced  generalized cerebral atrophy, expected for age. There is no evidence of  hemorrhage, contusion, edema, mass or mass effect, hydrocephalus, or  abnormal extra-axial collection.       Impression:      Age appropriate generalized cerebral atrophy. No evidence of  acute intracranial disease is seen.     DICTATED:   10/15/2021  EDITED/ls :   10/15/2021     This report was finalized on 10/16/2021 10:21 PM by Dr. Ozzie Velasquez MD.           Impression: 89 y.o. female with dementia, UTI  Plan:   AMS - continue to monitor, patient is minimally engaging    Goals of care discussion - spoke with step daughter, Isadora, over the phone.  She shared that their sibling, patient's son, Willie Moss, has deferred to other sibling, Steph Espino, as point person for healthcare decisions. Steph is planning to visit later today.   Started discussion about choices with plans of care, eligibility with hospice and hospice plan of care.   Discussed current clinical status and expected outcomes if patient persists with diminished po intake.     Palliative team provide support to pt/family.       Nayeli Vega, [place]  [phone]  10/19/21  10:20 EDT      Time: 60 minutes spent reviewing medical and medication records, assessing and examining patient, discussing with patient, family and nursing staff, answering questions, formulating a plan and documentation of care. > 50% time spent face to face